# Patient Record
Sex: FEMALE | Race: WHITE | NOT HISPANIC OR LATINO | Employment: OTHER | URBAN - METROPOLITAN AREA
[De-identification: names, ages, dates, MRNs, and addresses within clinical notes are randomized per-mention and may not be internally consistent; named-entity substitution may affect disease eponyms.]

---

## 2018-12-29 LAB
INFLUENZA A ANTIGEN, POC: NEGATIVE
INFLUENZA B ANTIGEN, POC: NEGATIVE
RAPID GROUP A STREP (OHS): NEGATIVE

## 2020-01-24 LAB
INFLUENZA A ANTIGEN, POC: NEGATIVE
INFLUENZA B ANTIGEN, POC: NEGATIVE

## 2020-10-15 PROBLEM — Z95.0 PACEMAKER: Status: ACTIVE | Noted: 2020-10-15

## 2020-12-02 ENCOUNTER — CLINICAL SUPPORT (OUTPATIENT)
Dept: REHABILITATION | Facility: HOSPITAL | Age: 85
End: 2020-12-02
Payer: MEDICARE

## 2020-12-02 DIAGNOSIS — R26.9 GAIT ABNORMALITY: ICD-10-CM

## 2020-12-02 PROCEDURE — 97161 PT EVAL LOW COMPLEX 20 MIN: CPT | Mod: PN

## 2020-12-02 NOTE — PLAN OF CARE
[facility]  Physical Therapy Initial Evaluation    Name: Vee Cadena  Clinic Number: [id number]    Therapy Diagnosis:   Encounter Diagnosis   Name Primary?    Gait abnormality      Physician: Jaren Narvaez II, MD    Physician Orders: PT Eval and Treat   Medical Diagnosis from Referral:   M54.5 (ICD-10-CM) - Acute midline low back pain without sciatica   S22.000A (ICD-10-CM) - Compression fracture of body of thoracic vertebra     Evaluation Date: 12/2/2020  Authorization Period Expiration: 11/25/2021  Plan of Care Expiration: 01/22/2021  Visit # / Visits authorized: 1/ 1     Time In: 1008  Time Out: 1050  Total Billable Time: 42 minutes    Precautions: falls, pacemaker (no estim), osteoporosis, HTN    Subjective   Date of onset: 2-3 weeks ago  History of current condition - Vee reports: onset of low/mid back pain a few weeks ago after twisting to catch a drawer from falling. She reports a history of back pain, however the pain she is now experiencing is a different type and more intense than previous episodes. She has a history of falls.     Medical History:   Past Medical History:   Diagnosis Date    Back ache     Back pain     Carotid bruit     Cervical disc disease     Chest pain     Coronary artery disease     Diffuse cystic mastopathy     Elevated blood pressure     Encounter for blood transfusion     Foot pain, left     Generalized osteoarthrosis     Herpes zoster without complications     Hypertension     Neck pain     Neuropathy     Osteoporosis     Palpitations     PONV (postoperative nausea and vomiting)     TIA (transient ischemic attack)        Surgical History:   Vee Cadena  has a past surgical history that includes Cardiac catheterization; Total abdominal hysterectomy; Tubal ligation; Cholecystectomy; bladder and colon lift and retached; Appendectomy; Cataract extraction (02/23/2009); and Insertion of pacemaker (12/2019).    Past Surgical  History:   Procedure Laterality Date    APPENDECTOMY      bladder and colon lift and retached      CARDIAC CATHETERIZATION      CATARACT EXTRACTION  02/23/2009    CHOLECYSTECTOMY      INSERTION OF PACEMAKER  12/2019    pt reported    TOTAL ABDOMINAL HYSTERECTOMY      TUBAL LIGATION         Medications:   Vee has a current medication list which includes the following prescription(s): amlodipine, ascorbic acid (vitamin c), cholecalciferol (vitamin d3), cyanocobalamin, losartan, tramadol, UNABLE TO FIND, and vitamin e.    Allergies:   Review of patient's allergies indicates:   Allergen Reactions    Demerol [meperidine]      restless        Thoracolumbar Xray 11/24/2020:  Impression:     Multiple compression fractures in the mid and lower thoracic spine.  The acuity of these is uncertain.    Prior Therapy: remote outpatient PT for [facility]  Social History/Occupation: recently  and just moved from [place]; lives alone in ground level apartment; drives; hobbies include reading  Prior Level of Function: Independent  Current Level of Function: Decreased functional mobility     Pain:    Location: low and mid back   Description: shooting, spasming  Aggravating Factors: bending/leaning, prolonged standing (such as preparing a meal)  Easing Factors: sitting down, heat  Current  5/10, worst 9-10/10, best 0/10 (when first waking up and prior to moving)    Pts goals: Improve pain/function    Objective     Posture/Appearance: Fwd head position, rounded shoulders; fwd trunk lean  Palpation: no points of tenderness notable    ROM/Strength:     Trunk flexion: proximal tibia (moving very slowly and reporting increased pain)  Extension 10* with relief of pain  Sidebending R 75%, L 50%    Lower Extremity Range of Motion:  Right Lower Extremity: not fully tested due to pt intolerance to lying down on mat  Left Lower Extremity: not fully tested due to pt intolerance to lying down on mat    Lower Extremity  Strength:  Right Lower Extremity: 4/5 to 4+/5  Left Lower Extremity: 4/5 to 4+/5    Flexibility: ROM as per above  Transfers: sit to stand with bilateral UE support, very slow and guarded  Gait: antalgic with no AD    Functional Outcome Measure: The Revised Oswestry Low Back Pain Questionnaire: 27/50=54% impairment      TREATMENT       Home Exercises and Patient Education Provided    Education provided:   - Role/goal PT; POC  - Discussed monitoring symptoms and stopping activities which cause increased pain    Written Home Exercises Provided: No, will issue at 1st or 2nd treatment after ascertaining pt tolerance    Assessment   Vee is a 87 y.o. female referred to outpatient Physical Therapy with a medical diagnosis of   M54.5 (ICD-10-CM) - Acute midline low back pain without sciatica   S22.000A (ICD-10-CM) - Compression fracture of body of thoracic vertebra     . Pt presents with decreased ROM, impaired posture, gait abnormality, and impaired functional mobility.    Pt prognosis is Good.   Pt will benefit from skilled outpatient Physical Therapy to address the deficits stated above and in the chart below, provide pt/family education, and to maximize pt's level of independence.     Plan of care discussed with patient: Yes  Pt's spiritual, cultural and educational needs considered and patient is agreeable to the plan of care and goals as stated below:     Anticipated Barriers for therapy: pain    Medical Necessity is demonstrated by the following  History  Co-morbidities and personal factors that may impact the plan of care Co-morbidities:   cardiac/pacemaker, HTN, osteoporosis  Personal Factors:   age     moderate   Examination  Body Structures and Functions, activity limitations and participation restrictions that may impact the plan of care Body Regions:   back  Body Systems:    ROM  strength  gait  transfers  Participation Restrictions:   Activity limitations:   Learning and applying knowledge  no  deficits  General Tasks and Commands  no deficits  Communication  no deficits  Mobility  lifting and carrying objects  walking  Self care  washing oneself (bathing, drying, washing hands)  dressing  Domestic Life  cooking  doing house work (cleaning house, washing dishes, laundry)  Interactions/Relationships  no deficits  Life Areas  no deficits  Community and Social Life  recreation and leisure       moderate   Clinical Presentation stable and uncomplicated low   Decision Making/ Complexity Score: low     Goals:  Short Term Goals: 3 weeks   1) Patient will initiate HEP  2) Patient will be able to perform sit to  <5 sec   3) Patient will report pain <5/10    Long Term Goals: 6 weeks   1) Patient will be independent in HEP  2) Patient will return to I/ADL's with pain <3/10  3) Patient will improve functional outcome measure The Revised Oswestry Low Back Pain Questionnaire to <30% impairment    Plan   Plan of care Certification: 12/2/2020 to 01/22/2021.    Outpatient Physical Therapy 2 times weekly for 6 weeks to include the following interventions: Gait Training, Manual Therapy, Moist Heat/ Ice, Patient Education, Therapeutic Activites and Therapeutic Exercise.     Laurie Patel, PT

## 2020-12-04 ENCOUNTER — CLINICAL SUPPORT (OUTPATIENT)
Dept: REHABILITATION | Facility: HOSPITAL | Age: 85
End: 2020-12-04
Payer: MEDICARE

## 2020-12-04 DIAGNOSIS — R26.9 GAIT ABNORMALITY: ICD-10-CM

## 2020-12-04 PROCEDURE — 97110 THERAPEUTIC EXERCISES: CPT | Mod: PN

## 2020-12-04 PROCEDURE — 97010 HOT OR COLD PACKS THERAPY: CPT | Mod: PN

## 2020-12-04 NOTE — PROGRESS NOTES
Physical Therapy Daily Treatment Note     Time In: 1005  Time Out: 1050  Total Billable Time: 45 minutes    Name: Vee Cadena  Clinic Number: [id number]    Therapy Diagnosis:   Encounter Diagnosis   Name Primary?    Gait abnormality      Physician: Jaren Narvaez II, MD    Visit Date: 12/4/2020    Physician Orders: PT Eval and Treat   Medical Diagnosis from Referral:   M54.5 (ICD-10-CM) - Acute midline low back pain without sciatica   S22.000A (ICD-10-CM) - Compression fracture of body of thoracic vertebra      Evaluation Date: 12/2/2020  Authorization Period Expiration: 12/31/2020  Plan of Care Expiration: 01/22/2021  Visit # / Visits authorized: 1/ 19          Precautions: falls, pacemaker (no estim), osteoporosis, HTN    Subjective     Pt reports: no soreness following her initial evaluation  No HEP issued at initial evaluation  Response to previous treatment: N/A  Functional change: no    Pain: 9/10  Location: lower thoracic/lumbar region    Objective     Vee received therapeutic exercises to develop strength, endurance, ROM, core strength, and flexibility for 35 minutes including:    Nustep L1 x 5 min UE's/LE's  Seated hamstring stretch c/ stool 3/30 sec L/R  Seated TrA sets c/ physioball x 20 c/ 5 sec holds  Seated thoracic extensions c/ roll x 15  Trunk flexion c/ physioball fwd x 5    MHP applied to thoracic/lumbar back x 10 min for muscle relaxation purposes        Education provided:     - Discussed the weight of her purse and recommended lightening it if possible to improve posture and balance. She    verbalized understanding/agreement  - Initiate home exercise program as instructed  - Discussed monitoring symptoms and stopping activities which cause increased pain  - Confirmed date/time of next appointment    Written Home Exercises Provided: yes.  Exercises were reviewed and Vee was able to demonstrate them prior to the end of the session.  Vee demonstrated good  understanding of the  education provided.     See EMR under Media for exercises provided 12/4/2020.    Assessment     Performed seated exercises only today due to patient intolerance for getting on mat at initial evaluation. Patient completing therex slowly with cues for correct execution.  Pt reporting pain ~4/10 following therex and relief following MHP. Ambulating with mild unsteadiness without AD.  Vee is progressing well towards her goals.   Pt prognosis is Good.     Pt will continue to benefit from skilled outpatient physical therapy to address the deficits listed in the problem list box on initial evaluation, provide pt/family education and to maximize pt's level of independence in the home and community environment.     Pt's spiritual, cultural and educational needs considered and pt agreeable to plan of care and goals.     Anticipated barriers to physical therapy: pain    Goals:  Short Term Goals: 3 weeks   1) Patient will initiate HEP  2) Patient will be able to perform sit to  <5 sec   3) Patient will report pain <5/10     Long Term Goals: 6 weeks   1) Patient will be independent in HEP  2) Patient will return to I/ADL's with pain <3/10  3) Patient will improve functional outcome measure The Revised Oswestry Low Back Pain Questionnaire to <30% impairment    Short Term Goal Status:  1) Met  2) Not met  3) Not met    Long Term Goal Status:  1) Ongoing  2) Not met  3) Not assessed        Plan     Continue with established Plan of Care towards PT goals. Progression of therex program to include supine activities per pt tolerance.

## 2020-12-08 ENCOUNTER — CLINICAL SUPPORT (OUTPATIENT)
Dept: REHABILITATION | Facility: HOSPITAL | Age: 85
End: 2020-12-08
Payer: MEDICARE

## 2020-12-08 DIAGNOSIS — R26.9 GAIT ABNORMALITY: ICD-10-CM

## 2020-12-08 PROCEDURE — 97110 THERAPEUTIC EXERCISES: CPT | Mod: PN,CQ

## 2020-12-08 PROCEDURE — 97010 HOT OR COLD PACKS THERAPY: CPT | Mod: PN,CQ

## 2020-12-08 NOTE — PROGRESS NOTES
"  Physical Therapy Daily Treatment Note     Name: Vee Cadena  Clinic Number: [id number]    Therapy Diagnosis:   Encounter Diagnosis   Name Primary?    Gait abnormality      Physician: Jaren Narvaez II, MD    Visit Date: 12/8/2020    Physician Orders: PT Eval and Treat   Medical Diagnosis from Referral:   M54.5 (ICD-10-CM) - Acute midline low back pain without sciatica   S22.000A (ICD-10-CM) - Compression fracture of body of thoracic vertebra      Evaluation Date: 12/2/2020  Authorization Period Expiration: 12/31/2020  Plan of Care Expiration: 01/22/2021  Visit # / Visits authorized: 3/19        Time In: 0946  Time Out: 1040  Total Billable Time: 44 minutes    Precautions: falls, pacemaker (no estim), osteoporosis, HTN    Subjective     Pt reports: "I was yimi sore the next day"  Patient was complaint with performing home exercise program   Response to previous treatment: N/A  Functional change: no    Pain: 8/10  Location: lower thoracic/lumbar region    Objective     Vee received therapeutic exercises to develop strength, endurance, ROM, core strength, and flexibility for 34 minutes including:   Nustep L1 x 10 min UE's/LE's   Seated hamstring stretch c/ stool (and towel behind back to assist 3/30 sec L/R   Seated TrA sets c/ physioball x 20 c/ 5 sec holds   Seated thoracic extensions c/ roll x 15   Trunk flexion c/ physioball fwd x 5   Sit<>supine t/f via log roll with CGA and VC's   Supine lying x 3'    MHP applied to thoracic/lumbar back x 10 min for muscle relaxation purposes    Education provided:     - Discussed the weight of her purse and recommended lightening it if possible to improve posture and balance. She    verbalized understanding/agreement  - Initiate home exercise program as instructed  - Discussed monitoring symptoms and stopping activities which cause increased pain  - Confirmed date/time of next appointment    Written Home Exercises Provided: yes. Patient given copy of sleeping positions " as well as log roll technique  Exercises were reviewed and Vee was able to demonstrate them prior to the end of the session.  Vee demonstrated good  understanding of the education provided.     See EMR under Media for exercises provided 12/4/2020.    Assessment   Vee tolerated therex with no c/o increase in s/s. However, with sit<>supine patient slow and guarded and upon returning to sitting position patient c/o shooting pains in mid back.    Vee is progressing well towards her goals.   Pt prognosis is Good.     Pt will continue to benefit from skilled outpatient physical therapy to address the deficits listed in the problem list box on initial evaluation, provide pt/family education and to maximize pt's level of independence in the home and community environment.     Pt's spiritual, cultural and educational needs considered and pt agreeable to plan of care and goals.     Anticipated barriers to physical therapy: pain    Goals:  Short Term Goals: 3 weeks   1) Patient will initiate HEP (progressing)  2) Patient will be able to perform sit to  <5 sec  (progressing)  3) Patient will report pain <5/10 (progressing)     Long Term Goals: 6 weeks   1) Patient will be independent in HEP (progressing)  2) Patient will return to I/ADL's with pain <3/10 (progressing)  3) Patient will improve functional outcome measure The Revised Oswestry Low Back Pain Questionnaire to <30% impairment (not assessed this date)      Plan     Continue with established Plan of Care towards PT goals. Progression of therex program to include supine activities per pt tolerance.

## 2020-12-10 ENCOUNTER — TELEPHONE (OUTPATIENT)
Dept: REHABILITATION | Facility: HOSPITAL | Age: 85
End: 2020-12-10

## 2020-12-11 ENCOUNTER — CLINICAL SUPPORT (OUTPATIENT)
Dept: REHABILITATION | Facility: HOSPITAL | Age: 85
End: 2020-12-11
Attending: INTERNAL MEDICINE
Payer: MEDICARE

## 2020-12-11 DIAGNOSIS — R26.9 GAIT ABNORMALITY: ICD-10-CM

## 2020-12-11 PROCEDURE — 97110 THERAPEUTIC EXERCISES: CPT | Mod: PN,CQ

## 2020-12-11 NOTE — PROGRESS NOTES
"  Physical Therapy Daily Treatment Note     Name: Vee Cadena  Clinic Number: [id number]    Therapy Diagnosis:   Encounter Diagnosis   Name Primary?    Gait abnormality      Physician: Jaren Narvaez II, MD    Visit Date: 12/11/2020    Physician Orders: PT Eval and Treat   Medical Diagnosis from Referral:   M54.5 (ICD-10-CM) - Acute midline low back pain without sciatica   S22.000A (ICD-10-CM) - Compression fracture of body of thoracic vertebra      Evaluation Date: 12/2/2020  Authorization Period Expiration: 12/31/2020  Plan of Care Certification Period: 12/2/2020 to 01/22/2021  Visit # / Visits authorized: 3/19        Time In:  1230  Time Out: 1315  Total Billable Time: 45 minutes    Precautions: falls, pacemaker (no estim), osteoporosis, HTN    Subjective     Pt reports: "Actually, I feel very good today."  Pt states her pain level is decreased today  Patient was complaint with performing home exercise program   Response to previous treatment: N/A  Functional change: no    Pain: 2/10  Location: lower thoracic/lumbar region    Objective     Vee received therapeutic exercises to develop strength, endurance, ROM, core strength, and flexibility for 45 minutes including:    Nustep L2 x 10 min UE's/LE's  Seated hamstring stretch 3 x 20 sec B LE  Seated ball squeezes x 20 reps  Seated TrA sets c/ physioball x 20 c/ 5 sec holds  Seated thoracic extensions c/ roll x 15  Seated trunk flexion c/ physioball fwd x 5    Wedge used for supine exercises  Supine LTR x 10 reps   Supine bridging x 10 reps   PPT x 10 reps   Glut sets x 10 reps    Education provided:   -apply heat at home to decrease muscle soreness and relaxation    Written Home Exercises Provided: yes.   Exercises were reviewed and Vee was able to demonstrate them prior to the end of the session.  Vee demonstrated good  understanding of the education provided.     See EMR under Media for exercises provided 12/4/2020.    Assessment     Vee tolerated " therex with no c/o increase in s/s.  Increased time needed for bed mobility and transfers, especially sit <> supine.    Vee is progressing well towards her goals.   Pt prognosis is Good.     Pt will continue to benefit from skilled outpatient physical therapy to address the deficits listed in the problem list box on initial evaluation, provide pt/family education and to maximize pt's level of independence in the home and community environment.     Pt's spiritual, cultural and educational needs considered and pt agreeable to plan of care and goals.     Anticipated barriers to physical therapy: pain    Goals:  Short Term Goals: 3 weeks   1) Patient will initiate HEP (progressing)  2) Patient will be able to perform sit to  <5 sec  (progressing)  3) Patient will report pain <5/10 (progressing)     Long Term Goals: 6 weeks   1) Patient will be independent in HEP (progressing)  2) Patient will return to I/ADL's with pain <3/10 (progressing)  3) Patient will improve functional outcome measure The Revised Oswestry Low Back Pain Questionnaire to <30% impairment (not assessed this date)      Plan     Continue with established Plan of Care towards PT goals. Progression of therex program to include supine activities per pt tolerance.

## 2020-12-15 ENCOUNTER — CLINICAL SUPPORT (OUTPATIENT)
Dept: REHABILITATION | Facility: HOSPITAL | Age: 85
End: 2020-12-15
Payer: MEDICARE

## 2020-12-15 DIAGNOSIS — R26.9 GAIT ABNORMALITY: ICD-10-CM

## 2020-12-15 PROCEDURE — 97110 THERAPEUTIC EXERCISES: CPT | Mod: PN

## 2020-12-15 NOTE — PROGRESS NOTES
Physical Therapy Daily Treatment Note     Time In: 1025  Time Out: 1109  Total Billable Time: 44 minutes    Name: Vee Cadena  Clinic Number: [id number]    Therapy Diagnosis:   Encounter Diagnosis   Name Primary?    Gait abnormality      Physician: Jaren Narvaez II, MD    Visit Date: 12/15/2020    Physician Orders: PT Eval and Treat   Medical Diagnosis from Referral:   M54.5 (ICD-10-CM) - Acute midline low back pain without sciatica   S22.000A (ICD-10-CM) - Compression fracture of body of thoracic vertebra      Evaluation Date: 12/2/2020  Authorization Period Expiration: 12/31/2020  Plan of Care Certification Period: 12/2/2020 to 01/22/2021  Visit # / Visits authorized: 4/19       Precautions: falls, pacemaker (no estim), osteoporosis, HTN    Subjective     Pt reports: improved overall pain level today. However, she reports severe pain when doing glute sets in therapy, which does not resolve for some time.  She was compliant with home exercise program.  Response to previous treatment: no complaints  Functional change: no    Pain: 2/10  Location: back    Objective     Vee received therapeutic exercises to develop strength, endurance, ROM, flexibility and core stabilization for 44 minutes including:    Nustep L2 x 10 min UE's/LE's  Seated hamstring stretch 3/30 sec L/R  Seated thoracic extensions with foam x 20  Seated TrA sets with physioball x 20 c/ 5 sec holds  Seated ball squeezes x 20 c/ 5 sec holds  Seated fwd flexion with large physioball x 5    Supine mat activities (using wedge for comfort):  PPT x 10  Bridging x 10  LTR's x 10      Education provided:     - Continue with current home exercise program as instructed  - Discussed monitoring symptoms and stopping activities which cause increased pain      Written Home Exercises Provided: yes.  Exercises were reviewed and Vee was able to demonstrate them prior to the end of the session.  Vee demonstrated good  understanding of the education provided.      See EMR under Media for exercises provided 12/4/2020 and 12/15/2020.    Assessment     Good tolerance for activities with cues for proper execution. Educated patient to discontinue glute sets at this time.  Vee is progressing well towards her goals.   Pt prognosis is Good.     Pt will continue to benefit from skilled outpatient physical therapy to address the deficits listed in the problem list box on initial evaluation, provide pt/family education and to maximize pt's level of independence in the home and community environment.     Pt's spiritual, cultural and educational needs considered and pt agreeable to plan of care and goals.     Anticipated barriers to physical therapy: pain    Goals:  Short Term Goals: 3 weeks   1) Patient will initiate HEP  2) Patient will be able to perform sit to  <5 sec   3) Patient will report pain <5/10     Long Term Goals: 6 weeks   1) Patient will be independent in HEP  2) Patient will return to I/ADL's with pain <3/10  3) Patient will improve functional outcome measure The Revised Oswestry Low Back Pain Questionnaire to <30% impairment     Short Term Goal Status:  1) Met  2) Not met  3) Not met     Long Term Goal Status:  1) Ongoing  2) Not met  3) Not assessed           Plan      Continue with established Plan of Care towards PT goals. Progression of therex program to include supine activities per pt tolerance

## 2020-12-21 ENCOUNTER — PATIENT MESSAGE (OUTPATIENT)
Dept: OTHER | Facility: OTHER | Age: 85
End: 2020-12-21

## 2020-12-28 ENCOUNTER — CLINICAL SUPPORT (OUTPATIENT)
Dept: REHABILITATION | Facility: HOSPITAL | Age: 85
End: 2020-12-28
Payer: MEDICARE

## 2020-12-28 DIAGNOSIS — R26.9 GAIT ABNORMALITY: ICD-10-CM

## 2020-12-28 PROCEDURE — 97110 THERAPEUTIC EXERCISES: CPT | Mod: PN

## 2020-12-28 NOTE — PROGRESS NOTES
Physical Therapy Daily Treatment Note     Time In: 1003  Time Out: 1042  Total Billable Time: 39 minutes    Name: Vee Cadena  Clinic Number: [id number]    Therapy Diagnosis:   Encounter Diagnosis   Name Primary?    Gait abnormality      Physician: Jaren Narvaez II, MD    Visit Date: 12/28/2020    Physician Orders: PT Eval and Treat   Medical Diagnosis from Referral:   M54.5 (ICD-10-CM) - Acute midline low back pain without sciatica   S22.000A (ICD-10-CM) - Compression fracture of body of thoracic vertebra      Evaluation Date: 12/2/2020  Authorization Period Expiration: 12/31/2020  Plan of Care Certification Period: 12/2/2020 to 01/22/2021  Visit # / Visits authorized: 5/19         Precautions: falls, pacemaker (no estim), osteoporosis, HTN      Subjective     Pt reports: she hurts after doing her home exercises. Overall, she is still feeling better now than at [facility], no longer having her most intense, sharp pain when getting up to standing, getting in/out of bed. Reports following PT sessions, she is in increased pain and goes home to lie down and put up her feet. Pain resolves after this.  She was compliant with home exercise program.  Response to previous treatment: increased pain   Functional change: no    Pain: 4/10  Location: back    Objective     Vee received therapeutic exercises to develop strength, endurance, ROM, flexibility and core stabilization for 39 minutes including:    Nustep L2 x 10 min UE's/LE's  Seated hamstring stretch c/ stool 3/30 sec L/R  Seated thoracic extensions with foam x 15  Seated TrA sets with physioball x 15 c/ 5 sec holds     Supine mat activities (using wedge for comfort):  PPT x 10  Bridging x 10  LTR's x 10  Hip adduction ball squeezes x 15    Education provided:     - Continue with current home exercise program as instructed  - Discussed monitoring symptoms and stopping activities which cause increased pain  - Confirmed date/time of next appointment    Written Home  Exercises Provided: yes.  Exercises were reviewed and Vee was able to demonstrate them prior to the end of the session.  Vee demonstrated good  understanding of the education provided.      See EMR under Media for exercises provided 12/4/2020 and 12/15/2020    Assessment       Vee is progressing well towards her goals.   Pt prognosis is Fair.     Pt will continue to benefit from skilled outpatient physical therapy to address the deficits listed in the problem list box on initial evaluation, provide pt/family education and to maximize pt's level of independence in the home and community environment.     Pt's spiritual, cultural and educational needs considered and pt agreeable to plan of care and goals.     Anticipated barriers to physical therapy: pain    Goals:  Short Term Goals: 3 weeks   1) Patient will initiate HEP  2) Patient will be able to perform sit to  <5 sec   3) Patient will report pain <5/10     Long Term Goals: 6 weeks   1) Patient will be independent in HEP  2) Patient will return to I/ADL's with pain <3/10  3) Patient will improve functional outcome measure The Revised Oswestry Low Back Pain Questionnaire to <30% impairment     Short Term Goal Status:  1) Met  2) Not met  3) Not met     Long Term Goal Status:  1) Ongoing  2) Not met  3) Not assessed           Plan      Continue with established Plan of Care towards PT goals. Progression of therex program to include supine activities per pt tolerance    Laurie Patel, PT

## 2021-01-05 ENCOUNTER — CLINICAL SUPPORT (OUTPATIENT)
Dept: REHABILITATION | Facility: HOSPITAL | Age: 86
End: 2021-01-05
Payer: MEDICARE

## 2021-01-05 DIAGNOSIS — R26.9 GAIT ABNORMALITY: Primary | ICD-10-CM

## 2021-01-05 PROCEDURE — 97110 THERAPEUTIC EXERCISES: CPT | Mod: PN,CQ

## 2021-01-08 ENCOUNTER — CLINICAL SUPPORT (OUTPATIENT)
Dept: REHABILITATION | Facility: HOSPITAL | Age: 86
End: 2021-01-08
Payer: MEDICARE

## 2021-01-08 DIAGNOSIS — R26.9 GAIT ABNORMALITY: ICD-10-CM

## 2021-02-08 ENCOUNTER — CLINICAL SUPPORT (OUTPATIENT)
Dept: REHABILITATION | Facility: HOSPITAL | Age: 86
End: 2021-02-08
Payer: MEDICARE

## 2021-02-08 DIAGNOSIS — R26.9 GAIT ABNORMALITY: ICD-10-CM

## 2021-02-08 PROCEDURE — 97161 PT EVAL LOW COMPLEX 20 MIN: CPT | Mod: PN

## 2021-03-01 ENCOUNTER — CLINICAL SUPPORT (OUTPATIENT)
Dept: REHABILITATION | Facility: HOSPITAL | Age: 86
End: 2021-03-01
Payer: MEDICARE

## 2021-03-01 DIAGNOSIS — R26.9 GAIT ABNORMALITY: ICD-10-CM

## 2021-03-01 PROCEDURE — 97110 THERAPEUTIC EXERCISES: CPT | Mod: PN,CQ

## 2021-03-01 PROCEDURE — 97112 NEUROMUSCULAR REEDUCATION: CPT | Mod: PN,CQ

## 2021-03-05 ENCOUNTER — CLINICAL SUPPORT (OUTPATIENT)
Dept: REHABILITATION | Facility: HOSPITAL | Age: 86
End: 2021-03-05
Payer: MEDICARE

## 2021-03-05 DIAGNOSIS — R26.9 GAIT ABNORMALITY: ICD-10-CM

## 2021-03-05 PROCEDURE — 97110 THERAPEUTIC EXERCISES: CPT | Mod: PN,CQ

## 2021-03-05 PROCEDURE — 97112 NEUROMUSCULAR REEDUCATION: CPT | Mod: PN,CQ

## 2021-03-08 ENCOUNTER — CLINICAL SUPPORT (OUTPATIENT)
Dept: REHABILITATION | Facility: HOSPITAL | Age: 86
End: 2021-03-08
Payer: MEDICARE

## 2021-03-08 DIAGNOSIS — R26.9 GAIT ABNORMALITY: ICD-10-CM

## 2021-03-08 PROCEDURE — 97112 NEUROMUSCULAR REEDUCATION: CPT | Mod: PN,CQ

## 2021-03-08 PROCEDURE — 97110 THERAPEUTIC EXERCISES: CPT | Mod: PN,CQ

## 2021-03-27 ENCOUNTER — IMMUNIZATION (OUTPATIENT)
Dept: PRIMARY CARE CLINIC | Facility: CLINIC | Age: 86
End: 2021-03-27

## 2021-03-27 DIAGNOSIS — Z23 NEED FOR VACCINATION: Primary | ICD-10-CM

## 2021-03-27 PROCEDURE — 0001A COVID-19, MRNA, LNP-S, PF, 30 MCG/0.3 ML DOSE VACCINE: CPT | Mod: CV19,S$GLB,, | Performed by: FAMILY MEDICINE

## 2021-03-27 PROCEDURE — 91300 COVID-19, MRNA, LNP-S, PF, 30 MCG/0.3 ML DOSE VACCINE: ICD-10-PCS | Mod: S$GLB,,, | Performed by: FAMILY MEDICINE

## 2021-03-27 PROCEDURE — 0001A COVID-19, MRNA, LNP-S, PF, 30 MCG/0.3 ML DOSE VACCINE: ICD-10-PCS | Mod: CV19,S$GLB,, | Performed by: FAMILY MEDICINE

## 2021-03-27 PROCEDURE — 91300 COVID-19, MRNA, LNP-S, PF, 30 MCG/0.3 ML DOSE VACCINE: CPT | Mod: S$GLB,,, | Performed by: FAMILY MEDICINE

## 2021-04-17 ENCOUNTER — IMMUNIZATION (OUTPATIENT)
Dept: PRIMARY CARE CLINIC | Facility: CLINIC | Age: 86
End: 2021-04-17
Payer: MEDICARE

## 2021-04-17 DIAGNOSIS — Z23 NEED FOR VACCINATION: Primary | ICD-10-CM

## 2021-04-17 PROCEDURE — 91300 COVID-19, MRNA, LNP-S, PF, 30 MCG/0.3 ML DOSE VACCINE: ICD-10-PCS | Mod: S$GLB,,, | Performed by: FAMILY MEDICINE

## 2021-04-17 PROCEDURE — 91300 COVID-19, MRNA, LNP-S, PF, 30 MCG/0.3 ML DOSE VACCINE: CPT | Mod: S$GLB,,, | Performed by: FAMILY MEDICINE

## 2021-04-17 PROCEDURE — 0002A COVID-19, MRNA, LNP-S, PF, 30 MCG/0.3 ML DOSE VACCINE: CPT | Mod: CV19,S$GLB,, | Performed by: FAMILY MEDICINE

## 2021-04-17 PROCEDURE — 0002A COVID-19, MRNA, LNP-S, PF, 30 MCG/0.3 ML DOSE VACCINE: ICD-10-PCS | Mod: CV19,S$GLB,, | Performed by: FAMILY MEDICINE

## 2021-08-17 ENCOUNTER — OFFICE VISIT (OUTPATIENT)
Dept: SPINE | Facility: CLINIC | Age: 86
End: 2021-08-17
Payer: MEDICARE

## 2021-08-17 VITALS — WEIGHT: 142 LBS | BODY MASS INDEX: 24.24 KG/M2 | HEIGHT: 64 IN

## 2021-08-17 DIAGNOSIS — M40.204 KYPHOSIS OF THORACIC REGION, UNSPECIFIED KYPHOSIS TYPE: Primary | ICD-10-CM

## 2021-08-17 DIAGNOSIS — G89.29 CHRONIC MIDLINE LOW BACK PAIN WITHOUT SCIATICA: ICD-10-CM

## 2021-08-17 DIAGNOSIS — M54.50 CHRONIC MIDLINE LOW BACK PAIN WITHOUT SCIATICA: ICD-10-CM

## 2021-08-17 PROCEDURE — 99204 PR OFFICE/OUTPT VISIT, NEW, LEVL IV, 45-59 MIN: ICD-10-PCS | Mod: S$GLB,,, | Performed by: PHYSICAL MEDICINE & REHABILITATION

## 2021-08-17 PROCEDURE — 99204 OFFICE O/P NEW MOD 45 MIN: CPT | Mod: S$GLB,,, | Performed by: PHYSICAL MEDICINE & REHABILITATION

## 2021-09-05 ENCOUNTER — HOSPITAL ENCOUNTER (INPATIENT)
Facility: HOSPITAL | Age: 86
LOS: 2 days | Discharge: HOME | DRG: 378 | End: 2021-09-07
Attending: EMERGENCY MEDICINE | Admitting: INTERNAL MEDICINE
Payer: MEDICARE

## 2021-09-05 DIAGNOSIS — K92.2 GI BLEED: Primary | ICD-10-CM

## 2021-09-05 DIAGNOSIS — R94.31 ABNORMAL EKG: ICD-10-CM

## 2021-09-05 PROBLEM — N18.30 ACUTE RENAL FAILURE SUPERIMPOSED ON STAGE 3 CHRONIC KIDNEY DISEASE: Status: ACTIVE | Noted: 2021-09-05

## 2021-09-05 PROBLEM — N17.9 ACUTE RENAL FAILURE SUPERIMPOSED ON STAGE 3 CHRONIC KIDNEY DISEASE: Status: ACTIVE | Noted: 2021-09-05

## 2021-09-05 LAB
ABO + RH BLD: NORMAL
ALBUMIN SERPL BCP-MCNC: 3.7 G/DL (ref 3.5–5.2)
ALP SERPL-CCNC: 51 U/L (ref 55–135)
ALT SERPL W/O P-5'-P-CCNC: 13 U/L (ref 10–44)
ANION GAP SERPL CALC-SCNC: 11 MMOL/L (ref 8–16)
AST SERPL-CCNC: 15 U/L (ref 10–40)
BASOPHILS # BLD AUTO: 0.03 K/UL (ref 0–0.2)
BASOPHILS NFR BLD: 0.5 % (ref 0–1.9)
BILIRUB SERPL-MCNC: 0.7 MG/DL (ref 0.1–1)
BLD GP AB SCN CELLS X3 SERPL QL: NORMAL
BLD PROD TYP BPU: NORMAL
BLOOD UNIT EXPIRATION DATE: NORMAL
BLOOD UNIT TYPE CODE: 600
BLOOD UNIT TYPE: NORMAL
BUN SERPL-MCNC: 31 MG/DL (ref 8–23)
C DIFF GDH STL QL: NEGATIVE
C DIFF TOX A+B STL QL IA: NEGATIVE
CALCIUM SERPL-MCNC: 8.6 MG/DL (ref 8.7–10.5)
CHLORIDE SERPL-SCNC: 100 MMOL/L (ref 95–110)
CK MB SERPL-MCNC: 1 NG/ML (ref 0.1–6.5)
CK SERPL-CCNC: 19 U/L (ref 20–180)
CO2 SERPL-SCNC: 25 MMOL/L (ref 23–29)
CODING SYSTEM: NORMAL
CREAT SERPL-MCNC: 1.5 MG/DL (ref 0.5–1.4)
DIFFERENTIAL METHOD: ABNORMAL
DISPENSE STATUS: NORMAL
EOSINOPHIL # BLD AUTO: 0.2 K/UL (ref 0–0.5)
EOSINOPHIL NFR BLD: 2.9 % (ref 0–8)
ERYTHROCYTE [DISTWIDTH] IN BLOOD BY AUTOMATED COUNT: 13.4 % (ref 11.5–14.5)
EST. GFR  (AFRICAN AMERICAN): 35.9 ML/MIN/1.73 M^2
EST. GFR  (NON AFRICAN AMERICAN): 31.1 ML/MIN/1.73 M^2
GLUCOSE SERPL-MCNC: 113 MG/DL (ref 70–110)
HCT VFR BLD AUTO: 29.9 % (ref 37–48.5)
HCT VFR BLD AUTO: 35.1 % (ref 37–48.5)
HGB BLD-MCNC: 11.5 G/DL (ref 12–16)
HGB BLD-MCNC: 9.8 G/DL (ref 12–16)
IMM GRANULOCYTES # BLD AUTO: 0.02 K/UL (ref 0–0.04)
IMM GRANULOCYTES NFR BLD AUTO: 0.3 % (ref 0–0.5)
INR PPP: 1.1
LIPASE SERPL-CCNC: 32 U/L (ref 4–60)
LYMPHOCYTES # BLD AUTO: 0.8 K/UL (ref 1–4.8)
LYMPHOCYTES NFR BLD: 14.1 % (ref 18–48)
MCH RBC QN AUTO: 29.1 PG (ref 27–31)
MCHC RBC AUTO-ENTMCNC: 32.8 G/DL (ref 32–36)
MCV RBC AUTO: 89 FL (ref 82–98)
MONOCYTES # BLD AUTO: 0.5 K/UL (ref 0.3–1)
MONOCYTES NFR BLD: 8 % (ref 4–15)
NEUTROPHILS # BLD AUTO: 4.4 K/UL (ref 1.8–7.7)
NEUTROPHILS NFR BLD: 74.2 % (ref 38–73)
NRBC BLD-RTO: 0 /100 WBC
NUM UNITS TRANS PACKED RBC: NORMAL
OB PNL STL: POSITIVE
PLATELET # BLD AUTO: 201 K/UL (ref 150–450)
PMV BLD AUTO: 10.3 FL (ref 9.2–12.9)
POTASSIUM SERPL-SCNC: 4.5 MMOL/L (ref 3.5–5.1)
PROT SERPL-MCNC: 6.4 G/DL (ref 6–8.4)
PROTHROMBIN TIME: 13.8 SEC (ref 11.8–14.3)
RBC # BLD AUTO: 3.95 M/UL (ref 4–5.4)
SARS-COV-2 RDRP RESP QL NAA+PROBE: NEGATIVE
SODIUM SERPL-SCNC: 136 MMOL/L (ref 136–145)
TROPONIN I SERPL DL<=0.01 NG/ML-MCNC: <0.03 NG/ML
WBC # BLD AUTO: 5.87 K/UL (ref 3.9–12.7)
WBC #/AREA STL HPF: NORMAL /[HPF]

## 2021-09-05 PROCEDURE — 96361 HYDRATE IV INFUSION ADD-ON: CPT

## 2021-09-05 PROCEDURE — 93010 ELECTROCARDIOGRAM REPORT: CPT | Mod: ,,, | Performed by: INTERNAL MEDICINE

## 2021-09-05 PROCEDURE — 63600175 PHARM REV CODE 636 W HCPCS: Performed by: EMERGENCY MEDICINE

## 2021-09-05 PROCEDURE — 80053 COMPREHEN METABOLIC PANEL: CPT | Performed by: EMERGENCY MEDICINE

## 2021-09-05 PROCEDURE — 85014 HEMATOCRIT: CPT | Performed by: NURSE PRACTITIONER

## 2021-09-05 PROCEDURE — 83690 ASSAY OF LIPASE: CPT | Performed by: EMERGENCY MEDICINE

## 2021-09-05 PROCEDURE — 87045 FECES CULTURE AEROBIC BACT: CPT | Performed by: EMERGENCY MEDICINE

## 2021-09-05 PROCEDURE — 82550 ASSAY OF CK (CPK): CPT | Performed by: NURSE PRACTITIONER

## 2021-09-05 PROCEDURE — 82553 CREATINE MB FRACTION: CPT | Performed by: NURSE PRACTITIONER

## 2021-09-05 PROCEDURE — 86920 COMPATIBILITY TEST SPIN: CPT | Performed by: NURSE PRACTITIONER

## 2021-09-05 PROCEDURE — 36415 COLL VENOUS BLD VENIPUNCTURE: CPT | Performed by: EMERGENCY MEDICINE

## 2021-09-05 PROCEDURE — 96374 THER/PROPH/DIAG INJ IV PUSH: CPT

## 2021-09-05 PROCEDURE — 86900 BLOOD TYPING SEROLOGIC ABO: CPT | Performed by: EMERGENCY MEDICINE

## 2021-09-05 PROCEDURE — 36415 COLL VENOUS BLD VENIPUNCTURE: CPT | Performed by: INTERNAL MEDICINE

## 2021-09-05 PROCEDURE — 30000890 HC MISC. SEND OUT TEST

## 2021-09-05 PROCEDURE — 94761 N-INVAS EAR/PLS OXIMETRY MLT: CPT

## 2021-09-05 PROCEDURE — 36415 COLL VENOUS BLD VENIPUNCTURE: CPT | Performed by: NURSE PRACTITIONER

## 2021-09-05 PROCEDURE — 82272 OCCULT BLD FECES 1-3 TESTS: CPT | Performed by: EMERGENCY MEDICINE

## 2021-09-05 PROCEDURE — C9113 INJ PANTOPRAZOLE SODIUM, VIA: HCPCS | Performed by: EMERGENCY MEDICINE

## 2021-09-05 PROCEDURE — 99900035 HC TECH TIME PER 15 MIN (STAT)

## 2021-09-05 PROCEDURE — 87177 OVA AND PARASITES SMEARS: CPT | Performed by: EMERGENCY MEDICINE

## 2021-09-05 PROCEDURE — 85610 PROTHROMBIN TIME: CPT | Performed by: EMERGENCY MEDICINE

## 2021-09-05 PROCEDURE — 30000890 LABCORP MISCELLANEOUS TEST: Performed by: EMERGENCY MEDICINE

## 2021-09-05 PROCEDURE — 87046 STOOL CULTR AEROBIC BACT EA: CPT | Performed by: EMERGENCY MEDICINE

## 2021-09-05 PROCEDURE — 63600175 PHARM REV CODE 636 W HCPCS: Performed by: INTERNAL MEDICINE

## 2021-09-05 PROCEDURE — 25000003 PHARM REV CODE 250: Performed by: INTERNAL MEDICINE

## 2021-09-05 PROCEDURE — P9016 RBC LEUKOCYTES REDUCED: HCPCS | Performed by: NURSE PRACTITIONER

## 2021-09-05 PROCEDURE — 87209 SMEAR COMPLEX STAIN: CPT

## 2021-09-05 PROCEDURE — 85018 HEMOGLOBIN: CPT | Performed by: NURSE PRACTITIONER

## 2021-09-05 PROCEDURE — 99285 EMERGENCY DEPT VISIT HI MDM: CPT | Mod: 25

## 2021-09-05 PROCEDURE — U0002 COVID-19 LAB TEST NON-CDC: HCPCS | Performed by: EMERGENCY MEDICINE

## 2021-09-05 PROCEDURE — 85025 COMPLETE CBC W/AUTO DIFF WBC: CPT | Performed by: EMERGENCY MEDICINE

## 2021-09-05 PROCEDURE — 93010 EKG 12-LEAD: ICD-10-PCS | Mod: ,,, | Performed by: INTERNAL MEDICINE

## 2021-09-05 PROCEDURE — 87324 CLOSTRIDIUM AG IA: CPT | Performed by: EMERGENCY MEDICINE

## 2021-09-05 PROCEDURE — 89055 LEUKOCYTE ASSESSMENT FECAL: CPT | Performed by: EMERGENCY MEDICINE

## 2021-09-05 PROCEDURE — 25000003 PHARM REV CODE 250: Performed by: NURSE PRACTITIONER

## 2021-09-05 PROCEDURE — 93005 ELECTROCARDIOGRAM TRACING: CPT | Performed by: INTERNAL MEDICINE

## 2021-09-05 PROCEDURE — C9113 INJ PANTOPRAZOLE SODIUM, VIA: HCPCS | Performed by: INTERNAL MEDICINE

## 2021-09-05 PROCEDURE — 25000003 PHARM REV CODE 250: Performed by: EMERGENCY MEDICINE

## 2021-09-05 PROCEDURE — 87449 NOS EACH ORGANISM AG IA: CPT | Performed by: EMERGENCY MEDICINE

## 2021-09-05 PROCEDURE — 21000000 HC CCU ICU ROOM CHARGE

## 2021-09-05 PROCEDURE — 84484 ASSAY OF TROPONIN QUANT: CPT | Performed by: NURSE PRACTITIONER

## 2021-09-05 RX ORDER — AMLODIPINE BESYLATE 5 MG/1
5 TABLET ORAL NIGHTLY
Status: DISCONTINUED | OUTPATIENT
Start: 2021-09-05 | End: 2021-09-07 | Stop reason: HOSPADM

## 2021-09-05 RX ORDER — HYDROMORPHONE HYDROCHLORIDE 1 MG/ML
0.5 INJECTION, SOLUTION INTRAMUSCULAR; INTRAVENOUS; SUBCUTANEOUS EVERY 6 HOURS PRN
Status: DISCONTINUED | OUTPATIENT
Start: 2021-09-05 | End: 2021-09-05

## 2021-09-05 RX ORDER — ASCORBIC ACID 500 MG
1000 TABLET ORAL DAILY
Status: DISCONTINUED | OUTPATIENT
Start: 2021-09-06 | End: 2021-09-07 | Stop reason: HOSPADM

## 2021-09-05 RX ORDER — HYDROCODONE BITARTRATE AND ACETAMINOPHEN 500; 5 MG/1; MG/1
TABLET ORAL
Status: DISCONTINUED | OUTPATIENT
Start: 2021-09-05 | End: 2021-09-07 | Stop reason: HOSPADM

## 2021-09-05 RX ORDER — HYDROMORPHONE HYDROCHLORIDE 1 MG/ML
0.5 INJECTION, SOLUTION INTRAMUSCULAR; INTRAVENOUS; SUBCUTANEOUS EVERY 6 HOURS PRN
Status: DISCONTINUED | OUTPATIENT
Start: 2021-09-05 | End: 2021-09-07 | Stop reason: HOSPADM

## 2021-09-05 RX ORDER — SODIUM CHLORIDE 9 MG/ML
INJECTION, SOLUTION INTRAVENOUS CONTINUOUS
Status: ACTIVE | OUTPATIENT
Start: 2021-09-05 | End: 2021-09-06

## 2021-09-05 RX ORDER — CHOLECALCIFEROL (VITAMIN D3) 50 MCG
1 TABLET ORAL DAILY
Status: DISCONTINUED | OUTPATIENT
Start: 2021-09-06 | End: 2021-09-07 | Stop reason: HOSPADM

## 2021-09-05 RX ORDER — LOSARTAN POTASSIUM 50 MG/1
50 TABLET ORAL NIGHTLY
Status: DISCONTINUED | OUTPATIENT
Start: 2021-09-05 | End: 2021-09-07 | Stop reason: HOSPADM

## 2021-09-05 RX ORDER — ONDANSETRON 2 MG/ML
4 INJECTION INTRAMUSCULAR; INTRAVENOUS EVERY 8 HOURS PRN
Status: DISCONTINUED | OUTPATIENT
Start: 2021-09-05 | End: 2021-09-07 | Stop reason: HOSPADM

## 2021-09-05 RX ORDER — TALC
6 POWDER (GRAM) TOPICAL NIGHTLY PRN
Status: DISCONTINUED | OUTPATIENT
Start: 2021-09-05 | End: 2021-09-07 | Stop reason: HOSPADM

## 2021-09-05 RX ORDER — POLYETHYLENE GLYCOL 3350 17 G/17G
238 POWDER, FOR SOLUTION ORAL ONCE
Status: COMPLETED | OUTPATIENT
Start: 2021-09-05 | End: 2021-09-05

## 2021-09-05 RX ORDER — SODIUM CHLORIDE 0.9 % (FLUSH) 0.9 %
10 SYRINGE (ML) INJECTION
Status: DISCONTINUED | OUTPATIENT
Start: 2021-09-05 | End: 2021-09-07 | Stop reason: HOSPADM

## 2021-09-05 RX ORDER — PANTOPRAZOLE SODIUM 40 MG/10ML
80 INJECTION, POWDER, LYOPHILIZED, FOR SOLUTION INTRAVENOUS
Status: COMPLETED | OUTPATIENT
Start: 2021-09-05 | End: 2021-09-05

## 2021-09-05 RX ORDER — LANOLIN ALCOHOL/MO/W.PET/CERES
1000 CREAM (GRAM) TOPICAL DAILY
Status: DISCONTINUED | OUTPATIENT
Start: 2021-09-06 | End: 2021-09-07 | Stop reason: HOSPADM

## 2021-09-05 RX ADMIN — LOSARTAN POTASSIUM 50 MG: 50 TABLET, FILM COATED ORAL at 08:09

## 2021-09-05 RX ADMIN — SODIUM CHLORIDE 500 ML: 0.9 INJECTION, SOLUTION INTRAVENOUS at 12:09

## 2021-09-05 RX ADMIN — SODIUM CHLORIDE: 9 INJECTION, SOLUTION INTRAVENOUS at 07:09

## 2021-09-05 RX ADMIN — AMLODIPINE BESYLATE 5 MG: 5 TABLET ORAL at 08:09

## 2021-09-05 RX ADMIN — PANTOPRAZOLE SODIUM 80 MG: 40 INJECTION, POWDER, LYOPHILIZED, FOR SOLUTION INTRAVENOUS at 12:09

## 2021-09-05 RX ADMIN — POLYETHYLENE GLYCOL 3350 238 G: 17 POWDER, FOR SOLUTION ORAL at 08:09

## 2021-09-06 ENCOUNTER — ANESTHESIA (OUTPATIENT)
Dept: SURGERY | Facility: HOSPITAL | Age: 86
DRG: 378 | End: 2021-09-06
Payer: MEDICARE

## 2021-09-06 ENCOUNTER — ANESTHESIA EVENT (OUTPATIENT)
Dept: SURGERY | Facility: HOSPITAL | Age: 86
DRG: 378 | End: 2021-09-06
Payer: MEDICARE

## 2021-09-06 LAB
ANION GAP SERPL CALC-SCNC: 8 MMOL/L (ref 8–16)
BASOPHILS # BLD AUTO: 0.02 K/UL (ref 0–0.2)
BASOPHILS NFR BLD: 0.3 % (ref 0–1.9)
BUN SERPL-MCNC: 22 MG/DL (ref 8–23)
CALCIUM SERPL-MCNC: 8 MG/DL (ref 8.7–10.5)
CHLORIDE SERPL-SCNC: 106 MMOL/L (ref 95–110)
CK MB SERPL-MCNC: 1.2 NG/ML (ref 0.1–6.5)
CK MB SERPL-MCNC: 1.7 NG/ML (ref 0.1–6.5)
CK SERPL-CCNC: 23 U/L (ref 20–180)
CK SERPL-CCNC: 48 U/L (ref 20–180)
CO2 SERPL-SCNC: 24 MMOL/L (ref 23–29)
CREAT SERPL-MCNC: 1.1 MG/DL (ref 0.5–1.4)
DIFFERENTIAL METHOD: ABNORMAL
EOSINOPHIL # BLD AUTO: 0.3 K/UL (ref 0–0.5)
EOSINOPHIL NFR BLD: 3.5 % (ref 0–8)
ERYTHROCYTE [DISTWIDTH] IN BLOOD BY AUTOMATED COUNT: 13.6 % (ref 11.5–14.5)
EST. GFR  (AFRICAN AMERICAN): 52.2 ML/MIN/1.73 M^2
EST. GFR  (NON AFRICAN AMERICAN): 45.3 ML/MIN/1.73 M^2
GLUCOSE SERPL-MCNC: 100 MG/DL (ref 70–110)
HCT VFR BLD AUTO: 30 % (ref 37–48.5)
HCT VFR BLD AUTO: 32.3 % (ref 37–48.5)
HGB BLD-MCNC: 10 G/DL (ref 12–16)
HGB BLD-MCNC: 10.7 G/DL (ref 12–16)
IMM GRANULOCYTES # BLD AUTO: 0.02 K/UL (ref 0–0.04)
IMM GRANULOCYTES NFR BLD AUTO: 0.3 % (ref 0–0.5)
LYMPHOCYTES # BLD AUTO: 1.5 K/UL (ref 1–4.8)
LYMPHOCYTES NFR BLD: 20.6 % (ref 18–48)
MAGNESIUM SERPL-MCNC: 1.9 MG/DL (ref 1.6–2.6)
MCH RBC QN AUTO: 29.4 PG (ref 27–31)
MCHC RBC AUTO-ENTMCNC: 33.3 G/DL (ref 32–36)
MCV RBC AUTO: 88 FL (ref 82–98)
MONOCYTES # BLD AUTO: 0.8 K/UL (ref 0.3–1)
MONOCYTES NFR BLD: 10.7 % (ref 4–15)
NEUTROPHILS # BLD AUTO: 4.8 K/UL (ref 1.8–7.7)
NEUTROPHILS NFR BLD: 64.6 % (ref 38–73)
NRBC BLD-RTO: 0 /100 WBC
PLATELET # BLD AUTO: 173 K/UL (ref 150–450)
PMV BLD AUTO: 10.3 FL (ref 9.2–12.9)
POTASSIUM SERPL-SCNC: 4.2 MMOL/L (ref 3.5–5.1)
RBC # BLD AUTO: 3.4 M/UL (ref 4–5.4)
SODIUM SERPL-SCNC: 138 MMOL/L (ref 136–145)
TROPONIN I SERPL DL<=0.01 NG/ML-MCNC: <0.03 NG/ML
TROPONIN I SERPL DL<=0.01 NG/ML-MCNC: <0.03 NG/ML
WBC # BLD AUTO: 7.46 K/UL (ref 3.9–12.7)

## 2021-09-06 PROCEDURE — 25000003 PHARM REV CODE 250: Performed by: INTERNAL MEDICINE

## 2021-09-06 PROCEDURE — 82550 ASSAY OF CK (CPK): CPT | Performed by: NURSE PRACTITIONER

## 2021-09-06 PROCEDURE — 43239 EGD BIOPSY SINGLE/MULTIPLE: CPT | Performed by: INTERNAL MEDICINE

## 2021-09-06 PROCEDURE — 27200043 HC FORCEPS, BIOPSY: Performed by: INTERNAL MEDICINE

## 2021-09-06 PROCEDURE — 45378 DIAGNOSTIC COLONOSCOPY: CPT | Performed by: INTERNAL MEDICINE

## 2021-09-06 PROCEDURE — 37000008 HC ANESTHESIA 1ST 15 MINUTES: Performed by: INTERNAL MEDICINE

## 2021-09-06 PROCEDURE — 21000000 HC CCU ICU ROOM CHARGE

## 2021-09-06 PROCEDURE — 63600175 PHARM REV CODE 636 W HCPCS: Performed by: INTERNAL MEDICINE

## 2021-09-06 PROCEDURE — 82553 CREATINE MB FRACTION: CPT | Mod: 91 | Performed by: NURSE PRACTITIONER

## 2021-09-06 PROCEDURE — 85014 HEMATOCRIT: CPT | Performed by: NURSE PRACTITIONER

## 2021-09-06 PROCEDURE — 27000671 HC TUBING MICROBORE EXT: Performed by: STUDENT IN AN ORGANIZED HEALTH CARE EDUCATION/TRAINING PROGRAM

## 2021-09-06 PROCEDURE — 88305 TISSUE EXAM BY PATHOLOGIST: CPT | Mod: TC

## 2021-09-06 PROCEDURE — 99223 PR INITIAL HOSPITAL CARE,LEVL III: ICD-10-PCS | Mod: ,,, | Performed by: INTERNAL MEDICINE

## 2021-09-06 PROCEDURE — 63600175 PHARM REV CODE 636 W HCPCS: Performed by: STUDENT IN AN ORGANIZED HEALTH CARE EDUCATION/TRAINING PROGRAM

## 2021-09-06 PROCEDURE — C9113 INJ PANTOPRAZOLE SODIUM, VIA: HCPCS | Performed by: INTERNAL MEDICINE

## 2021-09-06 PROCEDURE — 99900035 HC TECH TIME PER 15 MIN (STAT)

## 2021-09-06 PROCEDURE — 99223 1ST HOSP IP/OBS HIGH 75: CPT | Mod: ,,, | Performed by: INTERNAL MEDICINE

## 2021-09-06 PROCEDURE — 25000003 PHARM REV CODE 250: Performed by: NURSE PRACTITIONER

## 2021-09-06 PROCEDURE — 25000003 PHARM REV CODE 250: Performed by: STUDENT IN AN ORGANIZED HEALTH CARE EDUCATION/TRAINING PROGRAM

## 2021-09-06 PROCEDURE — 80048 BASIC METABOLIC PNL TOTAL CA: CPT | Performed by: NURSE PRACTITIONER

## 2021-09-06 PROCEDURE — 37000009 HC ANESTHESIA EA ADD 15 MINS: Performed by: INTERNAL MEDICINE

## 2021-09-06 PROCEDURE — 83735 ASSAY OF MAGNESIUM: CPT | Performed by: NURSE PRACTITIONER

## 2021-09-06 PROCEDURE — 94761 N-INVAS EAR/PLS OXIMETRY MLT: CPT

## 2021-09-06 PROCEDURE — 85018 HEMOGLOBIN: CPT | Performed by: NURSE PRACTITIONER

## 2021-09-06 PROCEDURE — 36415 COLL VENOUS BLD VENIPUNCTURE: CPT | Performed by: NURSE PRACTITIONER

## 2021-09-06 PROCEDURE — 84484 ASSAY OF TROPONIN QUANT: CPT | Performed by: NURSE PRACTITIONER

## 2021-09-06 PROCEDURE — 85025 COMPLETE CBC W/AUTO DIFF WBC: CPT | Performed by: NURSE PRACTITIONER

## 2021-09-06 RX ORDER — PROPOFOL 10 MG/ML
VIAL (ML) INTRAVENOUS
Status: DISCONTINUED | OUTPATIENT
Start: 2021-09-06 | End: 2021-09-06

## 2021-09-06 RX ORDER — SODIUM CHLORIDE 9 MG/ML
INJECTION, SOLUTION INTRAVENOUS CONTINUOUS PRN
Status: DISCONTINUED | OUTPATIENT
Start: 2021-09-06 | End: 2021-09-06

## 2021-09-06 RX ADMIN — PROPOFOL 20 MG: 10 INJECTION, EMULSION INTRAVENOUS at 12:09

## 2021-09-06 RX ADMIN — SODIUM CHLORIDE: 9 INJECTION, SOLUTION INTRAVENOUS at 12:09

## 2021-09-06 RX ADMIN — AMLODIPINE BESYLATE 5 MG: 5 TABLET ORAL at 08:09

## 2021-09-06 RX ADMIN — SODIUM CHLORIDE: 9 INJECTION, SOLUTION INTRAVENOUS at 05:09

## 2021-09-06 RX ADMIN — PROPOFOL 60 MG: 10 INJECTION, EMULSION INTRAVENOUS at 12:09

## 2021-09-06 RX ADMIN — MELATONIN TAB 3 MG 6 MG: 3 TAB at 08:09

## 2021-09-06 RX ADMIN — PROPOFOL 30 MG: 10 INJECTION, EMULSION INTRAVENOUS at 12:09

## 2021-09-06 RX ADMIN — LOSARTAN POTASSIUM 50 MG: 50 TABLET, FILM COATED ORAL at 08:09

## 2021-09-06 RX ADMIN — SODIUM CHLORIDE: 9 INJECTION, SOLUTION INTRAVENOUS at 11:09

## 2021-09-06 RX ADMIN — SODIUM CHLORIDE: 9 INJECTION, SOLUTION INTRAVENOUS at 10:09

## 2021-09-07 ENCOUNTER — PATIENT MESSAGE (OUTPATIENT)
Dept: ORTHOPEDICS | Facility: CLINIC | Age: 86
End: 2021-09-07

## 2021-09-07 ENCOUNTER — CLINICAL SUPPORT (OUTPATIENT)
Dept: CARDIOLOGY | Facility: HOSPITAL | Age: 86
DRG: 378 | End: 2021-09-07
Payer: MEDICARE

## 2021-09-07 VITALS
SYSTOLIC BLOOD PRESSURE: 170 MMHG | WEIGHT: 149.25 LBS | OXYGEN SATURATION: 94 % | DIASTOLIC BLOOD PRESSURE: 82 MMHG | HEIGHT: 64 IN | BODY MASS INDEX: 25.48 KG/M2 | RESPIRATION RATE: 16 BRPM | HEART RATE: 77 BPM | TEMPERATURE: 99 F

## 2021-09-07 PROBLEM — N17.9 ACUTE RENAL FAILURE SUPERIMPOSED ON STAGE 3 CHRONIC KIDNEY DISEASE: Status: RESOLVED | Noted: 2021-09-05 | Resolved: 2021-09-07

## 2021-09-07 PROBLEM — K92.2 GI BLEED: Status: RESOLVED | Noted: 2021-09-05 | Resolved: 2021-09-07

## 2021-09-07 PROBLEM — N18.30 ACUTE RENAL FAILURE SUPERIMPOSED ON STAGE 3 CHRONIC KIDNEY DISEASE: Status: RESOLVED | Noted: 2021-09-05 | Resolved: 2021-09-07

## 2021-09-07 PROBLEM — K92.2 GI BLEEDING: Status: RESOLVED | Noted: 2021-09-05 | Resolved: 2021-09-07

## 2021-09-07 LAB
ANION GAP SERPL CALC-SCNC: 8 MMOL/L (ref 8–16)
BUN SERPL-MCNC: 17 MG/DL (ref 8–23)
CALCIUM SERPL-MCNC: 8.3 MG/DL (ref 8.7–10.5)
CHLORIDE SERPL-SCNC: 106 MMOL/L (ref 95–110)
CO2 SERPL-SCNC: 25 MMOL/L (ref 23–29)
CREAT SERPL-MCNC: 1 MG/DL (ref 0.5–1.4)
ERYTHROCYTE [DISTWIDTH] IN BLOOD BY AUTOMATED COUNT: 13.6 % (ref 11.5–14.5)
EST. GFR  (AFRICAN AMERICAN): 58.5 ML/MIN/1.73 M^2
EST. GFR  (NON AFRICAN AMERICAN): 50.8 ML/MIN/1.73 M^2
GLUCOSE SERPL-MCNC: 105 MG/DL (ref 70–110)
HCT VFR BLD AUTO: 28.6 % (ref 37–48.5)
HGB BLD-MCNC: 9.7 G/DL (ref 12–16)
MAGNESIUM SERPL-MCNC: 1.8 MG/DL (ref 1.6–2.6)
MCH RBC QN AUTO: 30.2 PG (ref 27–31)
MCHC RBC AUTO-ENTMCNC: 33.9 G/DL (ref 32–36)
MCV RBC AUTO: 89 FL (ref 82–98)
PLATELET # BLD AUTO: 166 K/UL (ref 150–450)
PMV BLD AUTO: 10.2 FL (ref 9.2–12.9)
POTASSIUM SERPL-SCNC: 4.2 MMOL/L (ref 3.5–5.1)
RBC # BLD AUTO: 3.21 M/UL (ref 4–5.4)
SODIUM SERPL-SCNC: 139 MMOL/L (ref 136–145)
STOOL CULTURE: NORMAL
STOOL CULTURE: NORMAL
WBC # BLD AUTO: 5.35 K/UL (ref 3.9–12.7)

## 2021-09-07 PROCEDURE — 36415 COLL VENOUS BLD VENIPUNCTURE: CPT | Performed by: STUDENT IN AN ORGANIZED HEALTH CARE EDUCATION/TRAINING PROGRAM

## 2021-09-07 PROCEDURE — 99232 PR SUBSEQUENT HOSPITAL CARE,LEVL II: ICD-10-PCS | Mod: ,,, | Performed by: INTERNAL MEDICINE

## 2021-09-07 PROCEDURE — 93306 ECHO (CUPID ONLY): ICD-10-PCS | Mod: 26,,, | Performed by: INTERNAL MEDICINE

## 2021-09-07 PROCEDURE — 97116 GAIT TRAINING THERAPY: CPT

## 2021-09-07 PROCEDURE — 99232 SBSQ HOSP IP/OBS MODERATE 35: CPT | Mod: ,,, | Performed by: INTERNAL MEDICINE

## 2021-09-07 PROCEDURE — 97535 SELF CARE MNGMENT TRAINING: CPT

## 2021-09-07 PROCEDURE — 97161 PT EVAL LOW COMPLEX 20 MIN: CPT

## 2021-09-07 PROCEDURE — 94761 N-INVAS EAR/PLS OXIMETRY MLT: CPT

## 2021-09-07 PROCEDURE — 85027 COMPLETE CBC AUTOMATED: CPT | Performed by: STUDENT IN AN ORGANIZED HEALTH CARE EDUCATION/TRAINING PROGRAM

## 2021-09-07 PROCEDURE — 93306 TTE W/DOPPLER COMPLETE: CPT | Mod: 26,,, | Performed by: INTERNAL MEDICINE

## 2021-09-07 PROCEDURE — 25000003 PHARM REV CODE 250: Performed by: NURSE PRACTITIONER

## 2021-09-07 PROCEDURE — 93306 TTE W/DOPPLER COMPLETE: CPT

## 2021-09-07 PROCEDURE — 99900035 HC TECH TIME PER 15 MIN (STAT)

## 2021-09-07 PROCEDURE — 80048 BASIC METABOLIC PNL TOTAL CA: CPT | Performed by: STUDENT IN AN ORGANIZED HEALTH CARE EDUCATION/TRAINING PROGRAM

## 2021-09-07 PROCEDURE — 83735 ASSAY OF MAGNESIUM: CPT | Performed by: NURSE PRACTITIONER

## 2021-09-07 PROCEDURE — 97165 OT EVAL LOW COMPLEX 30 MIN: CPT

## 2021-09-07 RX ADMIN — Medication 2000 UNITS: at 08:09

## 2021-09-07 RX ADMIN — OXYCODONE HYDROCHLORIDE AND ACETAMINOPHEN 1000 MG: 500 TABLET ORAL at 08:09

## 2021-09-07 RX ADMIN — CYANOCOBALAMIN TAB 1000 MCG 1000 MCG: 1000 TAB at 08:09

## 2021-09-08 LAB
AORTIC ROOT ANNULUS: 3.48 CM
AORTIC VALVE CUSP SEPERATION: 2 CM
AV MEAN GRADIENT: 4 MMHG
AV PEAK GRADIENT: 5 MMHG
BSA FOR ECHO PROCEDURE: 1.75 M2
CV ECHO LV RWT: 0.9 CM
DOP CALC AO PEAK VEL: 1.17 M/S
DOP CALC AO VTI: 26.73 CM
DOP CALC LVOT AREA: 3.2 CM2
DOP CALC LVOT DIAMETER: 2.02 CM
E WAVE DECELERATION TIME: 164.9 MSEC
E/A RATIO: 0.92
E/E' RATIO: 8.89 M/S
ECHO LV POSTERIOR WALL: 1.52 CM (ref 0.6–1.1)
EJECTION FRACTION: 65 %
FRACTIONAL SHORTENING: 41 % (ref 28–44)
INTERVENTRICULAR SEPTUM: 1.52 CM (ref 0.6–1.1)
LEFT ATRIUM SIZE: 4.68 CM
LEFT INTERNAL DIMENSION IN SYSTOLE: 1.99 CM (ref 2.1–4)
LEFT VENTRICLE DIASTOLIC VOLUME INDEX: 40.29 ML/M2
LEFT VENTRICLE DIASTOLIC VOLUME: 69.71 ML
LEFT VENTRICLE MASS INDEX: 109 G/M2
LEFT VENTRICLE SYSTOLIC VOLUME INDEX: 19.3 ML/M2
LEFT VENTRICLE SYSTOLIC VOLUME: 33.47 ML
LEFT VENTRICULAR INTERNAL DIMENSION IN DIASTOLE: 3.39 CM (ref 3.5–6)
LEFT VENTRICULAR MASS: 189.37 G
LV LATERAL E/E' RATIO: 7.27 M/S
LV SEPTAL E/E' RATIO: 11.43 M/S
MV PEAK A VEL: 0.87 M/S
MV PEAK E VEL: 0.8 M/S
PISA TR MAX VEL: 3.29 M/S
PV PEAK VELOCITY: 63.56 CM/S
RA PRESSURE: 3 MMHG
RIGHT VENTRICULAR END-DIASTOLIC DIMENSION: 361 CM
TDI LATERAL: 0.11 M/S
TDI SEPTAL: 0.07 M/S
TDI: 0.09 M/S
TR MAX PG: 43 MMHG
TV REST PULMONARY ARTERY PRESSURE: 46 MMHG

## 2021-09-09 LAB
LABCORP MISC TEST CODE: 8623
LABCORP MISC TEST NAME: NORMAL
LABCORP MISCELLANEOUS TEST: NORMAL

## 2021-09-22 ENCOUNTER — OFFICE VISIT (OUTPATIENT)
Dept: CARDIOLOGY | Facility: CLINIC | Age: 86
End: 2021-09-22
Payer: MEDICARE

## 2021-09-22 VITALS
WEIGHT: 140 LBS | RESPIRATION RATE: 16 BRPM | DIASTOLIC BLOOD PRESSURE: 72 MMHG | SYSTOLIC BLOOD PRESSURE: 120 MMHG | HEIGHT: 64 IN | BODY MASS INDEX: 23.9 KG/M2 | OXYGEN SATURATION: 96 % | HEART RATE: 85 BPM

## 2021-09-22 DIAGNOSIS — I45.2 RBBB (RIGHT BUNDLE BRANCH BLOCK WITH LEFT ANTERIOR FASCICULAR BLOCK): ICD-10-CM

## 2021-09-22 DIAGNOSIS — K92.2 GASTROINTESTINAL HEMORRHAGE, UNSPECIFIED GASTROINTESTINAL HEMORRHAGE TYPE: ICD-10-CM

## 2021-09-22 DIAGNOSIS — I10 ESSENTIAL HYPERTENSION: ICD-10-CM

## 2021-09-22 DIAGNOSIS — R26.89 IMBALANCE: Primary | ICD-10-CM

## 2021-09-22 DIAGNOSIS — D64.9 ANEMIA, UNSPECIFIED TYPE: ICD-10-CM

## 2021-09-22 DIAGNOSIS — Z95.0 PACEMAKER: ICD-10-CM

## 2021-09-22 DIAGNOSIS — I95.1 ORTHOSTASIS: ICD-10-CM

## 2021-09-22 PROCEDURE — 99213 OFFICE O/P EST LOW 20 MIN: CPT | Mod: S$GLB,,, | Performed by: GENERAL PRACTICE

## 2021-09-22 PROCEDURE — 99213 PR OFFICE/OUTPT VISIT, EST, LEVL III, 20-29 MIN: ICD-10-PCS | Mod: S$GLB,,, | Performed by: GENERAL PRACTICE

## 2021-09-22 RX ORDER — AMLODIPINE BESYLATE 2.5 MG/1
2.5 TABLET ORAL NIGHTLY
Qty: 90 TABLET | Refills: 3 | Status: SHIPPED | OUTPATIENT
Start: 2021-09-22 | End: 2022-04-06 | Stop reason: SDUPTHER

## 2021-09-22 RX ORDER — LOSARTAN POTASSIUM 50 MG/1
50 TABLET ORAL NIGHTLY
Qty: 90 TABLET | Refills: 3 | Status: SHIPPED | OUTPATIENT
Start: 2021-09-22 | End: 2022-12-06

## 2021-09-22 RX ORDER — LOSARTAN POTASSIUM 50 MG/1
25 TABLET ORAL NIGHTLY
Qty: 90 TABLET | Refills: 3 | Status: SHIPPED | OUTPATIENT
Start: 2021-09-22 | End: 2021-09-22

## 2021-09-22 RX ORDER — IRON,CARBONYL/ASCORBIC ACID 100-250 MG
1 TABLET ORAL DAILY
Qty: 30 EACH | Refills: 1 | Status: SHIPPED | OUTPATIENT
Start: 2021-09-22 | End: 2021-10-11

## 2021-10-11 ENCOUNTER — OFFICE VISIT (OUTPATIENT)
Dept: FAMILY MEDICINE | Facility: CLINIC | Age: 86
End: 2021-10-11
Payer: MEDICARE

## 2021-10-11 VITALS
WEIGHT: 137 LBS | HEART RATE: 81 BPM | BODY MASS INDEX: 23.39 KG/M2 | HEIGHT: 64 IN | DIASTOLIC BLOOD PRESSURE: 64 MMHG | SYSTOLIC BLOOD PRESSURE: 122 MMHG

## 2021-10-11 DIAGNOSIS — Z86.79 HISTORY OF BUNDLE BRANCH BLOCK: ICD-10-CM

## 2021-10-11 DIAGNOSIS — R26.9 GAIT ABNORMALITY: ICD-10-CM

## 2021-10-11 DIAGNOSIS — I25.10 CORONARY ARTERY DISEASE, UNSPECIFIED VESSEL OR LESION TYPE, UNSPECIFIED WHETHER ANGINA PRESENT, UNSPECIFIED WHETHER NATIVE OR TRANSPLANTED HEART: ICD-10-CM

## 2021-10-11 DIAGNOSIS — I10 BENIGN ESSENTIAL HTN: Primary | ICD-10-CM

## 2021-10-11 DIAGNOSIS — K21.9 GASTROESOPHAGEAL REFLUX DISEASE, UNSPECIFIED WHETHER ESOPHAGITIS PRESENT: ICD-10-CM

## 2021-10-11 DIAGNOSIS — M81.0 OSTEOPOROSIS, UNSPECIFIED OSTEOPOROSIS TYPE, UNSPECIFIED PATHOLOGICAL FRACTURE PRESENCE: ICD-10-CM

## 2021-10-11 DIAGNOSIS — Z95.0 PACEMAKER: ICD-10-CM

## 2021-10-11 DIAGNOSIS — M19.90 ARTHRITIS: ICD-10-CM

## 2021-10-11 PROCEDURE — 99204 OFFICE O/P NEW MOD 45 MIN: CPT | Mod: S$GLB,,, | Performed by: NURSE PRACTITIONER

## 2021-10-11 PROCEDURE — 99204 PR OFFICE/OUTPT VISIT, NEW, LEVL IV, 45-59 MIN: ICD-10-PCS | Mod: S$GLB,,, | Performed by: NURSE PRACTITIONER

## 2021-12-17 ENCOUNTER — TELEPHONE (OUTPATIENT)
Dept: CARDIOLOGY | Facility: CLINIC | Age: 86
End: 2021-12-17
Payer: MEDICARE

## 2022-01-06 ENCOUNTER — OFFICE VISIT (OUTPATIENT)
Dept: CARDIOLOGY | Facility: CLINIC | Age: 87
End: 2022-01-06
Payer: MEDICARE

## 2022-01-06 VITALS
BODY MASS INDEX: 23.9 KG/M2 | DIASTOLIC BLOOD PRESSURE: 78 MMHG | HEART RATE: 81 BPM | OXYGEN SATURATION: 91 % | HEIGHT: 64 IN | WEIGHT: 140 LBS | SYSTOLIC BLOOD PRESSURE: 138 MMHG | RESPIRATION RATE: 16 BRPM

## 2022-01-06 DIAGNOSIS — D64.9 ANEMIA, UNSPECIFIED TYPE: ICD-10-CM

## 2022-01-06 DIAGNOSIS — Z95.0 PACEMAKER: Primary | ICD-10-CM

## 2022-01-06 DIAGNOSIS — R94.31 ABNORMAL EKG: ICD-10-CM

## 2022-01-06 DIAGNOSIS — I95.1 ORTHOSTASIS: ICD-10-CM

## 2022-01-06 DIAGNOSIS — I45.2 RBBB (RIGHT BUNDLE BRANCH BLOCK WITH LEFT ANTERIOR FASCICULAR BLOCK): ICD-10-CM

## 2022-01-06 DIAGNOSIS — Z95.0 CARDIAC PACEMAKER IN SITU: ICD-10-CM

## 2022-01-06 DIAGNOSIS — I44.2 THIRD DEGREE AV BLOCK: ICD-10-CM

## 2022-01-06 DIAGNOSIS — I10 ESSENTIAL (PRIMARY) HYPERTENSION: ICD-10-CM

## 2022-01-06 DIAGNOSIS — G62.9 NEUROPATHY: ICD-10-CM

## 2022-01-06 DIAGNOSIS — I44.0 FIRST DEGREE AV BLOCK: ICD-10-CM

## 2022-01-06 PROCEDURE — 99214 PR OFFICE/OUTPT VISIT, EST, LEVL IV, 30-39 MIN: ICD-10-PCS | Mod: S$GLB,,, | Performed by: GENERAL PRACTICE

## 2022-01-06 PROCEDURE — 99214 OFFICE O/P EST MOD 30 MIN: CPT | Mod: S$GLB,,, | Performed by: GENERAL PRACTICE

## 2022-01-06 PROCEDURE — 93000 ELECTROCARDIOGRAM COMPLETE: CPT | Mod: S$GLB,,, | Performed by: GENERAL PRACTICE

## 2022-01-06 PROCEDURE — 93000 EKG 12-LEAD: ICD-10-PCS | Mod: S$GLB,,, | Performed by: GENERAL PRACTICE

## 2022-01-06 NOTE — PROGRESS NOTES
Subjective:    Patient ID:  Vee Cadena is a 88 y.o. female who presents for follow-up of   Chief Complaint   Patient presents with    Follow-up     3 mo        HPI:  Vee Cadena is a 87 y.o. old female who  has a past medical history of Back ache, Back pain, Carotid bruit, Cervical disc disease, Chest pain, Coronary artery disease, Diffuse cystic mastopathy, Elevated blood pressure, Encounter for blood transfusion, Foot pain, left, Generalized osteoarthrosis, Herpes zoster without complications, Hypertension, Neck pain, Neuropathy, Osteoporosis, Palpitations, PONV (postoperative nausea and vomiting), and TIA (transient ischemic attack)..  WAS SEEN BY DR COX IN HOSPITAL 9/5/21  Patient admitted with complaints of bloody stools, bright red blood per rectum.  She has history of pacer implant.  Denies chest pain shortness of breath no PND orthopnea no cough hemoptysis.  Patient has history of left heart catheterization but no reported history of intervention.  Risk factors are positive for family history, hypertension dyslipidemia.  Cardiac hemodynamics otherwise stable.  Telemetry stable.  No supraventricular ventricular arrhythmia.  Pacer function appears to be normal.  ECHO :  · The estimated PA systolic pressure is 46 mmHg.  · The left ventricle is normal in size with mild concentric hypertrophy and normal systolic function.  · The estimated ejection fraction is 65%.  · Normal left ventricular diastolic function.  · Normal right ventricular size with normal right ventricular systolic function.  · Mild left atrial enlargement.  · Mild tricuspid regurgitation.  · Mild-to-moderate mitral regurgitation.  ·    2016:     Impression: NORMAL MYOCARDIAL PERFUSION   1. The perfusion scan is free of evidence for myocardial ischemia or injury.   2. Resting wall motion is physiologic.   3. Resting LV function is normal.   4. The ventricular volumes are normal at rest and stress.         SHE REPORTS THAT ABOUT 2 TIMES  YEAR SHOW GETS IN HIS DISCOMFORT THIS STARTS IN HER MID CHEST AND GOES UP TO BOTH SIDES OF HER JAW.  IT DOES NOT LAST VERY LONG AS LONG SHE TAKES TUMS IT WILL GO AWAY..     ON GOING TO ADD IRON TO HER REGIMEN BECAUSE SHE HAD RECENT OF BLEEDING AND SIGNIFICANT ANEMIA.  She does describe imbalance and her blood pressure is very well controlled currently 120/72 but she might be better off to let her pressure come up to 140-150 systolic to prevent orthostatic blood pressure changes when she stands up.  I recommend continue exercise specially anything that was strength in her legs muscles.  Will enroll her in the pacemaker clinic and get the pacemaker in her rolled make sure she is has appropriate pacemaker function.  Her pacemaker site on her chest looks like it is well healed.  The discomfort in her chest and neck is likely acid reflux we can evaluate that at a later date if indicated.  Her cholesterol is at goal and her hemoglobin A1c is at goal 5.6.     Follow up in about 3 months (around 12/22/2021 1/6/21 87 yo WF with:  DDD pacemaker December 2019.  Left heart catheterization 618 2003 with normal coronary arteries.  Myocardial perfusion scan 2016-for ischemia.    She is here for routine follow-up.  She is doing well.  She has not really had any more episodes of near syncope.  Imbalance is decreased.  She is still taking amlodipine 2.5 and losartan 50 mg. She is active she does yoga and she walks half a mi to the mailbox and back every day.  She continues to live alone.  Answered multiple questions about the pacemaker and the 1st degree AV block with left axis deviation and right bundle branch block.  She has not had any more blood work done since she started iron.  She has not been taking aspirin and ibuprofen because of GI bleed history and was stopped by GI.          Review of patient's allergies indicates:   Allergen Reactions    Demerol [meperidine]      restless       Past Medical History:   Diagnosis Date     Back ache     Back pain     Carotid bruit     Cervical disc disease     Chest pain     Coronary artery disease     Diffuse cystic mastopathy     Elevated blood pressure     Encounter for blood transfusion     Foot pain, left     Generalized osteoarthrosis     Herpes zoster without complications     Hypertension     Neck pain     Neuropathy     Osteoporosis     Palpitations     PONV (postoperative nausea and vomiting)     TIA (transient ischemic attack)      Past Surgical History:   Procedure Laterality Date    APPENDECTOMY      bladder and colon lift and retached      CARDIAC CATHETERIZATION      CATARACT EXTRACTION  02/23/2009    CHOLECYSTECTOMY      COLONOSCOPY Left 9/6/2021    Procedure: COLONOSCOPY;  Surgeon: Erlin Lainez MD;  Location: [facility] END[facility];  Service: Endoscopy;  Laterality: Left;    ESOPHAGOGASTRODUODENOSCOPY Left 9/6/2021    Procedure: EGD (ESOPHAGOGASTRODUODENOSCOPY);  Surgeon: Erlin Lainez MD;  Location: [facility];  Service: Endoscopy;  Laterality: Left;    INSERTION OF PACEMAKER  12/2019    pt reported    TOTAL ABDOMINAL HYSTERECTOMY      TUBAL LIGATION       Social History     Tobacco Use    Smoking status: Never Smoker    Smokeless tobacco: Never Used   Substance Use Topics    Alcohol use: Not Currently     Comment: socially if ever     Family History   Problem Relation Age of Onset    Heart disease Father     Thyroid disease Father     Diabetes Father     Stroke Father     Diabetes Brother     Hypertension Brother     Hyperlipidemia Brother     Heart disease Brother     Cancer Brother         lung    Hypertension Sister     Diabetes Sister     Heart disease Sister     Hyperlipidemia Sister     Dementia Mother     Down syndrome Daughter     COPD Daughter         Review of Systems:   Constitution: Negative for diaphoresis and fever.   HEENT: Negative for nosebleeds.    Cardiovascular: Negative for chest pain       No dyspnea on  exertion       No leg swelling        No palpitations  Respiratory: Negative for shortness of breath and wheezing.    Hematologic/Lymphatic: Negative for bleeding problem. Does not bruise/bleed easily.   Skin: Negative for color change and rash.   Musculoskeletal: Negative for falls and myalgias.   Gastrointestinal: Negative for hematemesis and hematochezia.   Genitourinary: Negative for hematuria.   Neurological: Negative for dizziness and light-headedness.   Psychiatric/Behavioral: Negative for altered mental status and memory loss.          Objective:        Vitals:    01/06/22 1349   BP: 138/78   Pulse: 81   Resp: 16       Lab Results   Component Value Date    WBC 5.35 09/07/2021    HGB 9.7 (L) 09/07/2021    HCT 28.6 (L) 09/07/2021     09/07/2021    CHOL 150 12/02/2020    TRIG 62 12/02/2020    HDL 61 12/02/2020    ALT 13 09/05/2021    AST 15 09/05/2021     09/07/2021    K 4.2 09/07/2021     09/07/2021    CREATININE 1.0 09/07/2021    BUN 17 09/07/2021    CO2 25 09/07/2021    TSH 0.993 07/01/2016    INR 1.1 09/05/2021    HGBA1C 5.6 07/01/2016        ECHOCARDIOGRAM RESULTS  Results for orders placed during the hospital encounter of 09/05/21    Echo Saline Bubble? No    Interpretation Summary  · The estimated PA systolic pressure is 46 mmHg.  · The left ventricle is normal in size with mild concentric hypertrophy and normal systolic function.  · The estimated ejection fraction is 65%.  · Normal left ventricular diastolic function.  · Normal right ventricular size with normal right ventricular systolic function.  · Mild left atrial enlargement.  · Mild tricuspid regurgitation.  · Mild-to-moderate mitral regurgitation.        CURRENT/PREVIOUS VISIT EKG  Results for orders placed or performed during the hospital encounter of 09/05/21   EKG 12-lead    Collection Time: 09/05/21 10:56 AM    Narrative    Test Reason : K92.2,    Vent. Rate : 073 BPM     Atrial Rate : 073 BPM     P-R Int : 284 ms           QRS Dur : 144 ms      QT Int : 422 ms       P-R-T Axes : 044 -56 -26 degrees     QTc Int : 464 ms    Sinus rhythm with 1st degree A-V block  Right bundle branch block  Left anterior fascicular block   Bifascicular block   T wave abnormality, consider lateral ischemia  Abnormal ECG  When compared with ECG of 31-AUG-2000 14:02,  (RBBB and left anterior fascicular block) is now Present  Confirmed by Kalpesh Delgado MD ([id number]) on 9/12/2021 3:52:40 PM    Referred By: NIDHI   SELF           Confirmed By:Kalpesh Delgado MD     No valid procedures specified.   No results found for this or any previous visit.      Physical Exam:  CONSTITUTIONAL: No fever, no chills  HEENT: Normocephalic, atraumatic,pupils reactive to light                 NECK:  No JVD no carotid bruit  CVS: S1S2+, RRR, no murmurs,   LUNGS: Clear  ABDOMEN: Soft, NT, BS+  EXTREMITIES: No cyanosis, edema  : No alvarado catheter  NEURO: AAO X 3  PSY: Normal affect      Medication List with Changes/Refills   Current Medications    AMLODIPINE (NORVASC) 2.5 MG TABLET    Take 1 tablet (2.5 mg total) by mouth every evening.    CHOLECALCIFEROL, VITAMIN D3, (VITAMIN D3) 25 MCG (1,000 UNIT) CAPSULE    Take 1,000 Units by mouth once daily.    CYANOCOBALAMIN 500 MCG TABLET    Take 500 mcg by mouth once daily.    LOSARTAN (COZAAR) 50 MG TABLET    Take 1 tablet (50 mg total) by mouth every evening.    TRIAMCINOLONE ACETONIDE 0.1% (KENALOG) 0.1 % CREAM    Apply topically 2 (two) times daily.    UNABLE TO FIND    Apply 3 Pump topically 5 (five) times daily. Ketoprofen/Cyclobenzaprine/Lidocaine Hcl (lipomax) 10/2/5%   APPLY UP TO 4.8 GRAMS (3 PUMPS) TO PAINFUL AREAS UP TO FIVE TIMES DAILY. RUB IN WELL    VITAMIN E 400 UNIT CAPSULE    Take 400 Units by mouth once daily.             Assessment:       1. Pacemaker    2. Cardiac pacemaker in situ    3. RBBB (right bundle branch block with left anterior fascicular block)    4. Abnormal EKG    5. Orthostasis    6. First degree  AV block    7. Third degree AV block    8. Anemia, unspecified type    9. Neuropathy    10. Essential (primary) hypertension          Plan:     Problem List Items Addressed This Visit        Cardiac/Vascular    Pacemaker - Primary    Relevant Orders    IN OFFICE EKG 12-LEAD (to Muse)    CBC Without Differential    Comprehensive Metabolic Panel    Lipid Panel      Other Visit Diagnoses     Cardiac pacemaker in situ        Relevant Orders    CBC Without Differential    Comprehensive Metabolic Panel    Lipid Panel    RBBB (right bundle branch block with left anterior fascicular block)        Relevant Orders    CBC Without Differential    Comprehensive Metabolic Panel    Lipid Panel    Abnormal EKG        Relevant Orders    CBC Without Differential    Comprehensive Metabolic Panel    Lipid Panel    Orthostasis        Relevant Orders    CBC Without Differential    Comprehensive Metabolic Panel    Lipid Panel    First degree AV block        Relevant Orders    CBC Without Differential    Comprehensive Metabolic Panel    Lipid Panel    Third degree AV block        Relevant Orders    CBC Without Differential    Comprehensive Metabolic Panel    Lipid Panel    Anemia, unspecified type        Relevant Orders    CBC Without Differential    Comprehensive Metabolic Panel    Lipid Panel    Neuropathy        Relevant Orders    CBC Without Differential    Comprehensive Metabolic Panel    Lipid Panel    Essential (primary) hypertension         Relevant Orders    Lipid Panel        She is currently doing very well.  Her last pacemaker check was September by [employer].  She should be due flu another pacemaker check and approximately March.  She is taking hydralazine 10 mg once or twice since last visit for spike in blood pressure.  She has not having any significant symptoms.  I recommend continue the same medications for now notify us if she becomes dizzy or lightheaded and we can adjust her medications.  Continue to remain active.  I  would recommend she continue to hold the aspirin.  Will stop the iron and recheck the blood count..    No follow-ups on file.    The patients questions were answered, they verbalized understanding, and agreed with the treatment plan.     TOI JOHNSTON MD  [facility]

## 2022-02-22 ENCOUNTER — TELEPHONE (OUTPATIENT)
Dept: FAMILY MEDICINE | Facility: CLINIC | Age: 87
End: 2022-02-22
Payer: MEDICARE

## 2022-02-22 NOTE — TELEPHONE ENCOUNTER
----- Message from Carolann Zuñiga sent at 2/22/2022 11:22 AM CST -----  Pt calling for refills on Ketoprofen/cyclobenzaprine/lidocaine compound cream dispense in pump bottle for foot to tx neuropathy to the [facility] in [place] pharm # [phone], Pt  Cb # [phone]

## 2022-02-24 ENCOUNTER — OFFICE VISIT (OUTPATIENT)
Dept: FAMILY MEDICINE | Facility: CLINIC | Age: 87
End: 2022-02-24
Payer: MEDICARE

## 2022-02-24 VITALS
DIASTOLIC BLOOD PRESSURE: 70 MMHG | BODY MASS INDEX: 23.73 KG/M2 | HEART RATE: 80 BPM | WEIGHT: 139 LBS | SYSTOLIC BLOOD PRESSURE: 132 MMHG | HEIGHT: 64 IN

## 2022-02-24 DIAGNOSIS — I25.10 CORONARY ARTERY DISEASE, UNSPECIFIED VESSEL OR LESION TYPE, UNSPECIFIED WHETHER ANGINA PRESENT, UNSPECIFIED WHETHER NATIVE OR TRANSPLANTED HEART: ICD-10-CM

## 2022-02-24 DIAGNOSIS — R26.9 GAIT ABNORMALITY: Primary | ICD-10-CM

## 2022-02-24 DIAGNOSIS — Z86.79 HISTORY OF BUNDLE BRANCH BLOCK: ICD-10-CM

## 2022-02-24 DIAGNOSIS — Z95.0 PACEMAKER: ICD-10-CM

## 2022-02-24 DIAGNOSIS — Z79.899 HIGH RISK MEDICATION USE: ICD-10-CM

## 2022-02-24 DIAGNOSIS — G62.9 NEUROPATHY: ICD-10-CM

## 2022-02-24 DIAGNOSIS — G47.00 INSOMNIA, UNSPECIFIED TYPE: ICD-10-CM

## 2022-02-24 PROCEDURE — 99214 PR OFFICE/OUTPT VISIT, EST, LEVL IV, 30-39 MIN: ICD-10-PCS | Mod: S$GLB,,, | Performed by: NURSE PRACTITIONER

## 2022-02-24 PROCEDURE — 99214 OFFICE O/P EST MOD 30 MIN: CPT | Mod: S$GLB,,, | Performed by: NURSE PRACTITIONER

## 2022-02-24 RX ORDER — ZOLPIDEM TARTRATE 5 MG/1
TABLET ORAL
COMMUNITY
End: 2022-02-24 | Stop reason: SDUPTHER

## 2022-02-24 NOTE — PROGRESS NOTES
SUBJECTIVE:    Patient ID: Vee Cadena is a 88 y.o. female.    Chief Complaint: Medication Refill (Has some things to go over, brought bottles, C-scope req Emma// SW)    88 year old female presents for check up.  Recently moved back to [facility].  Living alone. Treated for htn, and insomnia.  followedby dr. Jett eldridge. Recently had echocardiogram. Ejection fraction 65%. Last stress test 1 year ago. Wnl. Had pacemaker checked recently.has been experiencing intermittent dizziness. Comes and goes. Thinks it is related to norvasc. Cardio does not think so.  Appetite is ok. Still drives. Sleeps ok.       Office Visit on 02/24/2022   Component Date Value Ref Range Status    WBC 02/28/2022 5.1  3.8 - 10.8 Thousand/uL Final    RBC 02/28/2022 4.14  3.80 - 5.10 Million/uL Final    Hemoglobin 02/28/2022 12.4  11.7 - 15.5 g/dL Final    Hematocrit 02/28/2022 36.7  35.0 - 45.0 % Final    MCV 02/28/2022 88.6  80.0 - 100.0 fL Final    MCH 02/28/2022 30.0  27.0 - 33.0 pg Final    MCHC 02/28/2022 33.8  32.0 - 36.0 g/dL Final    RDW 02/28/2022 12.9  11.0 - 15.0 % Final    Platelets 02/28/2022 209  140 - 400 Thousand/uL Final    MPV 02/28/2022 10.4  7.5 - 12.5 fL Final    Neutrophils, Abs 02/28/2022 3,488  1,500 - 7,800 cells/uL Final    Lymph # 02/28/2022 1,051  850 - 3,900 cells/uL Final    Mono # 02/28/2022 459  200 - 950 cells/uL Final    Eos # 02/28/2022 71  15 - 500 cells/uL Final    Baso # 02/28/2022 31  0 - 200 cells/uL Final    Neutrophils Relative 02/28/2022 68.4  % Final    Lymph % 02/28/2022 20.6  % Final    Mono % 02/28/2022 9.0  % Final    Eosinophil % 02/28/2022 1.4  % Final    Basophil % 02/28/2022 0.6  % Final    Glucose 02/28/2022 95  65 - 99 mg/dL Final    BUN 02/28/2022 22  7 - 25 mg/dL Final    Creatinine 02/28/2022 1.12 (A) 0.60 - 0.88 mg/dL Final    eGFR if non  02/28/2022 44 (A) > OR = 60 mL/min/1.73m2 Final    eGFR if  02/28/2022 51 (A) > OR  = 60 mL/min/1.73m2 Final    BUN/Creatinine Ratio 02/28/2022 20  6 - 22 (calc) Final    Sodium 02/28/2022 134 (A) 135 - 146 mmol/L Final    Potassium 02/28/2022 4.2  3.5 - 5.3 mmol/L Final    Chloride 02/28/2022 99  98 - 110 mmol/L Final    CO2 02/28/2022 28  20 - 32 mmol/L Final    Calcium 02/28/2022 9.0  8.6 - 10.4 mg/dL Final    Total Protein 02/28/2022 6.2  6.1 - 8.1 g/dL Final    Albumin 02/28/2022 3.8  3.6 - 5.1 g/dL Final    Globulin, Total 02/28/2022 2.4  1.9 - 3.7 g/dL (calc) Final    Albumin/Globulin Ratio 02/28/2022 1.6  1.0 - 2.5 (calc) Final    Total Bilirubin 02/28/2022 0.7  0.2 - 1.2 mg/dL Final    Alkaline Phosphatase 02/28/2022 59  37 - 153 U/L Final    AST 02/28/2022 13  10 - 35 U/L Final    ALT 02/28/2022 10  6 - 29 U/L Final    Cholesterol 02/28/2022 138  <200 mg/dL Final    HDL 02/28/2022 57  > OR = 50 mg/dL Final    Triglycerides 02/28/2022 65  <150 mg/dL Final    LDL Cholesterol 02/28/2022 67  mg/dL (calc) Final    HDL/Cholesterol Ratio 02/28/2022 2.4  <5.0 (calc) Final    Non HDL Chol. (LDL+VLDL) 02/28/2022 81  <130 mg/dL (calc) Final    TSH w/reflex to FT4 02/28/2022 2.05  0.40 - 4.50 mIU/L Final    Creatinine, Urine 02/28/2022 29  20 - 275 mg/dL Final    Microalb, Ur 02/28/2022 1.2  See Note: mg/dL Final    Microalb/Creat Ratio 02/28/2022 41 (A) <30 mcg/mg creat Final    Color, UA 02/28/2022 YELLOW  YELLOW Final    Appearance, UA 02/28/2022 CLOUDY (A) CLEAR Final    Specific Colman, UA 02/28/2022 1.006  1.001 - 1.035 Final    pH, UA 02/28/2022 7.5  5.0 - 8.0 Final    Glucose, UA 02/28/2022 NEGATIVE  NEGATIVE Final    Bilirubin, UA 02/28/2022 NEGATIVE  NEGATIVE Final    Ketones, UA 02/28/2022 NEGATIVE  NEGATIVE Final    Occult Blood UA 02/28/2022 TRACE (A) NEGATIVE Final    Protein, UA 02/28/2022 NEGATIVE  NEGATIVE Final    Nitrite, UA 02/28/2022 POSITIVE (A) NEGATIVE Final    Leukocytes, UA 02/28/2022 3+ (A) NEGATIVE Final    WBC Casts, UA 02/28/2022 > OR  = 60 (A) < OR = 5 /HPF Final    RBC Casts, UA 02/28/2022 3-10 (A) < OR = 2 /HPF Final    Squam Epithel, UA 02/28/2022 0-5  < OR = 5 /HPF Final    Bacteria, UA 02/28/2022 MODERATE (A) NONE SEEN /HPF Final    Hyaline Casts, UA 02/28/2022 NONE SEEN  NONE SEEN /LPF Final    Comments: 02/28/2022 FEW MUCOUS THREADS   Final    Reflexive Urine Culture 02/28/2022    Final    Urine Culture, Routine 02/28/2022  (A)  Final   Admission on 09/05/2021, Discharged on 09/07/2021   Component Date Value Ref Range Status    WBC 09/05/2021 5.87  3.90 - 12.70 K/uL Final    RBC 09/05/2021 3.95 (A) 4.00 - 5.40 M/uL Final    Hemoglobin 09/05/2021 11.5 (A) 12.0 - 16.0 g/dL Final    Hematocrit 09/05/2021 35.1 (A) 37.0 - 48.5 % Final    MCV 09/05/2021 89  82 - 98 fL Final    MCH 09/05/2021 29.1  27.0 - 31.0 pg Final    MCHC 09/05/2021 32.8  32.0 - 36.0 g/dL Final    RDW 09/05/2021 13.4  11.5 - 14.5 % Final    Platelets 09/05/2021 201  150 - 450 K/uL Final    MPV 09/05/2021 10.3  9.2 - 12.9 fL Final    Immature Granulocytes 09/05/2021 0.3  0.0 - 0.5 % Final    Gran # (ANC) 09/05/2021 4.4  1.8 - 7.7 K/uL Final    Immature Grans (Abs) 09/05/2021 0.02  0.00 - 0.04 K/uL Final    Lymph # 09/05/2021 0.8 (A) 1.0 - 4.8 K/uL Final    Mono # 09/05/2021 0.5  0.3 - 1.0 K/uL Final    Eos # 09/05/2021 0.2  0.0 - 0.5 K/uL Final    Baso # 09/05/2021 0.03  0.00 - 0.20 K/uL Final    nRBC 09/05/2021 0  0 /100 WBC Final    Gran % 09/05/2021 74.2 (A) 38.0 - 73.0 % Final    Lymph % 09/05/2021 14.1 (A) 18.0 - 48.0 % Final    Mono % 09/05/2021 8.0  4.0 - 15.0 % Final    Eosinophil % 09/05/2021 2.9  0.0 - 8.0 % Final    Basophil % 09/05/2021 0.5  0.0 - 1.9 % Final    Differential Method 09/05/2021 Automated   Final    Sodium 09/05/2021 136  136 - 145 mmol/L Final    Potassium 09/05/2021 4.5  3.5 - 5.1 mmol/L Final    Chloride 09/05/2021 100  95 - 110 mmol/L Final    CO2 09/05/2021 25  23 - 29 mmol/L Final    Glucose 09/05/2021 113  (A) 70 - 110 mg/dL Final    BUN 09/05/2021 31 (A) 8 - 23 mg/dL Final    Creatinine 09/05/2021 1.5 (A) 0.5 - 1.4 mg/dL Final    Calcium 09/05/2021 8.6 (A) 8.7 - 10.5 mg/dL Final    Total Protein 09/05/2021 6.4  6.0 - 8.4 g/dL Final    Albumin 09/05/2021 3.7  3.5 - 5.2 g/dL Final    Total Bilirubin 09/05/2021 0.7  0.1 - 1.0 mg/dL Final    Alkaline Phosphatase 09/05/2021 51 (A) 55 - 135 U/L Final    AST 09/05/2021 15  10 - 40 U/L Final    ALT 09/05/2021 13  10 - 44 U/L Final    Anion Gap 09/05/2021 11  8 - 16 mmol/L Final    eGFR if African American 09/05/2021 35.9 (A) >60 mL/min/1.73 m^2 Final    eGFR if non African American 09/05/2021 31.1 (A) >60 mL/min/1.73 m^2 Final    Group & Rh 09/05/2021 A NEG   Final    Indirect Carol 09/05/2021 NEG   Final    PT 09/05/2021 13.8  11.8 - 14.3 sec Final    INR 09/05/2021 1.1   Final    Lipase 09/05/2021 32  4 - 60 U/L Final    Stool Culture 09/05/2021 No Salmonella,Shigella,Vibrio,Campylobacter.   Final    Stool Culture 09/05/2021 No E coli 0157:H7 isolated.   Final    Stool WBC 09/05/2021 No neutrophils seen  No neutrophils seen Final    Occult Blood 09/05/2021 Positive (A) Negative Final    C. diff Antigen 09/05/2021 Negative  Negative Final    C difficile Toxins A+B, EIA 09/05/2021 Negative  Negative Final    SARS-CoV-2 RNA, Amplification, Qual 09/05/2021 Negative  Negative Final    Hemoglobin 09/05/2021 9.8 (A) 12.0 - 16.0 g/dL Final    Hematocrit 09/05/2021 29.9 (A) 37.0 - 48.5 % Final    [facility] Miscellaneous Test 09/05/2021 COMMENT   Final    [facility] Test Code: 09/05/2021 [id number]   Final    [facility] Test Name: 09/05/2021 Ova & Parasites Exam   Final    Troponin I 09/05/2021 <0.030  <=0.040 ng/mL Final    CPK 09/05/2021 19 (A) 20 - 180 U/L Final    CPK MB 09/05/2021 1.0  0.1 - 6.5 ng/mL Final    BSA 09/07/2021 1.75  m2 Final    TDI SEPTAL 09/07/2021 0.07  m/s Final    LV LATERAL E/E' RATIO 09/07/2021 7.27  m/s Final    LV SEPTAL E/E'  RATIO 09/07/2021 11.43  m/s Final    Right Atrial Pressure (from IVC) 09/07/2021 3  mmHg Final    AORTIC VALVE CUSP SEPERATION 09/07/2021 2.00  cm Final    TDI LATERAL 09/07/2021 0.11  m/s Final    PV PEAK VELOCITY 09/07/2021 63.56  cm/s Final    LVIDd 09/07/2021 3.39 (A) 3.5 - 6.0 cm Final    IVS 09/07/2021 1.52 (A) 0.6 - 1.1 cm Final    Posterior Wall 09/07/2021 1.52 (A) 0.6 - 1.1 cm Final    Ao root annulus 09/07/2021 3.48  cm Final    LVIDs 09/07/2021 1.99 (A) 2.1 - 4.0 cm Final    FS 09/07/2021 41  28 - 44 % Final    LV mass 09/07/2021 189.37  g Final    LA size 09/07/2021 4.68  cm Final    RVDD 09/07/2021 361.00  cm Final    Left Ventricle Relative Wall Thick* 09/07/2021 0.90  cm Final    AV mean gradient 09/07/2021 4  mmHg Final    E/A ratio 09/07/2021 0.92   Final    Mean e' 09/07/2021 0.09  m/s Final    E wave deceleration time 09/07/2021 164.90  msec Final    LVOT diameter 09/07/2021 2.02  cm Final    LVOT area 09/07/2021 3.2  cm2 Final    Ao peak jake 09/07/2021 1.17  m/s Final    Ao VTI 09/07/2021 26.73  cm Final    AV peak gradient 09/07/2021 5  mmHg Final    TV rest pulmonary artery pressure 09/07/2021 46  mmHg Final    E/E' ratio 09/07/2021 8.89  m/s Final    MV Peak E Jake 09/07/2021 0.80  m/s Final    TR Max Jake 09/07/2021 3.29  m/s Final    MV Peak A Jake 09/07/2021 0.87  m/s Final    LV Systolic Volume 09/07/2021 33.47  mL Final    LV Systolic Volume Index 09/07/2021 19.3  mL/m2 Final    LV Diastolic Volume 09/07/2021 69.71  mL Final    LV Diastolic Volume Index 09/07/2021 40.29  mL/m2 Final    LV Mass Index 09/07/2021 109  g/m2 Final    Triscuspid Valve Regurgitation Pea* 09/07/2021 43  mmHg Final    EF 09/07/2021 65  % Final    UNIT NUMBER 09/05/2021 [id number]   Final    Product Code 09/05/2021 [id number]   Final    DISPENSE STATUS 09/05/2021 TRANSFUSED   Final    CODING SYSTEM 09/05/2021 [id number]   Final    Unit Blood Type Code 09/05/2021 0600   Final     Unit Blood Type 09/05/2021 A NEG   Final    Unit Expiration 09/05/2021 [id number]   Final    Sodium 09/06/2021 138  136 - 145 mmol/L Final    Potassium 09/06/2021 4.2  3.5 - 5.1 mmol/L Final    Chloride 09/06/2021 106  95 - 110 mmol/L Final    CO2 09/06/2021 24  23 - 29 mmol/L Final    Glucose 09/06/2021 100  70 - 110 mg/dL Final    BUN 09/06/2021 22  8 - 23 mg/dL Final    Creatinine 09/06/2021 1.1  0.5 - 1.4 mg/dL Final    Calcium 09/06/2021 8.0 (A) 8.7 - 10.5 mg/dL Final    Anion Gap 09/06/2021 8  8 - 16 mmol/L Final    eGFR if  09/06/2021 52.2 (A) >60 mL/min/1.73 m^2 Final    eGFR if non African American 09/06/2021 45.3 (A) >60 mL/min/1.73 m^2 Final    Magnesium 09/06/2021 1.9  1.6 - 2.6 mg/dL Final    WBC 09/06/2021 7.46  3.90 - 12.70 K/uL Final    RBC 09/06/2021 3.40 (A) 4.00 - 5.40 M/uL Final    Hemoglobin 09/06/2021 10.0 (A) 12.0 - 16.0 g/dL Final    Hematocrit 09/06/2021 30.0 (A) 37.0 - 48.5 % Final    MCV 09/06/2021 88  82 - 98 fL Final    MCH 09/06/2021 29.4  27.0 - 31.0 pg Final    MCHC 09/06/2021 33.3  32.0 - 36.0 g/dL Final    RDW 09/06/2021 13.6  11.5 - 14.5 % Final    Platelets 09/06/2021 173  150 - 450 K/uL Final    MPV 09/06/2021 10.3  9.2 - 12.9 fL Final    Immature Granulocytes 09/06/2021 0.3  0.0 - 0.5 % Final    Gran # (ANC) 09/06/2021 4.8  1.8 - 7.7 K/uL Final    Immature Grans (Abs) 09/06/2021 0.02  0.00 - 0.04 K/uL Final    Lymph # 09/06/2021 1.5  1.0 - 4.8 K/uL Final    Mono # 09/06/2021 0.8  0.3 - 1.0 K/uL Final    Eos # 09/06/2021 0.3  0.0 - 0.5 K/uL Final    Baso # 09/06/2021 0.02  0.00 - 0.20 K/uL Final    nRBC 09/06/2021 0  0 /100 WBC Final    Gran % 09/06/2021 64.6  38.0 - 73.0 % Final    Lymph % 09/06/2021 20.6  18.0 - 48.0 % Final    Mono % 09/06/2021 10.7  4.0 - 15.0 % Final    Eosinophil % 09/06/2021 3.5  0.0 - 8.0 % Final    Basophil % 09/06/2021 0.3  0.0 - 1.9 % Final    Differential Method 09/06/2021 Automated   Final     Troponin I 09/06/2021 <0.030  <=0.040 ng/mL Final    CPK 09/06/2021 23  20 - 180 U/L Final    CPK MB 09/06/2021 1.2  0.1 - 6.5 ng/mL Final    Troponin I 09/06/2021 <0.030  <=0.040 ng/mL Final    CPK 09/06/2021 48  20 - 180 U/L Final    CPK MB 09/06/2021 1.7  0.1 - 6.5 ng/mL Final    Hemoglobin 09/06/2021 10.7 (A) 12.0 - 16.0 g/dL Final    Hematocrit 09/06/2021 32.3 (A) 37.0 - 48.5 % Final    Magnesium 09/07/2021 1.8  1.6 - 2.6 mg/dL Final    WBC 09/07/2021 5.35  3.90 - 12.70 K/uL Final    RBC 09/07/2021 3.21 (A) 4.00 - 5.40 M/uL Final    Hemoglobin 09/07/2021 9.7 (A) 12.0 - 16.0 g/dL Final    Hematocrit 09/07/2021 28.6 (A) 37.0 - 48.5 % Final    MCV 09/07/2021 89  82 - 98 fL Final    MCH 09/07/2021 30.2  27.0 - 31.0 pg Final    MCHC 09/07/2021 33.9  32.0 - 36.0 g/dL Final    RDW 09/07/2021 13.6  11.5 - 14.5 % Final    Platelets 09/07/2021 166  150 - 450 K/uL Final    MPV 09/07/2021 10.2  9.2 - 12.9 fL Final    Sodium 09/07/2021 139  136 - 145 mmol/L Final    Potassium 09/07/2021 4.2  3.5 - 5.1 mmol/L Final    Chloride 09/07/2021 106  95 - 110 mmol/L Final    CO2 09/07/2021 25  23 - 29 mmol/L Final    Glucose 09/07/2021 105  70 - 110 mg/dL Final    BUN 09/07/2021 17  8 - 23 mg/dL Final    Creatinine 09/07/2021 1.0  0.5 - 1.4 mg/dL Final    Calcium 09/07/2021 8.3 (A) 8.7 - 10.5 mg/dL Final    Anion Gap 09/07/2021 8  8 - 16 mmol/L Final    eGFR if  09/07/2021 58.5 (A) >60 mL/min/1.73 m^2 Final    eGFR if non African American 09/07/2021 50.8 (A) >60 mL/min/1.73 m^2 Final       Past Medical History:   Diagnosis Date    Back ache     Back pain     Carotid bruit     Cervical disc disease     Chest pain     Coronary artery disease     Diffuse cystic mastopathy     Elevated blood pressure     Encounter for blood transfusion     Foot pain, left     Generalized osteoarthrosis     Herpes zoster without complications     Hypertension     Neck pain     Neuropathy      Osteoporosis     Palpitations     PONV (postoperative nausea and vomiting)     TIA (transient ischemic attack)      Social History     Socioeconomic History    Marital status:     Number of children: 6   Tobacco Use    Smoking status: Never Smoker    Smokeless tobacco: Never Used   Substance and Sexual Activity    Alcohol use: Not Currently     Comment: socially if ever   Social History Narrative    Sleeps ok. Takes melatonin 5mg . About 7hrs    2 days per week     Walks 3-4 days per week     Past Surgical History:   Procedure Laterality Date    APPENDECTOMY      bladder and colon lift and retached      CARDIAC CATHETERIZATION      CATARACT EXTRACTION  02/23/2009    CHOLECYSTECTOMY      COLONOSCOPY Left 9/6/2021    Procedure: COLONOSCOPY;  Surgeon: Erlin Lainez MD;  Location: [facility] ENDO;  Service: Endoscopy;  Laterality: Left;    ESOPHAGOGASTRODUODENOSCOPY Left 9/6/2021    Procedure: EGD (ESOPHAGOGASTRODUODENOSCOPY);  Surgeon: Erlin Lainez MD;  Location: [facility] ENDO;  Service: Endoscopy;  Laterality: Left;    INSERTION OF PACEMAKER  12/2019    pt reported    TOTAL ABDOMINAL HYSTERECTOMY      TUBAL LIGATION       Family History   Problem Relation Age of Onset    Heart disease Father     Thyroid disease Father     Diabetes Father     Stroke Father     Diabetes Brother     Hypertension Brother     Hyperlipidemia Brother     Heart disease Brother     Cancer Brother         lung    Hypertension Sister     Diabetes Sister     Heart disease Sister     Hyperlipidemia Sister     Dementia Mother     Down syndrome Daughter     COPD Daughter        Review of patient's allergies indicates:   Allergen Reactions    Demerol [meperidine]      restless       Current Outpatient Medications:     amLODIPine (NORVASC) 2.5 MG tablet, Take 1 tablet (2.5 mg total) by mouth every evening., Disp: 90 tablet, Rfl: 3    cholecalciferol, vitamin D3, (VITAMIN D3) 25 mcg (1,000 unit) capsule,  "Take 1,000 Units by mouth once daily., Disp: , Rfl:     cyanocobalamin 500 MCG tablet, Take 500 mcg by mouth once daily., Disp: , Rfl:     losartan (COZAAR) 50 MG tablet, Take 1 tablet (50 mg total) by mouth every evening., Disp: 90 tablet, Rfl: 3    triamcinolone acetonide 0.1% (KENALOG) 0.1 % cream, Apply topically 2 (two) times daily. (Patient taking differently: Apply 1 g topically 2 (two) times daily.), Disp: 45 g, Rfl: 1    UNABLE TO FIND, Apply 3 Pump topically 5 (five) times daily. Ketoprofen/Cyclobenzaprine/Lidocaine Hcl (lipomax) 10/2/5%  APPLY UP TO 4.8 GRAMS (3 PUMPS) TO PAINFUL AREAS UP TO FIVE TIMES DAILY. RUB IN WELL, Disp: , Rfl:     vitamin E 400 UNIT capsule, Take 400 Units by mouth once daily., Disp: , Rfl:     lecithin-isopropyl palm, bulk, (LIPMAX) Liqd, 3 Pump by Misc.(Non-Drug; Combo Route) route 4 (four) times daily., Disp: 120 mL, Rfl: 0    zolpidem (AMBIEN) 5 MG Tab, Take 1/2 tablet nightly prn, Disp: 45 tablet, Rfl: 1    Review of Systems   Constitutional: Negative for chills, fever and unexpected weight change.   HENT: Negative for ear pain, rhinorrhea and sore throat.    Eyes: Negative for pain and visual disturbance.   Respiratory: Negative for cough and shortness of breath.    Cardiovascular: Negative for chest pain, palpitations and leg swelling.   Gastrointestinal: Negative for abdominal pain, diarrhea, nausea and vomiting.   Genitourinary: Negative for difficulty urinating, hematuria and vaginal bleeding.   Musculoskeletal: Negative for arthralgias.   Skin: Negative for rash.   Neurological: Negative for dizziness, weakness and headaches.   Psychiatric/Behavioral: Negative for agitation and sleep disturbance. The patient is not nervous/anxious.           Objective:      Vitals:    02/24/22 1305   BP: 132/70   Pulse: 80   Weight: 63 kg (139 lb)   Height: 5' 4" (1.626 m)     Physical Exam  Vitals and nursing note reviewed.   Constitutional:       Appearance: She is " well-developed. She is not ill-appearing.      Comments: cane   HENT:      Head: Normocephalic.      Right Ear: External ear normal.      Left Ear: External ear normal.   Neck:      Vascular: No JVD.   Cardiovascular:      Rate and Rhythm: Normal rate and regular rhythm.      Heart sounds: No murmur heard.  Pulmonary:      Effort: Pulmonary effort is normal.      Breath sounds: Normal breath sounds.   Abdominal:      General: Bowel sounds are normal.      Palpations: Abdomen is soft.   Musculoskeletal:         General: No deformity. Normal range of motion.      Cervical back: Normal range of motion and neck supple.   Lymphadenopathy:      Cervical: No cervical adenopathy.   Skin:     General: Skin is warm and dry.      Findings: No rash.   Neurological:      Mental Status: She is alert and oriented to person, place, and time.      Gait: Gait normal.   Psychiatric:         Speech: Speech normal.         Behavior: Behavior normal.           Assessment:       1. Gait abnormality    2. Pacemaker    3. History of bundle branch block    4. Coronary artery disease, unspecified vessel or lesion type, unspecified whether angina present, unspecified whether native or transplanted heart    5. High risk medication use    6. Neuropathy    7. Insomnia, unspecified type         Plan:       Gait abnormality    Pacemaker  -     CBC Auto Differential; Future; Expected date: 02/25/2022  -     Comprehensive Metabolic Panel; Future; Expected date: 02/25/2022  -     Lipid Panel; Future; Expected date: 02/25/2022  -     TSH w/reflex to FT4; Future; Expected date: 02/25/2022  -     Microalbumin/Creatinine Ratio, Urine; Future; Expected date: 02/25/2022  -     Urinalysis, Reflex to Urine Culture Urine, Clean Catch; Future; Expected date: 02/25/2022    History of bundle branch block  -     CBC Auto Differential; Future; Expected date: 02/25/2022  -     Comprehensive Metabolic Panel; Future; Expected date: 02/25/2022  -     Lipid Panel;  Future; Expected date: 02/25/2022  -     TSH w/reflex to FT4; Future; Expected date: 02/25/2022  -     Microalbumin/Creatinine Ratio, Urine; Future; Expected date: 02/25/2022  -     Urinalysis, Reflex to Urine Culture Urine, Clean Catch; Future; Expected date: 02/25/2022    Coronary artery disease, unspecified vessel or lesion type, unspecified whether angina present, unspecified whether native or transplanted heart  -     Lipid Panel; Future; Expected date: 02/25/2022    High risk medication use  -     CBC Auto Differential; Future; Expected date: 02/25/2022  -     Comprehensive Metabolic Panel; Future; Expected date: 02/25/2022  -     Lipid Panel; Future; Expected date: 02/25/2022  -     TSH w/reflex to FT4; Future; Expected date: 02/25/2022  -     Microalbumin/Creatinine Ratio, Urine; Future; Expected date: 02/25/2022  -     Urinalysis, Reflex to Urine Culture Urine, Clean Catch; Future; Expected date: 02/25/2022    Neuropathy  -     lecithin-isopropyl palm, bulk, (LIPMAX) Liqd; 3 Pump by Misc.(Non-Drug; Combo Route) route 4 (four) times daily.  Dispense: 120 mL; Refill: 0    Insomnia, unspecified type  -     zolpidem (AMBIEN) 5 MG Tab; Take 1/2 tablet nightly prn  Dispense: 45 tablet; Refill: 1      Follow up in about 6 months (around 8/24/2022), or if symptoms worsen or fail to improve, for medication management.        3/3/2022 Denisse Beltrán

## 2022-02-25 ENCOUNTER — TELEPHONE (OUTPATIENT)
Dept: FAMILY MEDICINE | Facility: CLINIC | Age: 87
End: 2022-02-25
Payer: MEDICARE

## 2022-02-25 RX ORDER — ZOLPIDEM TARTRATE 5 MG/1
TABLET ORAL
Qty: 45 TABLET | Refills: 1 | Status: SHIPPED | OUTPATIENT
Start: 2022-02-25 | End: 2023-06-27 | Stop reason: SDUPTHER

## 2022-02-25 RX ORDER — LECITHIN/ISOPROPYL PALMITATE
3 LIQUID (ML) MISCELLANEOUS 4 TIMES DAILY
Qty: 120 ML | Refills: 0 | Status: SHIPPED | OUTPATIENT
Start: 2022-02-25 | End: 2022-11-30 | Stop reason: SDUPTHER

## 2022-02-25 NOTE — TELEPHONE ENCOUNTER
----- Message from Imani Phelps sent at 2/25/2022 11:15 AM CST -----  Pt was in yesterday and an rx was supposed to be called in yesterday and it is not at her pharmacy. It is a [facility]   [facility] in [place]  [phone]

## 2022-03-02 LAB
ALBUMIN SERPL-MCNC: 3.8 G/DL (ref 3.6–5.1)
ALBUMIN/CREAT UR: 41 MCG/MG CREAT
ALBUMIN/GLOB SERPL: 1.6 (CALC) (ref 1–2.5)
ALP SERPL-CCNC: 59 U/L (ref 37–153)
ALT SERPL-CCNC: 10 U/L (ref 6–29)
APPEARANCE UR: ABNORMAL
AST SERPL-CCNC: 13 U/L (ref 10–35)
BACTERIA #/AREA URNS HPF: ABNORMAL /HPF
BACTERIA UR CULT: ABNORMAL
BACTERIA UR CULT: ABNORMAL
BASOPHILS # BLD AUTO: 31 CELLS/UL (ref 0–200)
BASOPHILS NFR BLD AUTO: 0.6 %
BILIRUB SERPL-MCNC: 0.7 MG/DL (ref 0.2–1.2)
BILIRUB UR QL STRIP: NEGATIVE
BUN SERPL-MCNC: 22 MG/DL (ref 7–25)
BUN/CREAT SERPL: 20 (CALC) (ref 6–22)
CALCIUM SERPL-MCNC: 9 MG/DL (ref 8.6–10.4)
CHLORIDE SERPL-SCNC: 99 MMOL/L (ref 98–110)
CHOLEST SERPL-MCNC: 138 MG/DL
CHOLEST/HDLC SERPL: 2.4 (CALC)
CO2 SERPL-SCNC: 28 MMOL/L (ref 20–32)
COLOR UR: YELLOW
CREAT SERPL-MCNC: 1.12 MG/DL (ref 0.6–0.88)
CREAT UR-MCNC: 29 MG/DL (ref 20–275)
EOSINOPHIL # BLD AUTO: 71 CELLS/UL (ref 15–500)
EOSINOPHIL NFR BLD AUTO: 1.4 %
ERYTHROCYTE [DISTWIDTH] IN BLOOD BY AUTOMATED COUNT: 12.9 % (ref 11–15)
GLOBULIN SER CALC-MCNC: 2.4 G/DL (CALC) (ref 1.9–3.7)
GLUCOSE SERPL-MCNC: 95 MG/DL (ref 65–99)
GLUCOSE UR QL STRIP: NEGATIVE
HCT VFR BLD AUTO: 36.7 % (ref 35–45)
HDLC SERPL-MCNC: 57 MG/DL
HGB BLD-MCNC: 12.4 G/DL (ref 11.7–15.5)
HGB UR QL STRIP: ABNORMAL
HYALINE CASTS #/AREA URNS LPF: ABNORMAL /LPF
KETONES UR QL STRIP: NEGATIVE
LDLC SERPL CALC-MCNC: 67 MG/DL (CALC)
LEUKOCYTE ESTERASE UR QL STRIP: ABNORMAL
LYMPHOCYTES # BLD AUTO: 1051 CELLS/UL (ref 850–3900)
LYMPHOCYTES NFR BLD AUTO: 20.6 %
MCH RBC QN AUTO: 30 PG (ref 27–33)
MCHC RBC AUTO-ENTMCNC: 33.8 G/DL (ref 32–36)
MCV RBC AUTO: 88.6 FL (ref 80–100)
MICROALBUMIN UR-MCNC: 1.2 MG/DL
MONOCYTES # BLD AUTO: 459 CELLS/UL (ref 200–950)
MONOCYTES NFR BLD AUTO: 9 %
NEUTROPHILS # BLD AUTO: 3488 CELLS/UL (ref 1500–7800)
NEUTROPHILS NFR BLD AUTO: 68.4 %
NITRITE UR QL STRIP: POSITIVE
NONHDLC SERPL-MCNC: 81 MG/DL (CALC)
PH UR STRIP: 7.5 [PH] (ref 5–8)
PLATELET # BLD AUTO: 209 THOUSAND/UL (ref 140–400)
PMV BLD REES-ECKER: 10.4 FL (ref 7.5–12.5)
POTASSIUM SERPL-SCNC: 4.2 MMOL/L (ref 3.5–5.3)
PROT SERPL-MCNC: 6.2 G/DL (ref 6.1–8.1)
PROT UR QL STRIP: NEGATIVE
RBC # BLD AUTO: 4.14 MILLION/UL (ref 3.8–5.1)
RBC #/AREA URNS HPF: ABNORMAL /HPF
SERVICE CMNT-IMP: ABNORMAL
SODIUM SERPL-SCNC: 134 MMOL/L (ref 135–146)
SP GR UR STRIP: 1.01 (ref 1–1.03)
SQUAMOUS #/AREA URNS HPF: ABNORMAL /HPF
TRIGL SERPL-MCNC: 65 MG/DL
TSH SERPL-ACNC: 2.05 MIU/L (ref 0.4–4.5)
WBC # BLD AUTO: 5.1 THOUSAND/UL (ref 3.8–10.8)
WBC #/AREA URNS HPF: ABNORMAL /HPF

## 2022-03-03 ENCOUNTER — TELEPHONE (OUTPATIENT)
Dept: FAMILY MEDICINE | Facility: CLINIC | Age: 87
End: 2022-03-03
Payer: MEDICARE

## 2022-03-03 NOTE — TELEPHONE ENCOUNTER
Spoke with pt and let her know the below per Denisse. States her dizziness is a little better, will call and keep us updated

## 2022-03-03 NOTE — TELEPHONE ENCOUNTER
Ok. If symptoms develop let us know. Rest of labs ok. Kidney function is slightly decreased but stable. How is dizziness?

## 2022-03-22 ENCOUNTER — TELEPHONE (OUTPATIENT)
Dept: CARDIOLOGY | Facility: CLINIC | Age: 87
End: 2022-03-22
Payer: MEDICARE

## 2022-03-22 DIAGNOSIS — Z95.0 PACEMAKER: Primary | ICD-10-CM

## 2022-03-22 DIAGNOSIS — I44.2 THIRD DEGREE AV BLOCK: ICD-10-CM

## 2022-03-22 NOTE — TELEPHONE ENCOUNTER
I s/w pt. She said it's not her ppm it's the monitor that's not working. She said she called [employer] and they told her they couldn't find her in the system. I enrolled her into [facility]. The monitor still not working has an orange light.  I told her that means not a good cellular signal. I told her to call [employer] to get it connected thru wifi. I told her we would call her back for an appointment to come in the office to get ppm checked.

## 2022-03-22 NOTE — TELEPHONE ENCOUNTER
----- Message from Izzy Deng MA sent at 3/22/2022 10:36 AM CDT -----  Contact: MARIAH CEDILLO [[id number]]  Type: Needs Medical Advice    Who Called: MARIAH CEDILLO [[id number]]  Best Call Back Number: [phone]  Inquiry/Question: Would you kindly call MARIAH CEDILLO [[id number]] regarding pacemaker concerns not properly functioning  Thank you~

## 2022-04-01 DIAGNOSIS — I44.2 THIRD DEGREE AV BLOCK: Primary | ICD-10-CM

## 2022-04-03 ENCOUNTER — HOSPITAL ENCOUNTER (EMERGENCY)
Facility: HOSPITAL | Age: 87
Discharge: HOME | End: 2022-04-03
Attending: EMERGENCY MEDICINE
Payer: MEDICARE

## 2022-04-03 VITALS
OXYGEN SATURATION: 95 % | RESPIRATION RATE: 12 BRPM | SYSTOLIC BLOOD PRESSURE: 178 MMHG | WEIGHT: 135 LBS | DIASTOLIC BLOOD PRESSURE: 79 MMHG | HEART RATE: 66 BPM | BODY MASS INDEX: 23.17 KG/M2 | TEMPERATURE: 98 F

## 2022-04-03 DIAGNOSIS — I10 HYPERTENSION: ICD-10-CM

## 2022-04-03 DIAGNOSIS — R07.9 CHEST PAIN: ICD-10-CM

## 2022-04-03 DIAGNOSIS — R51.9 ACUTE NONINTRACTABLE HEADACHE, UNSPECIFIED HEADACHE TYPE: Primary | ICD-10-CM

## 2022-04-03 LAB
ALBUMIN SERPL BCP-MCNC: 3.8 G/DL (ref 3.5–5.2)
ALP SERPL-CCNC: 52 U/L (ref 55–135)
ALT SERPL W/O P-5'-P-CCNC: 13 U/L (ref 10–44)
ANION GAP SERPL CALC-SCNC: 5 MMOL/L (ref 8–16)
AST SERPL-CCNC: 15 U/L (ref 10–40)
BASOPHILS # BLD AUTO: 0.02 K/UL (ref 0–0.2)
BASOPHILS NFR BLD: 0.3 % (ref 0–1.9)
BILIRUB SERPL-MCNC: 0.8 MG/DL (ref 0.1–1)
BNP SERPL-MCNC: 312 PG/ML (ref 0–99)
BUN SERPL-MCNC: 23 MG/DL (ref 8–23)
CALCIUM SERPL-MCNC: 8.9 MG/DL (ref 8.7–10.5)
CHLORIDE SERPL-SCNC: 103 MMOL/L (ref 95–110)
CO2 SERPL-SCNC: 27 MMOL/L (ref 23–29)
CREAT SERPL-MCNC: 1 MG/DL (ref 0.5–1.4)
DIFFERENTIAL METHOD: ABNORMAL
EOSINOPHIL # BLD AUTO: 0.1 K/UL (ref 0–0.5)
EOSINOPHIL NFR BLD: 1 % (ref 0–8)
ERYTHROCYTE [DISTWIDTH] IN BLOOD BY AUTOMATED COUNT: 13.2 % (ref 11.5–14.5)
EST. GFR  (AFRICAN AMERICAN): 58.1 ML/MIN/1.73 M^2
EST. GFR  (NON AFRICAN AMERICAN): 50.4 ML/MIN/1.73 M^2
GLUCOSE SERPL-MCNC: 105 MG/DL (ref 70–110)
HCT VFR BLD AUTO: 36.1 % (ref 37–48.5)
HGB BLD-MCNC: 12.1 G/DL (ref 12–16)
IMM GRANULOCYTES # BLD AUTO: 0.01 K/UL (ref 0–0.04)
IMM GRANULOCYTES NFR BLD AUTO: 0.2 % (ref 0–0.5)
LYMPHOCYTES # BLD AUTO: 1 K/UL (ref 1–4.8)
LYMPHOCYTES NFR BLD: 16.5 % (ref 18–48)
MCH RBC QN AUTO: 29.4 PG (ref 27–31)
MCHC RBC AUTO-ENTMCNC: 33.5 G/DL (ref 32–36)
MCV RBC AUTO: 88 FL (ref 82–98)
MONOCYTES # BLD AUTO: 0.5 K/UL (ref 0.3–1)
MONOCYTES NFR BLD: 7.9 % (ref 4–15)
NEUTROPHILS # BLD AUTO: 4.3 K/UL (ref 1.8–7.7)
NEUTROPHILS NFR BLD: 74.1 % (ref 38–73)
NRBC BLD-RTO: 0 /100 WBC
PLATELET # BLD AUTO: 190 K/UL (ref 150–450)
PMV BLD AUTO: 9.9 FL (ref 9.2–12.9)
POTASSIUM SERPL-SCNC: 4.4 MMOL/L (ref 3.5–5.1)
PROT SERPL-MCNC: 6.3 G/DL (ref 6–8.4)
RBC # BLD AUTO: 4.12 M/UL (ref 4–5.4)
SODIUM SERPL-SCNC: 135 MMOL/L (ref 136–145)
TROPONIN I SERPL DL<=0.01 NG/ML-MCNC: <0.03 NG/ML
TROPONIN I SERPL DL<=0.01 NG/ML-MCNC: <0.03 NG/ML
WBC # BLD AUTO: 5.83 K/UL (ref 3.9–12.7)

## 2022-04-03 PROCEDURE — 63600175 PHARM REV CODE 636 W HCPCS: Performed by: EMERGENCY MEDICINE

## 2022-04-03 PROCEDURE — 93005 ELECTROCARDIOGRAM TRACING: CPT | Performed by: GENERAL PRACTICE

## 2022-04-03 PROCEDURE — 80053 COMPREHEN METABOLIC PANEL: CPT | Performed by: EMERGENCY MEDICINE

## 2022-04-03 PROCEDURE — 96374 THER/PROPH/DIAG INJ IV PUSH: CPT

## 2022-04-03 PROCEDURE — 93010 EKG 12-LEAD: ICD-10-PCS | Mod: ,,, | Performed by: GENERAL PRACTICE

## 2022-04-03 PROCEDURE — 85025 COMPLETE CBC W/AUTO DIFF WBC: CPT | Performed by: EMERGENCY MEDICINE

## 2022-04-03 PROCEDURE — 99284 EMERGENCY DEPT VISIT MOD MDM: CPT | Mod: 25

## 2022-04-03 PROCEDURE — 25000003 PHARM REV CODE 250: Performed by: EMERGENCY MEDICINE

## 2022-04-03 PROCEDURE — 84484 ASSAY OF TROPONIN QUANT: CPT | Mod: 91 | Performed by: EMERGENCY MEDICINE

## 2022-04-03 PROCEDURE — 83880 ASSAY OF NATRIURETIC PEPTIDE: CPT | Performed by: EMERGENCY MEDICINE

## 2022-04-03 PROCEDURE — 93010 ELECTROCARDIOGRAM REPORT: CPT | Mod: ,,, | Performed by: GENERAL PRACTICE

## 2022-04-03 RX ORDER — ASPIRIN 325 MG
325 TABLET ORAL
Status: COMPLETED | OUTPATIENT
Start: 2022-04-03 | End: 2022-04-03

## 2022-04-03 RX ORDER — ACETAMINOPHEN 500 MG
1000 TABLET ORAL
Status: COMPLETED | OUTPATIENT
Start: 2022-04-03 | End: 2022-04-03

## 2022-04-03 RX ORDER — FUROSEMIDE 10 MG/ML
20 INJECTION INTRAMUSCULAR; INTRAVENOUS
Status: COMPLETED | OUTPATIENT
Start: 2022-04-03 | End: 2022-04-03

## 2022-04-03 RX ORDER — CLONIDINE HYDROCHLORIDE 0.1 MG/1
0.1 TABLET ORAL
Status: COMPLETED | OUTPATIENT
Start: 2022-04-03 | End: 2022-04-03

## 2022-04-03 RX ORDER — FUROSEMIDE 20 MG/1
20 TABLET ORAL DAILY
Qty: 10 TABLET | Refills: 0 | Status: SHIPPED | OUTPATIENT
Start: 2022-04-03 | End: 2022-04-06 | Stop reason: SDUPTHER

## 2022-04-03 RX ORDER — ACETAMINOPHEN 500 MG
500 TABLET ORAL
COMMUNITY

## 2022-04-03 RX ORDER — POTASSIUM CHLORIDE 750 MG/1
10 TABLET, EXTENDED RELEASE ORAL EVERY OTHER DAY
Qty: 5 TABLET | Refills: 0 | Status: SHIPPED | OUTPATIENT
Start: 2022-04-03 | End: 2022-04-06 | Stop reason: SDUPTHER

## 2022-04-03 RX ADMIN — FUROSEMIDE 20 MG: 10 INJECTION, SOLUTION INTRAMUSCULAR; INTRAVENOUS at 03:04

## 2022-04-03 RX ADMIN — CLONIDINE HYDROCHLORIDE 0.1 MG: 0.1 TABLET ORAL at 10:04

## 2022-04-03 RX ADMIN — ACETAMINOPHEN 1000 MG: 500 TABLET, FILM COATED ORAL at 02:04

## 2022-04-03 RX ADMIN — ASPIRIN 325 MG ORAL TABLET 325 MG: 325 PILL ORAL at 10:04

## 2022-04-03 NOTE — ED PROVIDER NOTES
"Encounter Date: 4/3/2022       History     Chief Complaint   Patient presents with    Hypertension     180 systolic at home x few days, taking home meds     Headache     This is an 88-year-old female who presents emergency department with concern for high blood pressure, headache and chest pinging.  Patient says that her blood pressures been high over the past couple days.  She normally does not check his is not sure what it is.  She is on losartan and she is on amlodipine.  She says that she recently took her losartan and cut it in half and took 1 in the morning and 1 in the evening.  She is post to do it once a day.  She is compliant with her medicine is however.  Blood pressures been running in the 180s range systolic she this.  She has had a headache off and on for the past couple days but worse last night.  Says that is located the back of her head, not the worst of her life, not sudden onset.  Is associated with some nausea.  No vomiting.  Not associated with any syncope or any vision changes or vision loss.  No trauma or injury.  No fall reported.  As far as the chest pain she said around 6:00 a.m. this morning she had a pinging" sensation in her chest that lasted seconds and then went away.  Has not returned.  She did not get any associated nausea or vomiting or diaphoresis.  Pain it was not describes a pressure or tightness.  No associated shortness of breath.  Pain is gone currently.        Review of patient's allergies indicates:   Allergen Reactions    Demerol [meperidine]      restless     Past Medical History:   Diagnosis Date    Back ache     Back pain     Carotid bruit     Cervical disc disease     Chest pain     Coronary artery disease     Diffuse cystic mastopathy     Elevated blood pressure     Encounter for blood transfusion     Foot pain, left     Generalized osteoarthrosis     Herpes zoster without complications     Hypertension     Neck pain     Neuropathy     " Osteoporosis     Palpitations     PONV (postoperative nausea and vomiting)     TIA (transient ischemic attack)      Past Surgical History:   Procedure Laterality Date    APPENDECTOMY      bladder and colon lift and retached      CARDIAC CATHETERIZATION      CATARACT EXTRACTION  02/23/2009    CHOLECYSTECTOMY      COLONOSCOPY Left 9/6/2021    Procedure: COLONOSCOPY;  Surgeon: Erlin Lainez MD;  Location: [facility] ENDO;  Service: Endoscopy;  Laterality: Left;    ESOPHAGOGASTRODUODENOSCOPY Left 9/6/2021    Procedure: EGD (ESOPHAGOGASTRODUODENOSCOPY);  Surgeon: Erlin Lainez MD;  Location: [facility] ENDO;  Service: Endoscopy;  Laterality: Left;    INSERTION OF PACEMAKER  12/2019    pt reported    TOTAL ABDOMINAL HYSTERECTOMY      TUBAL LIGATION       Family History   Problem Relation Age of Onset    Heart disease Father     Thyroid disease Father     Diabetes Father     Stroke Father     Diabetes Brother     Hypertension Brother     Hyperlipidemia Brother     Heart disease Brother     Cancer Brother         lung    Hypertension Sister     Diabetes Sister     Heart disease Sister     Hyperlipidemia Sister     Dementia Mother     Down syndrome Daughter     COPD Daughter      Social History     Tobacco Use    Smoking status: Never Smoker    Smokeless tobacco: Never Used   Substance Use Topics    Alcohol use: Not Currently     Comment: socially if ever     Review of Systems   Constitutional: Negative for chills and fever.   HENT: Negative for sore throat.    Respiratory: Negative for shortness of breath.    Cardiovascular: Positive for chest pain. Negative for palpitations.   Gastrointestinal: Negative for abdominal pain, nausea and vomiting.   Genitourinary: Negative for dysuria.   Musculoskeletal: Negative for back pain.   Skin: Negative for rash.   Neurological: Positive for headaches. Negative for syncope and weakness.   Hematological: Does not bruise/bleed easily.   All other systems  reviewed and are negative.      Physical Exam     Initial Vitals [04/03/22 0932]   BP Pulse Resp Temp SpO2   (!) 191/94 82 14 97.9 °F (36.6 °C) 97 %      MAP       --         Physical Exam    Nursing note and vitals reviewed.  Constitutional: She appears well-developed and well-nourished. No distress.   HENT:   Head: Normocephalic and atraumatic.   Mouth/Throat: No oropharyngeal exudate.   Eyes: Conjunctivae and EOM are normal. Pupils are equal, round, and reactive to light.   Neck: Neck supple. No tracheal deviation present.   Normal range of motion.  Cardiovascular: Normal rate, regular rhythm, normal heart sounds and intact distal pulses.   No murmur heard.  Pulmonary/Chest: Breath sounds normal. No stridor. No respiratory distress. She has no wheezes. She has no rhonchi. She has no rales.   Abdominal: Abdomen is soft. She exhibits no distension. There is no abdominal tenderness. There is no rebound and no guarding.   Musculoskeletal:         General: No tenderness or edema. Normal range of motion.      Cervical back: Normal range of motion and neck supple.     Neurological: She is alert and oriented to person, place, and time. She has normal strength. No cranial nerve deficit or sensory deficit.   Speech is normal, 5/5 strength in upper and lower extremities bilaterally.  Normal finger-to-nose.  Normal heel-to-shin.  No pronator drift.   Skin: Skin is warm and dry. Capillary refill takes less than 2 seconds. No rash noted. No erythema. No pallor.   Psychiatric: She has a normal mood and affect. Thought content normal.         ED Course   Procedures  Labs Reviewed   CBC W/ AUTO DIFFERENTIAL - Abnormal; Notable for the following components:       Result Value    Hematocrit 36.1 (*)     Gran % 74.1 (*)     Lymph % 16.5 (*)     All other components within normal limits   COMPREHENSIVE METABOLIC PANEL - Abnormal; Notable for the following components:    Sodium 135 (*)     Alkaline Phosphatase 52 (*)     Anion Gap 5  (*)     eGFR if  58.1 (*)     eGFR if non  50.4 (*)     All other components within normal limits   B-TYPE NATRIURETIC PEPTIDE - Abnormal; Notable for the following components:     (*)     All other components within normal limits   TROPONIN I   TROPONIN I        ECG Results          EKG 12-lead (In process)  Result time 04/03/22 10:35:16    In process by Interface, Lab In [facility] (04/03/22 10:35:16)                 Narrative:    Test Reason : I10,    Vent. Rate : 075 BPM     Atrial Rate : 075 BPM     P-R Int : 270 ms          QRS Dur : 148 ms      QT Int : 434 ms       P-R-T Axes : 055 -53 000 degrees     QTc Int : 484 ms    Sinus rhythm with 1st degree A-V block with occasional Premature  ventricular complexes  Right bundle branch block  Left anterior fascicular block   Bifascicular block   Abnormal ECG  When compared with ECG of 06-JAN-2022 13:50,  Premature ventricular complexes are now Present  Criteria for Septal infarct are no longer Present  T wave inversion less evident in Inferior leads    Referred By: [email or web address]   SELF           Confirmed By:                             Imaging Results          X-Ray Chest AP Portable (Final result)  Result time 04/03/22 11:36:31    Final result by Fidelia Piña MD (04/03/22 11:36:31)                 Narrative:    Portable chest x-ray at 11:21 AM is compared to a prior study dated 7/1/2016    Clinical history is chest pain    There is a left subclavian vein dual lead pacemaker with lead tips overlying the right atrium and right ventricle. Heart is enlarged. The lungs are clear. There are no acute osseous abnormalities.    IMPRESSION: Cardiomegaly with no acute pulmonary process    Electronically signed by:  Fidelia Piña MD  4/3/2022 11:36 AM CDT Workstation: [id number]                             CT Head Without Contrast (Final result)  Result time 04/03/22 11:15:07    Final result by Fidelia Piña MD (04/03/22  11:15:07)                 Narrative:    CMS MANDATED QUALITY DATA - CT RADIATION  436    All CT scans at this facility utilize dose modulation, iterative reconstruction, and/or weight based dosing when appropriate to reduce radiation dose to as low as reasonably achievable.  CT head without contrast    Clinical data: Headache    FINDINGS: Noninfusion images were obtained from the skull base to the vertex. There is no intracranial mass, hemorrhage, or midline shift. Ventricles and sulci are mildly prominent. There are no pathologic extra-axial fluid collections. There is no evidence of cortical ischemic change. Changes of mild chronic microvascular white matter disease are noted. Cerebellum and brainstem are normal. The calvarium is intact.    IMPRESSION:    1. No acute intracranial abnormalities. Chronic findings as discussed above.    Electronically signed by:  Fidelia Piña MD  4/3/2022 11:15 AM CDT Workstation: [id number]                            Results for orders placed or performed during the hospital encounter of 04/03/22   CBC auto differential   Result Value Ref Range    WBC 5.83 3.90 - 12.70 K/uL    RBC 4.12 4.00 - 5.40 M/uL    Hemoglobin 12.1 12.0 - 16.0 g/dL    Hematocrit 36.1 (L) 37.0 - 48.5 %    MCV 88 82 - 98 fL    MCH 29.4 27.0 - 31.0 pg    MCHC 33.5 32.0 - 36.0 g/dL    RDW 13.2 11.5 - 14.5 %    Platelets 190 150 - 450 K/uL    MPV 9.9 9.2 - 12.9 fL    Immature Granulocytes 0.2 0.0 - 0.5 %    Gran # (ANC) 4.3 1.8 - 7.7 K/uL    Immature Grans (Abs) 0.01 0.00 - 0.04 K/uL    Lymph # 1.0 1.0 - 4.8 K/uL    Mono # 0.5 0.3 - 1.0 K/uL    Eos # 0.1 0.0 - 0.5 K/uL    Baso # 0.02 0.00 - 0.20 K/uL    nRBC 0 0 /100 WBC    Gran % 74.1 (H) 38.0 - 73.0 %    Lymph % 16.5 (L) 18.0 - 48.0 %    Mono % 7.9 4.0 - 15.0 %    Eosinophil % 1.0 0.0 - 8.0 %    Basophil % 0.3 0.0 - 1.9 %    Differential Method Automated    Comprehensive metabolic panel   Result Value Ref Range    Sodium 135 (L) 136 - 145 mmol/L     Potassium 4.4 3.5 - 5.1 mmol/L    Chloride 103 95 - 110 mmol/L    CO2 27 23 - 29 mmol/L    Glucose 105 70 - 110 mg/dL    BUN 23 8 - 23 mg/dL    Creatinine 1.0 0.5 - 1.4 mg/dL    Calcium 8.9 8.7 - 10.5 mg/dL    Total Protein 6.3 6.0 - 8.4 g/dL    Albumin 3.8 3.5 - 5.2 g/dL    Total Bilirubin 0.8 0.1 - 1.0 mg/dL    Alkaline Phosphatase 52 (L) 55 - 135 U/L    AST 15 10 - 40 U/L    ALT 13 10 - 44 U/L    Anion Gap 5 (L) 8 - 16 mmol/L    eGFR if African American 58.1 (A) >60 mL/min/1.73 m^2    eGFR if non African American 50.4 (A) >60 mL/min/1.73 m^2   Troponin I #1   Result Value Ref Range    Troponin I <0.030 <=0.040 ng/mL   Troponin I #2   Result Value Ref Range    Troponin I <0.030 <=0.040 ng/mL   B-Type natriuretic peptide (BNP)   Result Value Ref Range     (H) 0 - 99 pg/mL     X-Ray Chest AP Portable   Final Result      CT Head Without Contrast   Final Result             Medications   aspirin tablet 325 mg (325 mg Oral Given 4/3/22 1052)   cloNIDine tablet 0.1 mg (0.1 mg Oral Given 4/3/22 1052)   acetaminophen tablet 1,000 mg (1,000 mg Oral Given 4/3/22 1407)   furosemide injection 20 mg (20 mg Intravenous Given 4/3/22 1503)     Medical Decision Making:   ED Management:  This is a 88-year-old female who presents emergency department with high blood pressure and headache.  Says her blood pressures been creeping up recently.  Upon discharge patient disclose that she had had her dose of amlodipine decreased within the last 2 months.  Patient states that she has had a headache for the last day or so off and on.  In the emergency department she has no focal neurological deficits.  Her CT scan did show any cranial hemorrhage, or any acute pathology.  Laboratory evaluation was entertained and was within normal limits.  Her EKG was chronically abnormal.  No acute changes noted.  Do not think that the patient spinning sensation she felt in her chest was related to acute coronary syndrome.  She has had 2-troponins in  the emergency department.  Has not as stated above EKG unchanged.  Patient mostly concerned with her blood pressure.  She has an appointment with her cardiologist in 2 days.  She does have a slight amount of peripheral edema on exam and BNP is slightly elevated cell placed on Lasix for the next several days until she is able to follow-up with Cardiology for further adjustment of blood pressure medication and recommendations regarding her blood pressure medications.  She and her daughter are comfortable with this plan and in agreement this plan.    I had a detailed discussion with the patient and/or guardian regarding: The historical points, exam findings, and diagnostic results supporting the discharge diagnosis, lab results, pertinent radiology results, and the need for outpatient follow-up, for definitive care with a family practitioner and to return to the emergency department if symptoms worsen or persist or if there are any questions or concerns that arise at home. All questions have been answered in detail. Strict return to Emergency Department precautions have been provided.    A dictation software program was used for this note.  Please expect some simple typographical  errors in this note.              ED Course as of 04/03/22 1716   Sun Apr 03, 2022   1146 EKG 9:31 a.m. normal sinus rhythm rate of 75, left axis deviation, right bundle branch block.  First degree AV block.  No STEMI.  EKG interpreted independently by me.  Occasional premature ventricular complexes. [JR]      ED Course User Index  [JR] David Sosa DO             Clinical Impression:   Final diagnoses:  [I10] Hypertension  [R07.9] Chest pain  [R51.9] Acute nonintractable headache, unspecified headache type (Primary)          ED Disposition Condition    Discharge Stable        ED Prescriptions     Medication Sig Dispense Start Date End Date Auth. Provider    furosemide (LASIX) 20 MG tablet Take 1 tablet (20 mg total) by mouth once daily.  for 10 days 10 tablet 4/3/2022 4/13/2022 David Sosa, DO    potassium chloride (KLOR-CON) 10 MEQ TbSR Take 1 tablet (10 mEq total) by mouth every other day. for 5 days 5 tablet 4/3/2022 4/8/2022 David Sosa DO        Follow-up Information     Follow up With Specialties Details Why Contact Info Additional Information    Count includes the [facility] - Emergency Dept Emergency Medicine  If symptoms worsen [address] [id number]  [phone] 1st floor    Jett Chinchilla MD Cardiovascular Disease, Cardiology In 2 days  [address]  [address]  [place] [address]  [phone]              David Sosa DO  04/03/22 7406

## 2022-04-03 NOTE — DISCHARGE INSTRUCTIONS
RETURN TO EMERGENCY DEPARTMENT WITHOUT FAIL, IF YOUR SYMPTOMS WORSEN, IF YOU GET NEW OR DIFFERENT SYMPTOMS, IF YOU ARE UNABLE TO FOLLOW UP AS DIRECTED, OR IF YOU HAVE ANY CONCERNS OR WORRIES.    Take Lasix for the next several days.  Monitor blood pressure at home.  Follow-up with her cardiologist as scheduled on Tuesday.

## 2022-04-05 ENCOUNTER — TELEPHONE (OUTPATIENT)
Dept: CARDIOLOGY | Facility: CLINIC | Age: 87
End: 2022-04-05
Payer: MEDICARE

## 2022-04-05 NOTE — TELEPHONE ENCOUNTER
----- Message from Halle Lenin sent at 4/5/2022  8:40 AM CDT -----  Regarding: same day appt  Contact: pt  Type:  Same Day Appointment Request    Caller is requesting a same day appointment.  Caller declined first available appointment listed below.      Name of Caller:  pt  When is the first available appointment?  N/a  Symptoms:  ED follow up/high BP  Best Call Back Number:  [phone]    Additional Information: was seen in the ED Sunday for high BP, asking to be seen by provider today. Please call

## 2022-04-06 ENCOUNTER — OFFICE VISIT (OUTPATIENT)
Dept: CARDIOLOGY | Facility: CLINIC | Age: 87
End: 2022-04-06
Payer: MEDICARE

## 2022-04-06 VITALS
HEIGHT: 64 IN | BODY MASS INDEX: 23.05 KG/M2 | RESPIRATION RATE: 16 BRPM | SYSTOLIC BLOOD PRESSURE: 130 MMHG | OXYGEN SATURATION: 92 % | WEIGHT: 135 LBS | DIASTOLIC BLOOD PRESSURE: 80 MMHG | HEART RATE: 87 BPM

## 2022-04-06 DIAGNOSIS — R60.0 LOCALIZED EDEMA: ICD-10-CM

## 2022-04-06 DIAGNOSIS — R94.31 ABNORMAL EKG: ICD-10-CM

## 2022-04-06 DIAGNOSIS — D64.9 ANEMIA, UNSPECIFIED TYPE: ICD-10-CM

## 2022-04-06 DIAGNOSIS — I10 ESSENTIAL (PRIMARY) HYPERTENSION: Primary | ICD-10-CM

## 2022-04-06 DIAGNOSIS — R26.89 IMBALANCE: ICD-10-CM

## 2022-04-06 DIAGNOSIS — I44.2 THIRD DEGREE AV BLOCK: ICD-10-CM

## 2022-04-06 DIAGNOSIS — Z95.0 CARDIAC PACEMAKER IN SITU: ICD-10-CM

## 2022-04-06 DIAGNOSIS — I45.2 RBBB (RIGHT BUNDLE BRANCH BLOCK WITH LEFT ANTERIOR FASCICULAR BLOCK): ICD-10-CM

## 2022-04-06 PROCEDURE — 99213 OFFICE O/P EST LOW 20 MIN: CPT | Mod: S$GLB,,, | Performed by: GENERAL PRACTICE

## 2022-04-06 PROCEDURE — 99213 PR OFFICE/OUTPT VISIT, EST, LEVL III, 20-29 MIN: ICD-10-PCS | Mod: S$GLB,,, | Performed by: GENERAL PRACTICE

## 2022-04-06 RX ORDER — HYDRALAZINE HYDROCHLORIDE 10 MG/1
10 TABLET, FILM COATED ORAL DAILY PRN
COMMUNITY
End: 2022-04-06 | Stop reason: SDUPTHER

## 2022-04-06 RX ORDER — AMLODIPINE BESYLATE 2.5 MG/1
2.5 TABLET ORAL 2 TIMES DAILY
Qty: 90 TABLET | Refills: 3 | Status: SHIPPED | OUTPATIENT
Start: 2022-04-06 | End: 2022-10-12

## 2022-04-06 RX ORDER — FUROSEMIDE 20 MG/1
20 TABLET ORAL DAILY
Qty: 30 TABLET | Refills: 3 | Status: SHIPPED | OUTPATIENT
Start: 2022-04-06 | End: 2023-05-29

## 2022-04-06 RX ORDER — HYDRALAZINE HYDROCHLORIDE 10 MG/1
10 TABLET, FILM COATED ORAL DAILY PRN
Qty: 30 TABLET | Refills: 11 | Status: SHIPPED | OUTPATIENT
Start: 2022-04-06

## 2022-04-06 RX ORDER — POTASSIUM CHLORIDE 750 MG/1
10 TABLET, EXTENDED RELEASE ORAL DAILY
Qty: 30 TABLET | Refills: 11 | Status: SHIPPED | OUTPATIENT
Start: 2022-04-06 | End: 2022-05-06

## 2022-04-06 NOTE — PROGRESS NOTES
Subjective:    Patient ID:  Vee Cadena is a 88 y.o. female who presents for follow-up of   Chief Complaint   Patient presents with    Hypertension    Headache    Hospital Follow Up       HPI:  Vee Cadena is a 87 y.o. old female who  has a past medical history of Back ache, Back pain, Carotid bruit, Cervical disc disease, Chest pain, Coronary artery disease, Diffuse cystic mastopathy, Elevated blood pressure, Encounter for blood transfusion, Foot pain, left, Generalized osteoarthrosis, Herpes zoster without complications, Hypertension, Neck pain, Neuropathy, Osteoporosis, Palpitations, PONV (postoperative nausea and vomiting), and TIA (transient ischemic attack)..  WAS SEEN BY DR COX IN HOSPITAL 9/5/21  Patient admitted with complaints of bloody stools, bright red blood per rectum.  She has history of pacer implant.  Denies chest pain shortness of breath no PND orthopnea no cough hemoptysis.  Patient has history of left heart catheterization but no reported history of intervention.  Risk factors are positive for family history, hypertension dyslipidemia.  Cardiac hemodynamics otherwise stable.  Telemetry stable.  No supraventricular ventricular arrhythmia.  Pacer function appears to be normal.  ECHO :  · The estimated PA systolic pressure is 46 mmHg.  · The left ventricle is normal in size with mild concentric hypertrophy and normal systolic function.  · The estimated ejection fraction is 65%.  · Normal left ventricular diastolic function.  · Normal right ventricular size with normal right ventricular systolic function.  · Mild left atrial enlargement.  · Mild tricuspid regurgitation.  · Mild-to-moderate mitral regurgitation.  ·    2016:     Impression: NORMAL MYOCARDIAL PERFUSION   1. The perfusion scan is free of evidence for myocardial ischemia or injury.   2. Resting wall motion is physiologic.   3. Resting LV function is normal.   4. The ventricular volumes are normal at rest and stress.          SHE REPORTS THAT ABOUT 2 TIMES YEAR SHOW GETS IN HIS DISCOMFORT THIS STARTS IN HER MID CHEST AND GOES UP TO BOTH SIDES OF HER JAW.  IT DOES NOT LAST VERY LONG AS LONG SHE TAKES TUMS IT WILL GO AWAY    She is currently doing very well.  Her last pacemaker check was September by [employer].  She should be due flu another pacemaker check and approximately March.  She is taking hydralazine 10 mg once or twice since last visit for spike in blood pressure.  She has not having any significant symptoms.  I recommend continue the same medications for now notify us if she becomes dizzy or lightheaded and we can adjust her medications.  Continue to remain active.  I would recommend she continue to hold the aspirin.  Will stop the iron and recheck the blood count..       4/3    This is a 88-year-old female who presents emergency department with high blood pressure and headache.  Says her blood pressures been creeping up recently.  Upon discharge patient disclose that she had had her dose of amlodipine decreased within the last 2 months.  Patient states that she has had a headache for the last day or so off and on.  In the emergency department she has no focal neurological deficits.  Her CT scan did show any cranial hemorrhage, or any acute pathology.  Laboratory evaluation was entertained and was within normal limits.  Her EKG was chronically abnormal.  No acute changes noted.  Do not think that the patient spinning sensation she felt in her chest was related to acute coronary syndrome.  She has had 2-troponins in the emergency department.  Has not as stated above EKG unchanged.  Patient mostly concerned with her blood pressure.  She has an appointment with her cardiologist in 2 days.  She does have a slight amount of peripheral edema on exam and BNP is slightly elevated cell placed on Lasix for the next several days until she is able to follow-up with Cardiology for further adjustment of blood pressure medication and  recommendations regarding her blood pressure medications.  She and her daughter are comfortable with this plan and in agreement this plan.    Initial Vitals [04/03/22 0932]   BP Pulse Resp Temp SpO2   (!) 191/94 82 14 97.9 °F (36.6 °C) 97 %         4/6/22  SHE IS HERE FOR OF FOLLOW-UP VISIT.  She was given Lasix for 10 days and potassium.  Her blood pressure yesterday was running 156 over 81. She has a new blood pressure cuff from [employer].  The lower extremity edema is got a little bit better.          Review of patient's allergies indicates:   Allergen Reactions    Demerol [meperidine]      restless       Past Medical History:   Diagnosis Date    Back ache     Back pain     Carotid bruit     Cervical disc disease     Chest pain     Coronary artery disease     Diffuse cystic mastopathy     Elevated blood pressure     Encounter for blood transfusion     Foot pain, left     Generalized osteoarthrosis     Herpes zoster without complications     Hypertension     Neck pain     Neuropathy     Osteoporosis     Palpitations     PONV (postoperative nausea and vomiting)     TIA (transient ischemic attack)      Past Surgical History:   Procedure Laterality Date    APPENDECTOMY      bladder and colon lift and retached      CARDIAC CATHETERIZATION      CATARACT EXTRACTION  02/23/2009    CHOLECYSTECTOMY      COLONOSCOPY Left 9/6/2021    Procedure: COLONOSCOPY;  Surgeon: Erlin Lainez MD;  Location: [facility];  Service: Endoscopy;  Laterality: Left;    ESOPHAGOGASTRODUODENOSCOPY Left 9/6/2021    Procedure: EGD (ESOPHAGOGASTRODUODENOSCOPY);  Surgeon: Erlin Lainez MD;  Location: [facility];  Service: Endoscopy;  Laterality: Left;    INSERTION OF PACEMAKER  12/2019    pt reported    TOTAL ABDOMINAL HYSTERECTOMY      TUBAL LIGATION       Social History     Tobacco Use    Smoking status: Never Smoker    Smokeless tobacco: Never Used   Substance Use Topics    Alcohol use: Not Currently      Comment: socially if ever     Family History   Problem Relation Age of Onset    Heart disease Father     Thyroid disease Father     Diabetes Father     Stroke Father     Diabetes Brother     Hypertension Brother     Hyperlipidemia Brother     Heart disease Brother     Cancer Brother         lung    Hypertension Sister     Diabetes Sister     Heart disease Sister     Hyperlipidemia Sister     Dementia Mother     Down syndrome Daughter     COPD Daughter         Review of Systems:   Constitution: Negative for diaphoresis and fever.   HEENT: Negative for nosebleeds.    Cardiovascular: Negative for chest pain       No dyspnea on exertion       No leg swelling        No palpitations  Respiratory: Negative for shortness of breath and wheezing.    Hematologic/Lymphatic: Negative for bleeding problem. Does not bruise/bleed easily.   Skin: Negative for color change and rash.   Musculoskeletal: Negative for falls and myalgias.   Gastrointestinal: Negative for hematemesis and hematochezia.   Genitourinary: Negative for hematuria.   Neurological: Negative for dizziness and light-headedness.   Psychiatric/Behavioral: Negative for altered mental status and memory loss.          Objective:        Vitals:    04/06/22 1423   BP: 130/80   Pulse: 87   Resp: 16       Lab Results   Component Value Date    WBC 5.83 04/03/2022    HGB 12.1 04/03/2022    HCT 36.1 (L) 04/03/2022     04/03/2022    CHOL 138 02/28/2022    TRIG 65 02/28/2022    HDL 57 02/28/2022    ALT 13 04/03/2022    AST 15 04/03/2022     (L) 04/03/2022    K 4.4 04/03/2022     04/03/2022    CREATININE 1.0 04/03/2022    BUN 23 04/03/2022    CO2 27 04/03/2022    TSH 0.993 07/01/2016    INR 1.1 09/05/2021    HGBA1C 5.6 07/01/2016    MICROALBUR 1.2 02/28/2022        ECHOCARDIOGRAM RESULTS  Results for orders placed during the hospital encounter of 09/05/21    Echo Saline Bubble? No    Interpretation Summary  · The estimated PA systolic pressure  is 46 mmHg.  · The left ventricle is normal in size with mild concentric hypertrophy and normal systolic function.  · The estimated ejection fraction is 65%.  · Normal left ventricular diastolic function.  · Normal right ventricular size with normal right ventricular systolic function.  · Mild left atrial enlargement.  · Mild tricuspid regurgitation.  · Mild-to-moderate mitral regurgitation.        CURRENT/PREVIOUS VISIT EKG  Results for orders placed or performed during the hospital encounter of 04/03/22   EKG 12-lead    Collection Time: 04/03/22  9:31 AM    Narrative    Test Reason : I10,    Vent. Rate : 075 BPM     Atrial Rate : 075 BPM     P-R Int : 270 ms          QRS Dur : 148 ms      QT Int : 434 ms       P-R-T Axes : 055 -53 000 degrees     QTc Int : 484 ms    Sinus rhythm with 1st degree A-V block with occasional Premature  ventricular complexes  Right bundle branch block  Left anterior fascicular block   Bifascicular block   Abnormal ECG  When compared with ECG of 06-JAN-2022 13:50,  Premature ventricular complexes are now Present  Criteria for Septal infarct are no longer Present  T wave inversion less evident in Inferior leads    Referred By: [email or web address]   SELF           Confirmed By:      No valid procedures specified.   No results found for this or any previous visit.      Physical Exam:  CONSTITUTIONAL: No fever, no chills  HEENT: Normocephalic, atraumatic,pupils reactive to light                 NECK:  No JVD no carotid bruit  CVS: S1S2+, RRR, no murmurs,   LUNGS: Clear  ABDOMEN: Soft, NT, BS+  EXTREMITIES: No cyanosis, edema  : No alvarado catheter  NEURO: AAO X 3  PSY: Normal affect      Medication List with Changes/Refills   Current Medications    ACETAMINOPHEN (TYLENOL EXTRA STRENGTH) 500 MG TABLET    Take 500 mg by mouth as needed for Pain.    AMLODIPINE (NORVASC) 2.5 MG TABLET    Take 1 tablet (2.5 mg total) by mouth every evening.    CHOLECALCIFEROL, VITAMIN D3, (VITAMIN D3) 25 MCG (1,000 UNIT)  CAPSULE    Take 1,000 Units by mouth once daily.    CYANOCOBALAMIN 500 MCG TABLET    Take 500 mcg by mouth once daily.    FUROSEMIDE (LASIX) 20 MG TABLET    Take 1 tablet (20 mg total) by mouth once daily. for 10 days    HYDRALAZINE (APRESOLINE) 10 MG TABLET    Take 10 mg by mouth daily as needed (TAKE 1 TAB PO PRN EVERY 2 HOURS FOR SBP>180 OR DBP>100).    LECITHIN-ISOPROPYL PALM, BULK, (LIPMAX) LIQD    3 Pump by Misc.(Non-Drug; Combo Route) route 4 (four) times daily.    LOSARTAN (COZAAR) 50 MG TABLET    Take 1 tablet (50 mg total) by mouth every evening.    POTASSIUM CHLORIDE (KLOR-CON) 10 MEQ TBSR    Take 1 tablet (10 mEq total) by mouth every other day. for 5 days    TRIAMCINOLONE ACETONIDE 0.1% (KENALOG) 0.1 % CREAM    Apply topically 2 (two) times daily.    UNABLE TO FIND    Apply 3 Pump topically 5 (five) times daily. Ketoprofen/Cyclobenzaprine/Lidocaine Hcl (lipomax) 10/2/5%   APPLY UP TO 4.8 GRAMS (3 PUMPS) TO PAINFUL AREAS UP TO FIVE TIMES DAILY. RUB IN WELL    VITAMIN E 400 UNIT CAPSULE    Take 400 Units by mouth once daily.    ZOLPIDEM (AMBIEN) 5 MG TAB    Take 1/2 tablet nightly prn             Assessment:       1. Essential (primary) hypertension     2. Anemia, unspecified type    3. Abnormal EKG    4. Third degree AV block    5. Cardiac pacemaker in situ    6. RBBB (right bundle branch block with left anterior fascicular block)    7. Imbalance    8. Localized edema         Plan:     Problem List Items Addressed This Visit    None     Visit Diagnoses     Essential (primary) hypertension     -  Primary    Anemia, unspecified type        Abnormal EKG        Third degree AV block        Cardiac pacemaker in situ        RBBB (right bundle branch block with left anterior fascicular block)        Imbalance        Localized edema            Patient a main problem is with her blood pressure I do recommend a home of monitoring her blood pressure additional monitoring program is she is able.  Her readings at  home appear to be higher than what we got today 130/80.  At home she is getting 150-160.   So we need to verify that her cuff is accurate and check twice a day for the next 2 weeks.  Lasix seems to help her edema in her lower extremities and the amlodipine could work against that so will continue the Lasix and potassium daily.  As her blood pressure continues to run high on her readings and she is symptomatic will increase the amlodipine to 2.5 mg b.i.d. and continue the losartan in the morning.  Repeat blood work in 3 months to check the renal function and potassium.  She reports some dizziness does not appear to be related to the blood pressure so will watch for orthostasis and continue to follow orthostatic precautions and walk with a cane    Follow up in about 3 months (around 7/6/2022).    The patients questions were answered, they verbalized understanding, and agreed with the treatment plan.     TOI JOHNSTON MD  [facility]

## 2022-04-11 ENCOUNTER — HISTORICAL (OUTPATIENT)
Dept: ADMINISTRATIVE | Facility: HOSPITAL | Age: 87
End: 2022-04-11
Payer: MEDICARE

## 2022-04-12 ENCOUNTER — PATIENT MESSAGE (OUTPATIENT)
Dept: CARDIOLOGY | Facility: CLINIC | Age: 87
End: 2022-04-12
Payer: MEDICARE

## 2022-04-18 ENCOUNTER — TELEPHONE (OUTPATIENT)
Dept: FAMILY MEDICINE | Facility: CLINIC | Age: 87
End: 2022-04-18

## 2022-04-18 ENCOUNTER — TELEPHONE (OUTPATIENT)
Dept: CARDIOLOGY | Facility: CLINIC | Age: 87
End: 2022-04-18
Payer: MEDICARE

## 2022-04-18 NOTE — TELEPHONE ENCOUNTER
Spoke with pt about message sent. Pt wanted us to send her cardio Dr. Srini Chinchilla our  most recent blood work. Verbalized that Dr. Chinchilla is part of [facility], so he is able to take a look at her most recent blood work from our clinic. Pt acknowledge understanding.

## 2022-04-18 NOTE — TELEPHONE ENCOUNTER
----- Message from Troy Diamond sent at 4/18/2022 10:19 AM CDT -----  Contact: pt  Has questions about bloodwork what baker needs just got labs done with PCP and seeing if that is good enough please call back pt asap, on after visit notes      [phone]

## 2022-04-18 NOTE — TELEPHONE ENCOUNTER
----- Message from Carolann Zuñiga sent at 4/18/2022 10:08 AM CDT -----  Pt calling would like her most recent labs sent to cardio Srini Chinchilla said he is with [facility] but wants to be sure he can access them.  # [phone]

## 2022-04-26 ENCOUNTER — TELEPHONE (OUTPATIENT)
Dept: CARDIOLOGY | Facility: CLINIC | Age: 87
End: 2022-04-26
Payer: MEDICARE

## 2022-04-26 ENCOUNTER — PATIENT MESSAGE (OUTPATIENT)
Dept: CARDIOLOGY | Facility: CLINIC | Age: 87
End: 2022-04-26
Payer: MEDICARE

## 2022-04-26 NOTE — TELEPHONE ENCOUNTER
Spoke with patient after portal message. Patient just wanted to give update B/p readings and also let us know when patient was taking her medications. Patient stated this morning reading was 144/72 and will continue to record am and pm readings.

## 2022-04-29 VITALS
HEIGHT: 64 IN | DIASTOLIC BLOOD PRESSURE: 52 MMHG | OXYGEN SATURATION: 93 % | SYSTOLIC BLOOD PRESSURE: 93 MMHG | WEIGHT: 150 LBS | BODY MASS INDEX: 25.61 KG/M2

## 2022-07-07 ENCOUNTER — LAB VISIT (OUTPATIENT)
Dept: LAB | Facility: HOSPITAL | Age: 87
End: 2022-07-07
Attending: GENERAL PRACTICE
Payer: MEDICARE

## 2022-07-07 DIAGNOSIS — G62.9 NEUROPATHY: ICD-10-CM

## 2022-07-07 DIAGNOSIS — Z95.0 CARDIAC PACEMAKER IN SITU: ICD-10-CM

## 2022-07-07 DIAGNOSIS — I44.2 THIRD DEGREE AV BLOCK: ICD-10-CM

## 2022-07-07 DIAGNOSIS — I10 ESSENTIAL (PRIMARY) HYPERTENSION: ICD-10-CM

## 2022-07-07 DIAGNOSIS — I44.0 FIRST DEGREE AV BLOCK: ICD-10-CM

## 2022-07-07 DIAGNOSIS — R94.31 ABNORMAL EKG: ICD-10-CM

## 2022-07-07 DIAGNOSIS — I45.2 RBBB (RIGHT BUNDLE BRANCH BLOCK WITH LEFT ANTERIOR FASCICULAR BLOCK): ICD-10-CM

## 2022-07-07 DIAGNOSIS — R60.0 LOCALIZED EDEMA: ICD-10-CM

## 2022-07-07 DIAGNOSIS — I95.1 ORTHOSTASIS: ICD-10-CM

## 2022-07-07 DIAGNOSIS — Z95.0 PACEMAKER: ICD-10-CM

## 2022-07-07 DIAGNOSIS — D64.9 ANEMIA, UNSPECIFIED TYPE: ICD-10-CM

## 2022-07-07 LAB
ALBUMIN SERPL BCP-MCNC: 3.9 G/DL (ref 3.5–5.2)
ALP SERPL-CCNC: 57 U/L (ref 55–135)
ALT SERPL W/O P-5'-P-CCNC: 14 U/L (ref 10–44)
ANION GAP SERPL CALC-SCNC: 7 MMOL/L (ref 8–16)
AST SERPL-CCNC: 18 U/L (ref 10–40)
BILIRUB SERPL-MCNC: 0.9 MG/DL (ref 0.1–1)
BUN SERPL-MCNC: 22 MG/DL (ref 8–23)
CALCIUM SERPL-MCNC: 9 MG/DL (ref 8.7–10.5)
CHLORIDE SERPL-SCNC: 101 MMOL/L (ref 95–110)
CHOLEST SERPL-MCNC: 137 MG/DL (ref 120–199)
CHOLEST/HDLC SERPL: 2 {RATIO} (ref 2–5)
CO2 SERPL-SCNC: 29 MMOL/L (ref 23–29)
CREAT SERPL-MCNC: 1.2 MG/DL (ref 0.5–1.4)
ERYTHROCYTE [DISTWIDTH] IN BLOOD BY AUTOMATED COUNT: 13.5 % (ref 11.5–14.5)
EST. GFR  (AFRICAN AMERICAN): 46.6 ML/MIN/1.73 M^2
EST. GFR  (NON AFRICAN AMERICAN): 40.4 ML/MIN/1.73 M^2
GLUCOSE SERPL-MCNC: 104 MG/DL (ref 70–110)
HCT VFR BLD AUTO: 36.7 % (ref 37–48.5)
HDLC SERPL-MCNC: 68 MG/DL (ref 40–75)
HDLC SERPL: 49.6 % (ref 20–50)
HGB BLD-MCNC: 12.2 G/DL (ref 12–16)
LDLC SERPL CALC-MCNC: 62.8 MG/DL (ref 63–159)
MCH RBC QN AUTO: 29.6 PG (ref 27–31)
MCHC RBC AUTO-ENTMCNC: 33.2 G/DL (ref 32–36)
MCV RBC AUTO: 89 FL (ref 82–98)
NONHDLC SERPL-MCNC: 69 MG/DL
PLATELET # BLD AUTO: 199 K/UL (ref 150–450)
PMV BLD AUTO: 9.4 FL (ref 9.2–12.9)
POTASSIUM SERPL-SCNC: 4.2 MMOL/L (ref 3.5–5.1)
PROT SERPL-MCNC: 6.7 G/DL (ref 6–8.4)
RBC # BLD AUTO: 4.12 M/UL (ref 4–5.4)
SODIUM SERPL-SCNC: 137 MMOL/L (ref 136–145)
TRIGL SERPL-MCNC: 31 MG/DL (ref 30–150)
WBC # BLD AUTO: 4.07 K/UL (ref 3.9–12.7)

## 2022-07-07 PROCEDURE — 80053 COMPREHEN METABOLIC PANEL: CPT | Performed by: GENERAL PRACTICE

## 2022-07-07 PROCEDURE — 80061 LIPID PANEL: CPT | Performed by: GENERAL PRACTICE

## 2022-07-07 PROCEDURE — 36415 COLL VENOUS BLD VENIPUNCTURE: CPT | Performed by: GENERAL PRACTICE

## 2022-07-07 PROCEDURE — 85027 COMPLETE CBC AUTOMATED: CPT | Performed by: GENERAL PRACTICE

## 2022-07-13 ENCOUNTER — OFFICE VISIT (OUTPATIENT)
Dept: CARDIOLOGY | Facility: CLINIC | Age: 87
End: 2022-07-13
Payer: MEDICARE

## 2022-07-13 VITALS
SYSTOLIC BLOOD PRESSURE: 126 MMHG | WEIGHT: 138 LBS | BODY MASS INDEX: 23.69 KG/M2 | HEART RATE: 76 BPM | OXYGEN SATURATION: 96 % | DIASTOLIC BLOOD PRESSURE: 66 MMHG

## 2022-07-13 DIAGNOSIS — I45.2 RBBB (RIGHT BUNDLE BRANCH BLOCK WITH LEFT ANTERIOR FASCICULAR BLOCK): ICD-10-CM

## 2022-07-13 DIAGNOSIS — I44.0 FIRST DEGREE AV BLOCK: ICD-10-CM

## 2022-07-13 DIAGNOSIS — R06.02 SOB (SHORTNESS OF BREATH): ICD-10-CM

## 2022-07-13 DIAGNOSIS — R60.0 LOCALIZED EDEMA: ICD-10-CM

## 2022-07-13 DIAGNOSIS — I27.20 HYPERTENSIVE PULMONARY VASCULAR DISEASE: ICD-10-CM

## 2022-07-13 DIAGNOSIS — Z95.0 CARDIAC PACEMAKER IN SITU: ICD-10-CM

## 2022-07-13 DIAGNOSIS — D64.9 ANEMIA, UNSPECIFIED TYPE: ICD-10-CM

## 2022-07-13 DIAGNOSIS — I10 ESSENTIAL (PRIMARY) HYPERTENSION: Primary | ICD-10-CM

## 2022-07-13 DIAGNOSIS — I25.10 CORONARY ARTERY DISEASE INVOLVING NATIVE CORONARY ARTERY OF NATIVE HEART WITHOUT ANGINA PECTORIS: ICD-10-CM

## 2022-07-13 PROCEDURE — 93000 ELECTROCARDIOGRAM COMPLETE: CPT | Mod: S$GLB,,, | Performed by: GENERAL PRACTICE

## 2022-07-13 PROCEDURE — 99213 PR OFFICE/OUTPT VISIT, EST, LEVL III, 20-29 MIN: ICD-10-PCS | Mod: S$GLB,,, | Performed by: GENERAL PRACTICE

## 2022-07-13 PROCEDURE — 93000 EKG 12-LEAD: ICD-10-PCS | Mod: S$GLB,,, | Performed by: GENERAL PRACTICE

## 2022-07-13 PROCEDURE — 99213 OFFICE O/P EST LOW 20 MIN: CPT | Mod: S$GLB,,, | Performed by: GENERAL PRACTICE

## 2022-07-13 NOTE — PROGRESS NOTES
Subjective:    Patient ID:  Vee Cadena is a 88 y.o. female who presents for follow-up of No chief complaint on file.      HPI:  Vee Cadena is a 87 y.o. old female who  has a past medical history of Back ache, Back pain, Carotid bruit, Cervical disc disease, Chest pain, Coronary artery disease, Diffuse cystic mastopathy, Elevated blood pressure, Encounter for blood transfusion, Foot pain, left, Generalized osteoarthrosis, Herpes zoster without complications, Hypertension, Neck pain, Neuropathy, Osteoporosis, Palpitations, PONV (postoperative nausea and vomiting), and TIA (transient ischemic attack)..  WAS SEEN BY DR COX IN HOSPITAL 9/5/21  Patient admitted with complaints of bloody stools, bright red blood per rectum.  She has history of pacer implant.  Denies chest pain shortness of breath no PND orthopnea no cough hemoptysis.  Patient has history of left heart catheterization but no reported history of intervention.  Risk factors are positive for family history, hypertension dyslipidemia.  Cardiac hemodynamics otherwise stable.  Telemetry stable.  No supraventricular ventricular arrhythmia.  Pacer function appears to be normal.  ECHO :  · The estimated PA systolic pressure is 46 mmHg.  · The left ventricle is normal in size with mild concentric hypertrophy and normal systolic function.  · The estimated ejection fraction is 65%.  · Normal left ventricular diastolic function.  · Normal right ventricular size with normal right ventricular systolic function.  · Mild left atrial enlargement.  · Mild tricuspid regurgitation.  · Mild-to-moderate mitral regurgitation.  ·    2016:     Impression: NORMAL MYOCARDIAL PERFUSION   1. The perfusion scan is free of evidence for myocardial ischemia or injury.   2. Resting wall motion is physiologic.   3. Resting LV function is normal.   4. The ventricular volumes are normal at rest and stress.         SHE REPORTS THAT ABOUT 2 TIMES YEAR SHOW GETS IN HIS DISCOMFORT THIS  STARTS IN HER MID CHEST AND GOES UP TO BOTH SIDES OF HER JAW.  IT DOES NOT LAST VERY LONG AS LONG SHE TAKES TUMS IT WILL GO AWAY     She is currently doing very well.  Her last pacemaker check was September by [employer].  She should be due flu another pacemaker check and approximately March.  She is taking hydralazine 10 mg once or twice since last visit for spike in blood pressure.  She has not having any significant symptoms.  I recommend continue the same medications for now notify us if she becomes dizzy or lightheaded and we can adjust her medications.  Continue to remain active.  I would recommend she continue to hold the aspirin.  Will stop the iron and recheck the blood count..        4/3     This is a 88-year-old female who presents emergency department with high blood pressure and headache.  Says her blood pressures been creeping up recently.  Upon discharge patient disclose that she had had her dose of amlodipine decreased within the last 2 months.  Patient states that she has had a headache for the last day or so off and on.  In the emergency department she has no focal neurological deficits.  Her CT scan did show any cranial hemorrhage, or any acute pathology.  Laboratory evaluation was entertained and was within normal limits.  Her EKG was chronically abnormal.  No acute changes noted.  Do not think that the patient spinning sensation she felt in her chest was related to acute coronary syndrome.  She has had 2-troponins in the emergency department.  Has not as stated above EKG unchanged.  Patient mostly concerned with her blood pressure.  She has an appointment with her cardiologist in 2 days.  She does have a slight amount of peripheral edema on exam and BNP is slightly elevated cell placed on Lasix for the next several days until she is able to follow-up with Cardiology for further adjustment of blood pressure medication and recommendations regarding her blood pressure medications.  She and her  daughter are comfortable with this plan and in agreement this plan.   Patient a main problem is with her blood pressure I do recommend a home of monitoring her blood pressure additional monitoring program is she is able.  Her readings at home appear to be higher than what we got today 130/80.  At home she is getting 150-160.   So we need to verify that her cuff is accurate and check twice a day for the next 2 weeks.  Lasix seems to help her edema in her lower extremities and the amlodipine could work against that so will continue the Lasix and potassium daily.  As her blood pressure continues to run high on her readings and she is symptomatic will increase the amlodipine to 2.5 mg b.i.d. and continue the losartan in the morning.  Repeat blood work in 3 months to check the renal function and potassium.  She reports some dizziness does not appear to be related to the blood pressure so will watch for orthostasis and continue to follow orthostatic precautions and walk with a cane     Follow up in about 3 months (around 7/6/2022).    7/13/22  She is here for her routine checkup.  She monitors her blood pressure at home.  Her reading today and limit o'clock is 124/70 with heart rate 74. Is been running in the 120s to 140 occasionally up to 165 Jeanette heart flutter a few weeks ago but did last.  Her pacemaker checked the function on April 5th.  She has ongoing swelling in her lower extremities.  She only takes the Lasix p.r.n..  She watches her salt intake.  The edema is not much changed from previous although she does put her feet up at the end of the day it does not go away altogether.    She walks 1/4 mi a day in.  Her EKG today reveals sinus rhythm bifascicular block first-degree AV block right bundle-branch block and left anterior fascicular block  Her blood work was reviewed and everything was in the normal range her potassium is 4.2.  Admitting 1.2 BUN 22    07/07/22 0916 HDL 68 -- Final result   07/07/22 0916 CHOL  137 -- Final result   07/07/22 0916 TRIG 31 -- Final result   07/07/22 0916 LDLCALC 62.8 Important  Low Final result   07/07/22 0916 CHOLHDL 49.6 -- Final result   07/07/22 0916 NONHDLCHOL 69 -- Final result   07/07/22 0916 TOTALCHOLEST 2.0 -- Final r             Review of patient's allergies indicates:   Allergen Reactions    Demerol [meperidine]      restless       Past Medical History:   Diagnosis Date    Back ache     Back pain     Carotid bruit     Cervical disc disease     Chest pain     Coronary artery disease     Diffuse cystic mastopathy     Elevated blood pressure     Encounter for blood transfusion     Foot pain, left     Generalized osteoarthrosis     Herpes zoster without complications     Hypertension     Neck pain     Neuropathy     Osteoporosis     Palpitations     PONV (postoperative nausea and vomiting)     TIA (transient ischemic attack)      Past Surgical History:   Procedure Laterality Date    APPENDECTOMY      bladder and colon lift and retached      CARDIAC CATHETERIZATION      CATARACT EXTRACTION  02/23/2009    CHOLECYSTECTOMY      COLONOSCOPY Left 9/6/2021    Procedure: COLONOSCOPY;  Surgeon: Erlin Lainez MD;  Location: [facility];  Service: Endoscopy;  Laterality: Left;    ESOPHAGOGASTRODUODENOSCOPY Left 9/6/2021    Procedure: EGD (ESOPHAGOGASTRODUODENOSCOPY);  Surgeon: Erlin Lainez MD;  Location: [facility];  Service: Endoscopy;  Laterality: Left;    INSERTION OF PACEMAKER  12/2019    pt reported    TOTAL ABDOMINAL HYSTERECTOMY      TUBAL LIGATION       Social History     Tobacco Use    Smoking status: Never Smoker    Smokeless tobacco: Never Used   Substance Use Topics    Alcohol use: Not Currently     Comment: socially if ever     Family History   Problem Relation Age of Onset    Heart disease Father     Thyroid disease Father     Diabetes Father     Stroke Father     Diabetes Brother     Hypertension Brother     Hyperlipidemia  Brother     Heart disease Brother     Cancer Brother         lung    Hypertension Sister     Diabetes Sister     Heart disease Sister     Hyperlipidemia Sister     Dementia Mother     Down syndrome Daughter     COPD Daughter         Review of Systems:   Constitution: Negative for diaphoresis and fever.   HEENT: Negative for nosebleeds.    Cardiovascular: Negative for chest pain       No dyspnea on exertion       No leg swelling        No palpitations  Respiratory: Negative for shortness of breath and wheezing.    Hematologic/Lymphatic: Negative for bleeding problem. Does not bruise/bleed easily.   Skin: Negative for color change and rash.   Musculoskeletal: Negative for falls and myalgias.   Gastrointestinal: Negative for hematemesis and hematochezia.   Genitourinary: Negative for hematuria.   Neurological: Negative for dizziness and light-headedness.   Psychiatric/Behavioral: Negative for altered mental status and memory loss.          Objective:        There were no vitals filed for this visit.    Lab Results   Component Value Date    WBC 4.07 07/07/2022    HGB 12.2 07/07/2022    HCT 36.7 (L) 07/07/2022     07/07/2022    CHOL 137 07/07/2022    TRIG 31 07/07/2022    HDL 68 07/07/2022    ALT 14 07/07/2022    AST 18 07/07/2022     07/07/2022    K 4.2 07/07/2022     07/07/2022    CREATININE 1.2 07/07/2022    BUN 22 07/07/2022    CO2 29 07/07/2022    TSH 0.993 07/01/2016    INR 1.1 09/05/2021    HGBA1C 5.6 07/01/2016    MICROALBUR 1.2 02/28/2022        ECHOCARDIOGRAM RESULTS  Results for orders placed during the hospital encounter of 09/05/21    Echo Saline Bubble? No    Interpretation Summary  · The estimated PA systolic pressure is 46 mmHg.  · The left ventricle is normal in size with mild concentric hypertrophy and normal systolic function.  · The estimated ejection fraction is 65%.  · Normal left ventricular diastolic function.  · Normal right ventricular size with normal right  ventricular systolic function.  · Mild left atrial enlargement.  · Mild tricuspid regurgitation.  · Mild-to-moderate mitral regurgitation.        CURRENT/PREVIOUS VISIT EKG  Results for orders placed or performed during the hospital encounter of 04/03/22   EKG 12-lead    Collection Time: 04/03/22  9:31 AM    Narrative    Test Reason : I10,    Vent. Rate : 075 BPM     Atrial Rate : 075 BPM     P-R Int : 270 ms          QRS Dur : 148 ms      QT Int : 434 ms       P-R-T Axes : 055 -53 000 degrees     QTc Int : 484 ms    Sinus rhythm with 1st degree A-V block with occasional Premature  ventricular complexes  Right bundle branch block  Left anterior fascicular block   Bifascicular block   Abnormal ECG  When compared with ECG of 06-JAN-2022 13:50,  Premature ventricular complexes are now Present  Criteria for Septal infarct are no longer Present  T wave inversion less evident in Inferior leads  Confirmed by Renée CH, Jett BURRELL ([id number]) on 4/7/2022 9:07:30 PM    Referred By: [email or web address]   SELF           Confirmed By:Jett Chinchilla MD     No valid procedures specified.   No results found for this or any previous visit.      Physical Exam:  CONSTITUTIONAL: No fever, no chills  HEENT: Normocephalic, atraumatic,pupils reactive to light                 NECK:  No JVD no carotid bruit  CVS: S1S2+, RRR, no murmurs,   LUNGS: Clear  ABDOMEN: Soft, NT, BS+  EXTREMITIES: No cyanosis, edema  : No alvarado catheter  NEURO: AAO X 3  PSY: Normal affect      Medication List with Changes/Refills   Current Medications    ACETAMINOPHEN (TYLENOL EXTRA STRENGTH) 500 MG TABLET    Take 500 mg by mouth as needed for Pain.    AMLODIPINE (NORVASC) 2.5 MG TABLET    Take 1 tablet (2.5 mg total) by mouth 2 (two) times daily.    CHOLECALCIFEROL, VITAMIN D3, (VITAMIN D3) 25 MCG (1,000 UNIT) CAPSULE    Take 1,000 Units by mouth once daily.    CYANOCOBALAMIN 500 MCG TABLET    Take 500 mcg by mouth once daily.    FUROSEMIDE (LASIX) 20 MG TABLET    Take 1  tablet (20 mg total) by mouth once daily.    HYDRALAZINE (APRESOLINE) 10 MG TABLET    Take 1 tablet (10 mg total) by mouth daily as needed (TAKE 1 TAB PO PRN EVERY 2 HOURS FOR SBP>180 OR DBP>100).    LECITHIN-ISOPROPYL PALM, BULK, (LIPMAX) LIQD    3 Pump by Misc.(Non-Drug; Combo Route) route 4 (four) times daily.    LOSARTAN (COZAAR) 50 MG TABLET    Take 1 tablet (50 mg total) by mouth every evening.    TRIAMCINOLONE ACETONIDE 0.1% (KENALOG) 0.1 % CREAM    Apply topically 2 (two) times daily.    UNABLE TO FIND    Apply 3 Pump topically 5 (five) times daily. Ketoprofen/Cyclobenzaprine/Lidocaine Hcl (lipomax) 10/2/5%   APPLY UP TO 4.8 GRAMS (3 PUMPS) TO PAINFUL AREAS UP TO FIVE TIMES DAILY. RUB IN WELL    VITAMIN E 400 UNIT CAPSULE    Take 400 Units by mouth once daily.    ZOLPIDEM (AMBIEN) 5 MG TAB    Take 1/2 tablet nightly prn             Assessment:       1. Essential (primary) hypertension     2. Cardiac pacemaker in situ    3. Coronary artery disease involving native coronary artery of native heart without angina pectoris    4. Anemia, unspecified type    5. Hypertensive pulmonary vascular disease    6. Localized edema         Plan:     Problem List Items Addressed This Visit        Cardiac/Vascular    Coronary artery disease      Other Visit Diagnoses     Essential (primary) hypertension     -  Primary    Cardiac pacemaker in situ        Anemia, unspecified type        Hypertensive pulmonary vascular disease        Localized edema            We discussed salt intake and salt substitutes moderation with salt is recommended.  It is okay to take the Lasix every day for the edema.  Although the edema does not seem to bother her is chronic it is not necessary to try to get rid of the edema.  We discussed compression stockings but her to heart put on although she could get measured.      No follow-ups on file.    The patients questions were answered, they verbalized understanding, and agreed with the treatment plan.      TOI JOHNSTON MD  [facility]

## 2022-09-06 ENCOUNTER — TELEPHONE (OUTPATIENT)
Dept: FAMILY MEDICINE | Facility: CLINIC | Age: 87
End: 2022-09-06

## 2022-09-06 NOTE — TELEPHONE ENCOUNTER
----- Message from Imani Phelps sent at 9/6/2022  1:45 PM CDT -----  - pt would like an appt with rebecca   [phone]

## 2022-09-15 ENCOUNTER — OFFICE VISIT (OUTPATIENT)
Dept: FAMILY MEDICINE | Facility: CLINIC | Age: 87
End: 2022-09-15
Payer: MEDICARE

## 2022-09-15 VITALS
DIASTOLIC BLOOD PRESSURE: 66 MMHG | SYSTOLIC BLOOD PRESSURE: 132 MMHG | BODY MASS INDEX: 23.73 KG/M2 | HEART RATE: 76 BPM | WEIGHT: 139 LBS | HEIGHT: 64 IN

## 2022-09-15 DIAGNOSIS — Z79.899 HIGH RISK MEDICATION USE: ICD-10-CM

## 2022-09-15 DIAGNOSIS — Z00.00 PHYSICAL EXAM: ICD-10-CM

## 2022-09-15 DIAGNOSIS — Z95.0 PACEMAKER: ICD-10-CM

## 2022-09-15 DIAGNOSIS — I10 ESSENTIAL HYPERTENSION: Primary | ICD-10-CM

## 2022-09-15 DIAGNOSIS — K21.9 GASTROESOPHAGEAL REFLUX DISEASE, UNSPECIFIED WHETHER ESOPHAGITIS PRESENT: ICD-10-CM

## 2022-09-15 DIAGNOSIS — I25.10 CORONARY ARTERY DISEASE INVOLVING NATIVE CORONARY ARTERY OF NATIVE HEART WITHOUT ANGINA PECTORIS: ICD-10-CM

## 2022-09-15 DIAGNOSIS — Z86.79 HISTORY OF BUNDLE BRANCH BLOCK: ICD-10-CM

## 2022-09-15 PROCEDURE — 99214 OFFICE O/P EST MOD 30 MIN: CPT | Mod: S$GLB,,, | Performed by: NURSE PRACTITIONER

## 2022-09-15 PROCEDURE — 99214 PR OFFICE/OUTPT VISIT, EST, LEVL IV, 30-39 MIN: ICD-10-PCS | Mod: S$GLB,,, | Performed by: NURSE PRACTITIONER

## 2022-09-15 RX ORDER — TRIAMCINOLONE ACETONIDE 1 MG/G
OINTMENT TOPICAL 2 TIMES DAILY
Qty: 30 G | Refills: 0 | Status: SHIPPED | OUTPATIENT
Start: 2022-09-15 | End: 2023-05-17

## 2022-09-15 RX ORDER — GABAPENTIN 100 MG/1
100 CAPSULE ORAL NIGHTLY
Qty: 30 CAPSULE | Refills: 1 | Status: SHIPPED | OUTPATIENT
Start: 2022-09-15 | End: 2023-05-17

## 2022-09-15 NOTE — PROGRESS NOTES
SUBJECTIVE:    Patient ID: Vee Cadena is a 88 y.o. female.    Chief Complaint: Follow-up (Has some things to go over, no bottles// SW)    88 year old female presents for check up.  Recently moved back to [facility].  Living alone. Treated for htn, and insomnia.  followedby dr. Jett eldridge. Recently had echocardiogram. Ejection fraction 65%. Last stress test 1 year ago. Wnl. Had pacemaker checked recently.feeling ok. Appetite is stable. Not sleeping great. Does have pain in feet at night. Ast labs were in july    Lab Visit on 07/07/2022   Component Date Value Ref Range Status    Cholesterol 07/07/2022 137  120 - 199 mg/dL Final    Triglycerides 07/07/2022 31  30 - 150 mg/dL Final    HDL 07/07/2022 68  40 - 75 mg/dL Final    LDL Cholesterol 07/07/2022 62.8 (L)  63.0 - 159.0 mg/dL Final    HDL/Cholesterol Ratio 07/07/2022 49.6  20.0 - 50.0 % Final    Total Cholesterol/HDL Ratio 07/07/2022 2.0  2.0 - 5.0 Final    Non-HDL Cholesterol 07/07/2022 69  mg/dL Final    Sodium 07/07/2022 137  136 - 145 mmol/L Final    Potassium 07/07/2022 4.2  3.5 - 5.1 mmol/L Final    Chloride 07/07/2022 101  95 - 110 mmol/L Final    CO2 07/07/2022 29  23 - 29 mmol/L Final    Glucose 07/07/2022 104  70 - 110 mg/dL Final    BUN 07/07/2022 22  8 - 23 mg/dL Final    Creatinine 07/07/2022 1.2  0.5 - 1.4 mg/dL Final    Calcium 07/07/2022 9.0  8.7 - 10.5 mg/dL Final    Total Protein 07/07/2022 6.7  6.0 - 8.4 g/dL Final    Albumin 07/07/2022 3.9  3.5 - 5.2 g/dL Final    Total Bilirubin 07/07/2022 0.9  0.1 - 1.0 mg/dL Final    Alkaline Phosphatase 07/07/2022 57  55 - 135 U/L Final    AST 07/07/2022 18  10 - 40 U/L Final    ALT 07/07/2022 14  10 - 44 U/L Final    Anion Gap 07/07/2022 7 (L)  8 - 16 mmol/L Final    eGFR if  07/07/2022 46.6 (A)  >60 mL/min/1.73 m^2 Final    eGFR if non African American 07/07/2022 40.4 (A)  >60 mL/min/1.73 m^2 Final    WBC 07/07/2022 4.07  3.90 - 12.70 K/uL Final    RBC 07/07/2022 4.12  4.00 -  5.40 M/uL Final    Hemoglobin 07/07/2022 12.2  12.0 - 16.0 g/dL Final    Hematocrit 07/07/2022 36.7 (L)  37.0 - 48.5 % Final    MCV 07/07/2022 89  82 - 98 fL Final    MCH 07/07/2022 29.6  27.0 - 31.0 pg Final    MCHC 07/07/2022 33.2  32.0 - 36.0 g/dL Final    RDW 07/07/2022 13.5  11.5 - 14.5 % Final    Platelets 07/07/2022 199  150 - 450 K/uL Final    MPV 07/07/2022 9.4  9.2 - 12.9 fL Final   Hospital Outpatient Visit on 04/05/2022   Component Date Value Ref Range Status    Battery Voltage (V) 04/05/2022 3.02  V Final    P/R-wave RA Lead 04/05/2022 1.8  mV Final    P/R-wave RA Lead (native) 04/05/2022 5.3  mV Final    Impedance RA Lead 04/05/2022 456  Ohms Final    Impedance RA Lead (native) 04/05/2022 494  Ohms Final    Threshold V RA Lead 04/05/2022 0.6  V Final    Theshold ms RA Lead 04/05/2022 0.4  ms Final    Threshold V RA Lead (native) 04/05/2022 0.5  V Final    Threshold ms RA Lead (native) 04/05/2022 0.4  ms Final   Admission on 04/03/2022, Discharged on 04/03/2022   Component Date Value Ref Range Status    WBC 04/03/2022 5.83  3.90 - 12.70 K/uL Final    RBC 04/03/2022 4.12  4.00 - 5.40 M/uL Final    Hemoglobin 04/03/2022 12.1  12.0 - 16.0 g/dL Final    Hematocrit 04/03/2022 36.1 (L)  37.0 - 48.5 % Final    MCV 04/03/2022 88  82 - 98 fL Final    MCH 04/03/2022 29.4  27.0 - 31.0 pg Final    MCHC 04/03/2022 33.5  32.0 - 36.0 g/dL Final    RDW 04/03/2022 13.2  11.5 - 14.5 % Final    Platelets 04/03/2022 190  150 - 450 K/uL Final    MPV 04/03/2022 9.9  9.2 - 12.9 fL Final    Immature Granulocytes 04/03/2022 0.2  0.0 - 0.5 % Final    Gran # (ANC) 04/03/2022 4.3  1.8 - 7.7 K/uL Final    Immature Grans (Abs) 04/03/2022 0.01  0.00 - 0.04 K/uL Final    Lymph # 04/03/2022 1.0  1.0 - 4.8 K/uL Final    Mono # 04/03/2022 0.5  0.3 - 1.0 K/uL Final    Eos # 04/03/2022 0.1  0.0 - 0.5 K/uL Final    Baso # 04/03/2022 0.02  0.00 - 0.20 K/uL Final    nRBC 04/03/2022 0  0 /100 WBC Final    Gran % 04/03/2022 74.1 (H)  38.0 -  73.0 % Final    Lymph % 04/03/2022 16.5 (L)  18.0 - 48.0 % Final    Mono % 04/03/2022 7.9  4.0 - 15.0 % Final    Eosinophil % 04/03/2022 1.0  0.0 - 8.0 % Final    Basophil % 04/03/2022 0.3  0.0 - 1.9 % Final    Differential Method 04/03/2022 Automated   Final    Sodium 04/03/2022 135 (L)  136 - 145 mmol/L Final    Potassium 04/03/2022 4.4  3.5 - 5.1 mmol/L Final    Chloride 04/03/2022 103  95 - 110 mmol/L Final    CO2 04/03/2022 27  23 - 29 mmol/L Final    Glucose 04/03/2022 105  70 - 110 mg/dL Final    BUN 04/03/2022 23  8 - 23 mg/dL Final    Creatinine 04/03/2022 1.0  0.5 - 1.4 mg/dL Final    Calcium 04/03/2022 8.9  8.7 - 10.5 mg/dL Final    Total Protein 04/03/2022 6.3  6.0 - 8.4 g/dL Final    Albumin 04/03/2022 3.8  3.5 - 5.2 g/dL Final    Total Bilirubin 04/03/2022 0.8  0.1 - 1.0 mg/dL Final    Alkaline Phosphatase 04/03/2022 52 (L)  55 - 135 U/L Final    AST 04/03/2022 15  10 - 40 U/L Final    ALT 04/03/2022 13  10 - 44 U/L Final    Anion Gap 04/03/2022 5 (L)  8 - 16 mmol/L Final    eGFR if African American 04/03/2022 58.1 (A)  >60 mL/min/1.73 m^2 Final    eGFR if non African American 04/03/2022 50.4 (A)  >60 mL/min/1.73 m^2 Final    Troponin I 04/03/2022 <0.030  <=0.040 ng/mL Final    Troponin I 04/03/2022 <0.030  <=0.040 ng/mL Final    BNP 04/03/2022 312 (H)  0 - 99 pg/mL Final       Past Medical History:   Diagnosis Date    Back ache     Back pain     Carotid bruit     Cervical disc disease     Chest pain     Coronary artery disease     Diffuse cystic mastopathy     Elevated blood pressure     Encounter for blood transfusion     Foot pain, left     Generalized osteoarthrosis     Herpes zoster without complications     Hypertension     Neck pain     Neuropathy     Osteoporosis     Palpitations     PONV (postoperative nausea and vomiting)     TIA (transient ischemic attack)      Social History     Socioeconomic History    Marital status:     Number of children: 6   Tobacco Use    Smoking status: Never     Smokeless tobacco: Never   Substance and Sexual Activity    Alcohol use: Not Currently     Comment: socially if ever   Social History Narrative    Sleeps ok. Takes melatonin 5mg . About 7hrs    2 days per week     Walks 3-4 days per week     Past Surgical History:   Procedure Laterality Date    APPENDECTOMY      bladder and colon lift and retached      CARDIAC CATHETERIZATION      CATARACT EXTRACTION  02/23/2009    CHOLECYSTECTOMY      COLONOSCOPY Left 9/6/2021    Procedure: COLONOSCOPY;  Surgeon: Erlin Lainez MD;  Location: [facility];  Service: Endoscopy;  Laterality: Left;    ESOPHAGOGASTRODUODENOSCOPY Left 9/6/2021    Procedure: EGD (ESOPHAGOGASTRODUODENOSCOPY);  Surgeon: Erlin Lainez MD;  Location: [facility] ENDO;  Service: Endoscopy;  Laterality: Left;    INSERTION OF PACEMAKER  12/2019    pt reported    TOTAL ABDOMINAL HYSTERECTOMY      TUBAL LIGATION       Family History   Problem Relation Age of Onset    Heart disease Father     Thyroid disease Father     Diabetes Father     Stroke Father     Diabetes Brother     Hypertension Brother     Hyperlipidemia Brother     Heart disease Brother     Cancer Brother         lung    Hypertension Sister     Diabetes Sister     Heart disease Sister     Hyperlipidemia Sister     Dementia Mother     Down syndrome Daughter     COPD Daughter        Review of patient's allergies indicates:   Allergen Reactions    Demerol [meperidine]      restless       Current Outpatient Medications:     acetaminophen (TYLENOL) 500 MG tablet, Take 500 mg by mouth as needed for Pain., Disp: , Rfl:     amLODIPine (NORVASC) 2.5 MG tablet, Take 1 tablet (2.5 mg total) by mouth 2 (two) times daily., Disp: 90 tablet, Rfl: 3    cholecalciferol, vitamin D3, (VITAMIN D3) 25 mcg (1,000 unit) capsule, Take 1,000 Units by mouth once daily., Disp: , Rfl:     cyanocobalamin 500 MCG tablet, Take 500 mcg by mouth once daily., Disp: , Rfl:     furosemide (LASIX) 20 MG tablet, Take 1 tablet (20 mg  total) by mouth once daily., Disp: 30 tablet, Rfl: 3    gabapentin (NEURONTIN) 100 MG capsule, Take 1 capsule (100 mg total) by mouth every evening., Disp: 30 capsule, Rfl: 1    hydrALAZINE (APRESOLINE) 10 MG tablet, Take 1 tablet (10 mg total) by mouth daily as needed (TAKE 1 TAB PO PRN EVERY 2 HOURS FOR SBP>180 OR DBP>100)., Disp: 30 tablet, Rfl: 11    lecithin-isopropyl palm, bulk, (LIPMAX) Liqd, 3 Pump by Misc.(Non-Drug; Combo Route) route 4 (four) times daily., Disp: 120 mL, Rfl: 0    losartan (COZAAR) 50 MG tablet, Take 1 tablet (50 mg total) by mouth every evening., Disp: 90 tablet, Rfl: 3    triamcinolone acetonide 0.1% (KENALOG) 0.1 % ointment, Apply topically 2 (two) times daily., Disp: 30 g, Rfl: 0    UNABLE TO FIND, Apply 3 Pump topically 5 (five) times daily. Ketoprofen/Cyclobenzaprine/Lidocaine Hcl (lipomax) 10/2/5%  APPLY UP TO 4.8 GRAMS (3 PUMPS) TO PAINFUL AREAS UP TO FIVE TIMES DAILY. RUB IN WELL, Disp: , Rfl:     vitamin E 400 UNIT capsule, Take 400 Units by mouth once daily., Disp: , Rfl:     zolpidem (AMBIEN) 5 MG Tab, Take 1/2 tablet nightly prn (Patient taking differently: Take 2.5 mg by mouth nightly as needed. Take 1/2 tablet nightly prn), Disp: 45 tablet, Rfl: 1    Review of Systems   Constitutional:  Negative for chills, fever and unexpected weight change.   HENT:  Negative for ear pain, rhinorrhea and sore throat.    Eyes:  Negative for pain and visual disturbance.   Respiratory:  Negative for cough and shortness of breath.    Cardiovascular:  Negative for chest pain, palpitations and leg swelling.   Gastrointestinal:  Negative for abdominal pain, diarrhea, nausea and vomiting.   Genitourinary:  Negative for difficulty urinating, hematuria and vaginal bleeding.   Musculoskeletal:  Negative for arthralgias.   Skin:  Negative for rash.   Neurological:  Negative for dizziness, weakness and headaches.   Psychiatric/Behavioral:  Negative for agitation and sleep disturbance. The patient is not  "nervous/anxious.         Objective:      Vitals:    09/15/22 1146   BP: 132/66   Pulse: 76   Weight: 63 kg (139 lb)   Height: 5' 4" (1.626 m)     Physical Exam  Vitals and nursing note reviewed.   Constitutional:       Appearance: She is well-developed.   HENT:      Right Ear: External ear normal.      Left Ear: External ear normal.   Eyes:      Conjunctiva/sclera: Conjunctivae normal.      Pupils: Pupils are equal, round, and reactive to light.   Neck:      Vascular: No JVD.   Cardiovascular:      Rate and Rhythm: Normal rate. Rhythm irregular.      Heart sounds: No murmur heard.  Pulmonary:      Effort: Pulmonary effort is normal.      Breath sounds: Normal breath sounds.   Abdominal:      General: Bowel sounds are normal.      Palpations: Abdomen is soft.   Musculoskeletal:         General: No deformity. Normal range of motion.      Cervical back: Normal range of motion and neck supple.   Lymphadenopathy:      Cervical: No cervical adenopathy.   Skin:     General: Skin is warm and dry.      Findings: No rash.   Neurological:      Mental Status: She is alert and oriented to person, place, and time.      Gait: Gait abnormal.      Comments: Ambulates with cane   Psychiatric:         Speech: Speech normal.         Behavior: Behavior normal.         Assessment:       1. Essential hypertension    2. History of transient bundle branch block    3. Pacemaker    4. Gastroesophageal reflux disease, unspecified whether esophagitis present    5. Coronary artery disease involving native coronary artery of native heart without angina pectoris    6. High risk medication use    7. Physical exam         Plan:       Essential hypertension    History of transient bundle branch block    Pacemaker    Gastroesophageal reflux disease, unspecified whether esophagitis present    Coronary artery disease involving native coronary artery of native heart without angina pectoris    High risk medication use    Physical exam    Other orders  -   "   triamcinolone acetonide 0.1% (KENALOG) 0.1 % ointment; Apply topically 2 (two) times daily.  Dispense: 30 g; Refill: 0  -     gabapentin (NEURONTIN) 100 MG capsule; Take 1 capsule (100 mg total) by mouth every evening.  Dispense: 30 capsule; Refill: 1    Follow up in about 3 months (around 12/15/2022), or if symptoms worsen or fail to improve, for medication management.        9/27/2022 Denisse Beltrán

## 2022-09-22 ENCOUNTER — HISTORICAL (OUTPATIENT)
Dept: ADMINISTRATIVE | Facility: HOSPITAL | Age: 87
End: 2022-09-22
Payer: MEDICARE

## 2022-10-26 ENCOUNTER — TELEPHONE (OUTPATIENT)
Dept: FAMILY MEDICINE | Facility: CLINIC | Age: 87
End: 2022-10-26

## 2022-10-26 DIAGNOSIS — R59.9 LYMPH NODES ENLARGED: Primary | ICD-10-CM

## 2022-10-26 NOTE — TELEPHONE ENCOUNTER
----- Message from Imani Phelps sent at 10/26/2022 10:45 AM CDT -----  Pt has lump on the back side of her neck. Has not heard about the u/s that was supposed to be done   [phone]

## 2022-11-07 ENCOUNTER — HOSPITAL ENCOUNTER (OUTPATIENT)
Dept: RADIOLOGY | Facility: HOSPITAL | Age: 87
Discharge: HOME | End: 2022-11-07
Attending: NURSE PRACTITIONER
Payer: MEDICARE

## 2022-11-07 DIAGNOSIS — R59.9 LYMPH NODES ENLARGED: ICD-10-CM

## 2022-11-07 PROCEDURE — 76536 US EXAM OF HEAD AND NECK: CPT | Mod: TC,PO

## 2022-11-28 RX ORDER — CLOTRIMAZOLE AND BETAMETHASONE DIPROPIONATE 10; .64 MG/G; MG/G
1 CREAM TOPICAL 2 TIMES DAILY
Qty: 30 G | Refills: 1 | Status: SHIPPED | OUTPATIENT
Start: 2022-11-28 | End: 2023-05-17

## 2022-11-28 RX ORDER — CLOTRIMAZOLE AND BETAMETHASONE DIPROPIONATE 10; .64 MG/G; MG/G
1 CREAM TOPICAL 2 TIMES DAILY
COMMUNITY
Start: 2022-08-03 | End: 2022-11-28 | Stop reason: SDUPTHER

## 2022-11-28 NOTE — TELEPHONE ENCOUNTER
----- Message from Imani Phelps sent at 11/28/2022 10:25 AM CST -----  Pt needs refill on her cream   [facility] [place]   [phone]

## 2022-11-30 DIAGNOSIS — G62.9 NEUROPATHY: ICD-10-CM

## 2022-11-30 RX ORDER — LECITHIN/ISOPROPYL PALMITATE
3 LIQUID (ML) MISCELLANEOUS 4 TIMES DAILY
Qty: 120 ML | Refills: 0 | Status: SHIPPED | OUTPATIENT
Start: 2022-11-30 | End: 2023-05-17

## 2022-11-30 NOTE — TELEPHONE ENCOUNTER
----- Message from Imani Phelps sent at 11/30/2022  9:03 AM CST -----  [facility] on her lipomax cream   [facility] [phone]

## 2022-12-06 ENCOUNTER — TELEPHONE (OUTPATIENT)
Dept: CARDIOLOGY | Facility: CLINIC | Age: 87
End: 2022-12-06
Payer: MEDICARE

## 2022-12-06 NOTE — TELEPHONE ENCOUNTER
----- Message from Suleiman Shahid sent at 12/6/2022  2:22 PM CST -----  Type:  RX Refill Request  Who Called: Pt   Refill or New Rx: refill  RX Name and Strength: losartan (COZAAR) 50 MG tablet 90 tablet   How is the patient currently taking it? (ex. 1XDay):    Is this a 30 day or 90 day RX:    Preferred Pharmacy with phone number:  [id number] - [id number] [place] - [address].   Phone:  [phone]  Fax:  [phone]  Local or Mail Order:  Local  Ordering Provider:  Jett Chinchilla MD  Best Call Back Number:  [phone]  Additional Information: Pt requesting refill on Rx losartan (COZAAR) 50 MG tablet 90 tablet

## 2023-01-16 ENCOUNTER — NURSE TRIAGE (OUTPATIENT)
Dept: ADMINISTRATIVE | Facility: CLINIC | Age: 88
End: 2023-01-16
Payer: MEDICARE

## 2023-01-16 RX ORDER — AMLODIPINE BESYLATE 2.5 MG/1
2.5 TABLET ORAL 2 TIMES DAILY
Qty: 60 TABLET | Refills: 0 | Status: SHIPPED | OUTPATIENT
Start: 2023-01-16 | End: 2023-01-23 | Stop reason: SDUPTHER

## 2023-01-16 NOTE — TELEPHONE ENCOUNTER
Pt calls requesting refill of her amlodipine RX. Dr. Yang, OCP, submits refill to pt's preferred pharmacy and instructs NT to inform pt to call her cardiologist's office tomorrow for further refills.    Pt verbalizes understanding and denies any further questions or concerns at this time.    Reason for Disposition   [1] Prescription refill request for ESSENTIAL medicine (i.e., likelihood of harm to patient if not taken) AND [2] triager unable to refill per department policy    Protocols used: Medication Refill and Renewal Call-A-AH

## 2023-01-17 RX ORDER — AMLODIPINE BESYLATE 2.5 MG/1
TABLET ORAL
Qty: 180 TABLET | Refills: 0 | OUTPATIENT
Start: 2023-01-17

## 2023-01-17 NOTE — TELEPHONE ENCOUNTER
Spoke with pt, states he has her medication and will consult with Dr Chinchilla for refills at upcoming appointment.

## 2023-01-23 DIAGNOSIS — I10 ESSENTIAL (PRIMARY) HYPERTENSION: ICD-10-CM

## 2023-01-23 DIAGNOSIS — I27.20 HYPERTENSIVE PULMONARY VASCULAR DISEASE: Primary | ICD-10-CM

## 2023-01-23 RX ORDER — AMLODIPINE BESYLATE 2.5 MG/1
2.5 TABLET ORAL 2 TIMES DAILY
Qty: 60 TABLET | Refills: 3 | Status: SHIPPED | OUTPATIENT
Start: 2023-01-23 | End: 2023-05-17

## 2023-01-24 ENCOUNTER — OFFICE VISIT (OUTPATIENT)
Dept: CARDIOLOGY | Facility: CLINIC | Age: 88
End: 2023-01-24
Payer: MEDICARE

## 2023-01-24 VITALS
BODY MASS INDEX: 24 KG/M2 | SYSTOLIC BLOOD PRESSURE: 138 MMHG | HEART RATE: 75 BPM | WEIGHT: 140.56 LBS | HEIGHT: 64 IN | DIASTOLIC BLOOD PRESSURE: 76 MMHG | OXYGEN SATURATION: 96 %

## 2023-01-24 DIAGNOSIS — I27.20 HYPERTENSIVE PULMONARY VASCULAR DISEASE: ICD-10-CM

## 2023-01-24 DIAGNOSIS — D64.9 ANEMIA, UNSPECIFIED TYPE: ICD-10-CM

## 2023-01-24 DIAGNOSIS — I44.2 THIRD DEGREE AV BLOCK: ICD-10-CM

## 2023-01-24 DIAGNOSIS — I10 ESSENTIAL (PRIMARY) HYPERTENSION: ICD-10-CM

## 2023-01-24 DIAGNOSIS — Z95.0 CARDIAC PACEMAKER IN SITU: Primary | ICD-10-CM

## 2023-01-24 DIAGNOSIS — I25.10 CORONARY ARTERY DISEASE INVOLVING NATIVE CORONARY ARTERY OF NATIVE HEART WITHOUT ANGINA PECTORIS: ICD-10-CM

## 2023-01-24 DIAGNOSIS — I45.2 RBBB (RIGHT BUNDLE BRANCH BLOCK WITH LEFT ANTERIOR FASCICULAR BLOCK): ICD-10-CM

## 2023-01-24 DIAGNOSIS — R60.0 LOCALIZED EDEMA: ICD-10-CM

## 2023-01-24 PROCEDURE — 99999 PR PBB SHADOW E&M-EST. PATIENT-LVL III: ICD-10-PCS | Mod: PBBFAC,,, | Performed by: GENERAL PRACTICE

## 2023-01-24 PROCEDURE — 99213 PR OFFICE/OUTPT VISIT, EST, LEVL III, 20-29 MIN: ICD-10-PCS | Mod: S$PBB,,, | Performed by: GENERAL PRACTICE

## 2023-01-24 PROCEDURE — 99213 OFFICE O/P EST LOW 20 MIN: CPT | Mod: S$PBB,,, | Performed by: GENERAL PRACTICE

## 2023-01-24 PROCEDURE — 99213 OFFICE O/P EST LOW 20 MIN: CPT | Mod: PBBFAC,PN | Performed by: GENERAL PRACTICE

## 2023-01-24 PROCEDURE — 99999 PR PBB SHADOW E&M-EST. PATIENT-LVL III: CPT | Mod: PBBFAC,,, | Performed by: GENERAL PRACTICE

## 2023-01-24 NOTE — PROGRESS NOTES
Subjective:    Patient ID:  Vee Cadena is a 89 y.o. female who presents for follow-up of   Chief Complaint   Patient presents with    Coronary Artery Disease    Hypertension       HPI:    Vee Cadena is a 87 y.o. old female who  has a past medical history of Back ache, Back pain, Carotid bruit, Cervical disc disease, Chest pain, Coronary artery disease, Diffuse cystic mastopathy, Elevated blood pressure, Encounter for blood transfusion, Foot pain, left, Generalized osteoarthrosis, Herpes zoster without complications, Hypertension, Neck pain, Neuropathy, Osteoporosis, Palpitations, PONV (postoperative nausea and vomiting), and TIA (transient ischemic attack)..  WAS SEEN BY DR OCX IN HOSPITAL 9/5/21  Patient admitted with complaints of bloody stools, bright red blood per rectum.  She has history of pacer implant.  Denies chest pain shortness of breath no PND orthopnea no cough hemoptysis.  Patient has history of left heart catheterization but no reported history of intervention.  Risk factors are positive for family history, hypertension dyslipidemia.  Cardiac hemodynamics otherwise stable.  Telemetry stable.  No supraventricular ventricular arrhythmia.  Pacer function appears to be normal.  ECHO :  The estimated PA systolic pressure is 46 mmHg.  The left ventricle is normal in size with mild concentric hypertrophy and normal systolic function.  The estimated ejection fraction is 65%.  Normal left ventricular diastolic function.  Normal right ventricular size with normal right ventricular systolic function.  Mild left atrial enlargement.  Mild tricuspid regurgitation.  Mild-to-moderate mitral regurgitation.     2016:     Impression: NORMAL MYOCARDIAL PERFUSION   1. The perfusion scan is free of evidence for myocardial ischemia or injury.   2. Resting wall motion is physiologic.   3. Resting LV function is normal.   4. The ventricular volumes are normal at rest and stress.         SHE REPORTS THAT ABOUT 2  TIMES YEAR SHOW GETS IN HIS DISCOMFORT THIS STARTS IN HER MID CHEST AND GOES UP TO BOTH SIDES OF HER JAW.  IT DOES NOT LAST VERY LONG AS LONG SHE TAKES TUMS IT WILL GO AWAY     She is currently doing very well.  Her last pacemaker check was September by [employer].  She should be due flu another pacemaker check and approximately March.  She is taking hydralazine 10 mg once or twice since last visit for spike in blood pressure.  She has not having any significant symptoms.  I recommend continue the same medications for now notify us if she becomes dizzy or lightheaded and we can adjust her medications.  Continue to remain active.  I would recommend she continue to hold the aspirin.  Will stop the iron and recheck the blood count..        4/3     This is a 88-year-old female who presents emergency department with high blood pressure and headache.  Says her blood pressures been creeping up recently.  Upon discharge patient disclose that she had had her dose of amlodipine decreased within the last 2 months.  Patient states that she has had a headache for the last day or so off and on.  In the emergency department she has no focal neurological deficits.  Her CT scan did show any cranial hemorrhage, or any acute pathology.  Laboratory evaluation was entertained and was within normal limits.  Her EKG was chronically abnormal.  No acute changes noted.  Do not think that the patient spinning sensation she felt in her chest was related to acute coronary syndrome.  She has had 2-troponins in the emergency department.  Has not as stated above EKG unchanged.  Patient mostly concerned with her blood pressure.  She has an appointment with her cardiologist in 2 days.  She does have a slight amount of peripheral edema on exam and BNP is slightly elevated cell placed on Lasix for the next several days until she is able to follow-up with Cardiology for further adjustment of blood pressure medication and recommendations regarding her  blood pressure medications.  She and her daughter are comfortable with this plan and in agreement this plan.   Patient a main problem is with her blood pressure I do recommend a home of monitoring her blood pressure additional monitoring program is she is able.  Her readings at home appear to be higher than what we got today 130/80.  At home she is getting 150-160.   So we need to verify that her cuff is accurate and check twice a day for the next 2 weeks.  Lasix seems to help her edema in her lower extremities and the amlodipine could work against that so will continue the Lasix and potassium daily.  As her blood pressure continues to run high on her readings and she is symptomatic will increase the amlodipine to 2.5 mg b.i.d. and continue the losartan in the morning.  Repeat blood work in 3 months to check the renal function and potassium.  She reports some dizziness does not appear to be related to the blood pressure so will watch for orthostasis and continue to follow orthostatic precautions and walk with a cane     Follow up in about 3 months (around 7/6/2022).     7/13/22  She is here for her routine checkup.  She monitors her blood pressure at home.  Her reading today and limit o'clock is 124/70 with heart rate 74. Is been running in the 120s to 140 occasionally up to 165 [place] heart flutter a few weeks ago but did last.  Her pacemaker checked the function on April 5th.  She has ongoing swelling in her lower extremities.  She only takes the Lasix p.r.n..  She watches her salt intake.  The edema is not much changed from previous although she does put her feet up at the end of the day it does not go away altogether.     She walks 1/4 mi a day in.  Her EKG today reveals sinus rhythm bifascicular block first-degree AV block right bundle-branch block and left anterior fascicular block  Her blood work was reviewed and everything was in the normal range her potassium is 4.2.  Admitting 1.2 BUN 22     07/07/22 0916  HDL 68 -- Final result   07/07/22 0916 CHOL 137 -- Final result   07/07/22 0916 TRIG 31 -- Final result   07/07/22 0916 LDLCALC 62.8 Important  Low Final result   07/07/22 0916 CHOLHDL 49.6 -- Final result   07/07/22 0916 NONHDLCHOL 69 -- Final result   07/07/22 0916 TOTALCHOLEST 2.0 -- Final r          We discussed salt intake and salt substitutes moderation with salt is recommended.  It is okay to take the Lasix every day for the edema.  Although the edema does not seem to bother her is chronic it is not necessary to try to get rid of the edema.  We discussed compression stockings but her to heart put on although she could get measured.      1/24/23  Here for routine followup. Has imbalance when gets up sometimes. Uses cane to help.  Ankles swell some feel full.   BP went up once 230 /95 TOOK HYDRALAZINE.  TRIFASCICULAR BLOCK  ON EKG HAS PACEMAKER      Review of patient's allergies indicates:   Allergen Reactions    Demerol [meperidine]      restless       Past Medical History:   Diagnosis Date    Back ache     Back pain     Carotid bruit     Cervical disc disease     Chest pain     Coronary artery disease     Diffuse cystic mastopathy     Elevated blood pressure     Encounter for blood transfusion     Foot pain, left     Generalized osteoarthrosis     Herpes zoster without complications     Hypertension     Neck pain     Neuropathy     Osteoporosis     Palpitations     PONV (postoperative nausea and vomiting)     TIA (transient ischemic attack)      Past Surgical History:   Procedure Laterality Date    APPENDECTOMY      bladder and colon lift and retached      CARDIAC CATHETERIZATION      CATARACT EXTRACTION  02/23/2009    CHOLECYSTECTOMY      COLONOSCOPY Left 9/6/2021    Procedure: COLONOSCOPY;  Surgeon: Erlin Lainez MD;  Location: [facility];  Service: Endoscopy;  Laterality: Left;    ESOPHAGOGASTRODUODENOSCOPY Left 9/6/2021    Procedure: EGD (ESOPHAGOGASTRODUODENOSCOPY);  Surgeon: Erlin SOSA  MD Migel;  Location: [facility];  Service: Endoscopy;  Laterality: Left;    INSERTION OF PACEMAKER  12/2019    pt reported    TOTAL ABDOMINAL HYSTERECTOMY      TUBAL LIGATION       Social History     Tobacco Use    Smoking status: Never    Smokeless tobacco: Never   Substance Use Topics    Alcohol use: Not Currently     Comment: socially if ever     Family History   Problem Relation Age of Onset    Heart disease Father     Thyroid disease Father     Diabetes Father     Stroke Father     Diabetes Brother     Hypertension Brother     Hyperlipidemia Brother     Heart disease Brother     Cancer Brother         lung    Hypertension Sister     Diabetes Sister     Heart disease Sister     Hyperlipidemia Sister     Dementia Mother     Down syndrome Daughter     COPD Daughter         Review of Systems:   Constitution: Negative for diaphoresis and fever.   HEENT: Negative for nosebleeds.    Cardiovascular: Negative for chest pain       No dyspnea on exertion       No leg swelling        No palpitations  Respiratory: Negative for shortness of breath and wheezing.    Hematologic/Lymphatic: Negative for bleeding problem. Does not bruise/bleed easily.   Skin: Negative for color change and rash.   Musculoskeletal: Negative for falls and myalgias.   Gastrointestinal: Negative for hematemesis and hematochezia.   Genitourinary: Negative for hematuria.   Neurological: Negative for dizziness and light-headedness.   Psychiatric/Behavioral: Negative for altered mental status and memory loss.          Objective:        Vitals:    01/24/23 1331   BP: 138/76   Pulse: 75       Lab Results   Component Value Date    WBC 4.07 07/07/2022    HGB 12.2 07/07/2022    HCT 36.7 (L) 07/07/2022     07/07/2022    CHOL 137 07/07/2022    TRIG 31 07/07/2022    HDL 68 07/07/2022    ALT 14 07/07/2022    AST 18 07/07/2022     07/07/2022    K 4.2 07/07/2022     07/07/2022    CREATININE 1.2 07/07/2022    BUN 22 07/07/2022    CO2 29 07/07/2022     TSH 0.993 07/01/2016    INR 1.1 09/05/2021    HGBA1C 5.6 07/01/2016    MICROALBUR 1.2 02/28/2022        ECHOCARDIOGRAM RESULTS  Results for orders placed during the hospital encounter of 09/05/21    Echo Saline Bubble? No    Interpretation Summary  · The estimated PA systolic pressure is 46 mmHg.  · The left ventricle is normal in size with mild concentric hypertrophy and normal systolic function.  · The estimated ejection fraction is 65%.  · Normal left ventricular diastolic function.  · Normal right ventricular size with normal right ventricular systolic function.  · Mild left atrial enlargement.  · Mild tricuspid regurgitation.  · Mild-to-moderate mitral regurgitation.        CURRENT/PREVIOUS VISIT EKG  Results for orders placed or performed in visit on 07/13/22   IN OFFICE EKG 12-LEAD (to [facility])    Collection Time: 07/13/22  3:14 PM    Narrative    Test Reason : Z95.0,R60.0,R06.02,    Vent. Rate : 073 BPM     Atrial Rate : 073 BPM     P-R Int : 328 ms          QRS Dur : 138 ms      QT Int : 418 ms       P-R-T Axes : 066 -69 -50 degrees     QTc Int : 460 ms    Sinus rhythm with 1st degree A-V block  Right bundle branch block  Left anterior fascicular block   Bifascicular block   Septal infarct ,age undetermined  T wave abnormality, consider inferolateral ischemia  Abnormal ECG  When compared with ECG of 03-APR-2022 09:31,  Premature ventricular complexes are no longer Present  Septal infarct is now Present  T wave inversion more evident in Inferior leads  T wave inversion now evident in Lateral leads  Confirmed by Renée CH, Jett BURRELL ([id number]) on 8/2/2022 6:00:04 PM    Referred By:  RENÉE           Confirmed By:Jett Chinchilla MD     No valid procedures specified.   No results found for this or any previous visit.      Physical Exam:  CONSTITUTIONAL: No fever, no chills  HEENT: Normocephalic, atraumatic,pupils reactive to light                 NECK:  No JVD no carotid bruit  CVS: S1S2+, RRR, no murmurs,   LUNGS:  Clear PACER SITE OK  ABDOMEN: Soft, NT, BS+  EXTREMITIES: No cyanosis, 2 PLUS ANKLE edema  : No alvarado catheter  NEURO: AAO X 3  PSY: Normal affect      Medication List with Changes/Refills   Current Medications    ACETAMINOPHEN (TYLENOL) 500 MG TABLET    Take 500 mg by mouth as needed for Pain.    AMLODIPINE (NORVASC) 2.5 MG TABLET    Take 1 tablet (2.5 mg total) by mouth 2 (two) times daily.    CHOLECALCIFEROL, VITAMIN D3, (VITAMIN D3) 25 MCG (1,000 UNIT) CAPSULE    Take 1,000 Units by mouth once daily.    CLOTRIMAZOLE-BETAMETHASONE 1-0.05% (LOTRISONE) CREAM    Apply 1 g topically 2 (two) times daily.    CYANOCOBALAMIN 500 MCG TABLET    Take 500 mcg by mouth once daily.    FUROSEMIDE (LASIX) 20 MG TABLET    Take 1 tablet (20 mg total) by mouth once daily.    GABAPENTIN (NEURONTIN) 100 MG CAPSULE    Take 1 capsule (100 mg total) by mouth every evening.    HYDRALAZINE (APRESOLINE) 10 MG TABLET    Take 1 tablet (10 mg total) by mouth daily as needed (TAKE 1 TAB PO PRN EVERY 2 HOURS FOR SBP>180 OR DBP>100).    LECITHIN-ISOPROPYL PALM, BULK, (LIPMAX) LIQD    3 Pump by Misc.(Non-Drug; Combo Route) route 4 (four) times daily.    LOSARTAN (COZAAR) 50 MG TABLET    TAKE 1 TABLET BY MOUTH ONCE DAILY IN THE EVENING    TRIAMCINOLONE ACETONIDE 0.1% (KENALOG) 0.1 % OINTMENT    Apply topically 2 (two) times daily.    UNABLE TO FIND    Apply 3 Pump topically 5 (five) times daily. Ketoprofen/Cyclobenzaprine/Lidocaine Hcl (lipomax) 10/2/5%   APPLY UP TO 4.8 GRAMS (3 PUMPS) TO PAINFUL AREAS UP TO FIVE TIMES DAILY. RUB IN WELL    VITAMIN E 400 UNIT CAPSULE    Take 400 Units by mouth once daily.    ZOLPIDEM (AMBIEN) 5 MG TAB    Take 1/2 tablet nightly prn             Assessment:       1. Cardiac pacemaker in situ    2. Essential (primary) hypertension     3. Coronary artery disease involving native coronary artery of native heart without angina pectoris    4. Hypertensive pulmonary vascular disease    5. Localized edema    6. RBBB  (right bundle branch block with left anterior fascicular block)    7. Third degree AV block    8. Anemia, unspecified type         Plan:     Problem List Items Addressed This Visit          Cardiac/Vascular    Coronary artery disease    Relevant Orders    Hypertension Digital Medicine (HDMP) Enrollment Order (Completed)    Hypertension Digital Medicine (HDMP): Assign Onboarding Questionnaires (Completed)     Other Visit Diagnoses       Cardiac pacemaker in situ    -  Primary    Relevant Orders    Hypertension Digital Medicine (HDMP) Enrollment Order (Completed)    Hypertension Digital Medicine (HDMP): Assign Onboarding Questionnaires (Completed)    Essential (primary) hypertension         Relevant Orders    Hypertension Digital Medicine (HDMP) Enrollment Order (Completed)    Hypertension Digital Medicine (HDMP): Assign Onboarding Questionnaires (Completed)    Hypertensive pulmonary vascular disease        Localized edema        RBBB (right bundle branch block with left anterior fascicular block)        Relevant Orders    Hypertension Digital Medicine (HDMP) Enrollment Order (Completed)    Hypertension Digital Medicine (HDMP): Assign Onboarding Questionnaires (Completed)    Third degree AV block        Relevant Orders    Hypertension Digital Medicine (HDMP) Enrollment Order (Completed)    Hypertension Digital Medicine (HDMP): Assign Onboarding Questionnaires (Completed)    Anemia, unspecified type        Relevant Orders    Hypertension Digital Medicine (HDMP) Enrollment Order (Completed)    Hypertension Digital Medicine (HDMP): Assign Onboarding Questionnaires (Completed)          CONTINUE same medications activity.Exercise with yoga, weights at home.    Meds reviewed      Follow up in about 3 months (around 4/24/2023).    The patients questions were answered, they verbalized understanding, and agreed with the treatment plan.     TIO JOHNSTON MD  [facility]

## 2023-02-02 ENCOUNTER — PATIENT MESSAGE (OUTPATIENT)
Dept: ADMINISTRATIVE | Facility: OTHER | Age: 88
End: 2023-02-02
Payer: MEDICARE

## 2023-02-06 ENCOUNTER — PATIENT MESSAGE (OUTPATIENT)
Dept: ADMINISTRATIVE | Facility: OTHER | Age: 88
End: 2023-02-06
Payer: MEDICARE

## 2023-02-07 ENCOUNTER — TELEPHONE (OUTPATIENT)
Dept: FAMILY MEDICINE | Facility: CLINIC | Age: 88
End: 2023-02-07

## 2023-02-07 NOTE — TELEPHONE ENCOUNTER
----- Message from Arelis Geller sent at 2/7/2023  9:33 AM CST -----  Patient called and stated that she need to be seen soon with pain lower part of her stomach and its been going on for awhile please give her a call at [phone]

## 2023-02-08 ENCOUNTER — OFFICE VISIT (OUTPATIENT)
Dept: FAMILY MEDICINE | Facility: CLINIC | Age: 88
End: 2023-02-08
Payer: MEDICARE

## 2023-02-08 VITALS
BODY MASS INDEX: 23.9 KG/M2 | HEIGHT: 64 IN | OXYGEN SATURATION: 95 % | SYSTOLIC BLOOD PRESSURE: 130 MMHG | DIASTOLIC BLOOD PRESSURE: 70 MMHG | HEART RATE: 76 BPM | WEIGHT: 140 LBS

## 2023-02-08 DIAGNOSIS — Z79.899 HIGH RISK MEDICATION USE: ICD-10-CM

## 2023-02-08 DIAGNOSIS — K21.9 GASTROESOPHAGEAL REFLUX DISEASE, UNSPECIFIED WHETHER ESOPHAGITIS PRESENT: ICD-10-CM

## 2023-02-08 DIAGNOSIS — G47.00 INSOMNIA, UNSPECIFIED TYPE: ICD-10-CM

## 2023-02-08 DIAGNOSIS — Z95.0 PACEMAKER: ICD-10-CM

## 2023-02-08 DIAGNOSIS — I10 ESSENTIAL HYPERTENSION: ICD-10-CM

## 2023-02-08 DIAGNOSIS — G62.9 NEUROPATHY: ICD-10-CM

## 2023-02-08 DIAGNOSIS — N18.32 STAGE 3B CHRONIC KIDNEY DISEASE: ICD-10-CM

## 2023-02-08 DIAGNOSIS — R14.3 FLATULENCE: ICD-10-CM

## 2023-02-08 DIAGNOSIS — K57.92 DIVERTICULITIS: ICD-10-CM

## 2023-02-08 DIAGNOSIS — I25.10 CORONARY ARTERY DISEASE INVOLVING NATIVE CORONARY ARTERY OF NATIVE HEART WITHOUT ANGINA PECTORIS: Primary | ICD-10-CM

## 2023-02-08 PROCEDURE — 99214 PR OFFICE/OUTPT VISIT, EST, LEVL IV, 30-39 MIN: ICD-10-PCS | Mod: S$GLB,,, | Performed by: NURSE PRACTITIONER

## 2023-02-08 PROCEDURE — 99214 OFFICE O/P EST MOD 30 MIN: CPT | Mod: S$GLB,,, | Performed by: NURSE PRACTITIONER

## 2023-02-08 RX ORDER — BUTYROSPERMUM PARKII(SHEA BUTTER), SIMMONDSIA CHINENSIS (JOJOBA) SEED OIL, ALOE BARBADENSIS LEAF EXTRACT .01; 1; 3.5 G/100G; G/100G; G/100G
250 LIQUID TOPICAL 2 TIMES DAILY
Qty: 60 CAPSULE | Refills: 1 | Status: SHIPPED | OUTPATIENT
Start: 2023-02-08 | End: 2023-05-29

## 2023-02-08 RX ORDER — METRONIDAZOLE 500 MG/1
500 TABLET ORAL 3 TIMES DAILY
Qty: 21 TABLET | Refills: 0 | Status: SHIPPED | OUTPATIENT
Start: 2023-02-08 | End: 2023-05-17

## 2023-02-08 RX ORDER — CIPROFLOXACIN 500 MG/1
500 TABLET ORAL EVERY 12 HOURS
Qty: 20 TABLET | Refills: 0 | Status: SHIPPED | OUTPATIENT
Start: 2023-02-08 | End: 2023-05-17

## 2023-02-08 RX ORDER — SIMETHICONE 125 MG
125 TABLET,CHEWABLE ORAL EVERY 6 HOURS PRN
Qty: 60 TABLET | Refills: 3 | Status: SHIPPED | OUTPATIENT
Start: 2023-02-08 | End: 2023-05-29

## 2023-02-08 NOTE — PROGRESS NOTES
SUBJECTIVE:    Patient ID: Vee Cadena is a 89 y.o. female.    Chief Complaint: Abdominal Pain (Verbally went over meds/right lower side of stomach X 3 weeks off and on/BG)    89 year old female presents for urgent visit.   Living alone. Treated for htn, and insomnia.  followedby dr. Jett eldridge. Today is complaining of llq pain. Pain started about 3 weeks ago. Pain is intermittent and describes as stabbing at times. No nvd. No appetite changes. No pain with urination. Does have history of diverticulitis.       Historical on 09/22/2022   Component Date Value Ref Range Status    Inflenza A Ag 01/24/2020 Negative   Final    Influenza B Ag 01/24/2020 Negative   Final   Historical on 09/22/2022   Component Date Value Ref Range Status    Inflenza A Ag 12/29/2018 Negative   Final    Influenza B Ag 12/29/2018 Negative   Final    Rapid Group A Strep 12/29/2018 Negative   Final       Past Medical History:   Diagnosis Date    Back ache     Back pain     Carotid bruit     Cervical disc disease     Chest pain     Coronary artery disease     Diffuse cystic mastopathy     Elevated blood pressure     Encounter for blood transfusion     Foot pain, left     Generalized osteoarthrosis     Herpes zoster without complications     Hypertension     Neck pain     Neuropathy     Osteoporosis     Palpitations     PONV (postoperative nausea and vomiting)     TIA (transient ischemic attack)      Social History     Socioeconomic History    Marital status:     Number of children: 6   Tobacco Use    Smoking status: Never    Smokeless tobacco: Never   Substance and Sexual Activity    Alcohol use: Not Currently     Comment: socially if ever   Social History Narrative    Sleeps ok. Takes melatonin 5mg . About 7hrs    2 days per week     Walks 3-4 days per week     Past Surgical History:   Procedure Laterality Date    APPENDECTOMY      bladder and colon lift and retached      CARDIAC CATHETERIZATION      CATARACT EXTRACTION  02/23/2009     CHOLECYSTECTOMY      COLONOSCOPY Left 9/6/2021    Procedure: COLONOSCOPY;  Surgeon: Erlin Lainez MD;  Location: [facility] ENDO;  Service: Endoscopy;  Laterality: Left;    ESOPHAGOGASTRODUODENOSCOPY Left 9/6/2021    Procedure: EGD (ESOPHAGOGASTRODUODENOSCOPY);  Surgeon: Erlin Lainez MD;  Location: [facility] ENDO;  Service: Endoscopy;  Laterality: Left;    INSERTION OF PACEMAKER  12/2019    pt reported    TOTAL ABDOMINAL HYSTERECTOMY      TUBAL LIGATION       Family History   Problem Relation Age of Onset    Heart disease Father     Thyroid disease Father     Diabetes Father     Stroke Father     Diabetes Brother     Hypertension Brother     Hyperlipidemia Brother     Heart disease Brother     Cancer Brother         lung    Hypertension Sister     Diabetes Sister     Heart disease Sister     Hyperlipidemia Sister     Dementia Mother     Down syndrome Daughter     COPD Daughter        Review of patient's allergies indicates:   Allergen Reactions    Demerol [meperidine]      restless       Current Outpatient Medications:     acetaminophen (TYLENOL) 500 MG tablet, Take 500 mg by mouth as needed for Pain. PRN, Disp: , Rfl:     amLODIPine (NORVASC) 2.5 MG tablet, Take 1 tablet (2.5 mg total) by mouth 2 (two) times daily., Disp: 60 tablet, Rfl: 3    cholecalciferol, vitamin D3, (VITAMIN D3) 25 mcg (1,000 unit) capsule, Take 1,000 Units by mouth once daily., Disp: , Rfl:     cyanocobalamin 500 MCG tablet, Take 500 mcg by mouth once daily., Disp: , Rfl:     gabapentin (NEURONTIN) 100 MG capsule, Take 1 capsule (100 mg total) by mouth every evening. (Patient taking differently: Take 100 mg by mouth every evening. PRN), Disp: 30 capsule, Rfl: 1    hydrALAZINE (APRESOLINE) 10 MG tablet, Take 1 tablet (10 mg total) by mouth daily as needed (TAKE 1 TAB PO PRN EVERY 2 HOURS FOR SBP>180 OR DBP>100)., Disp: 30 tablet, Rfl: 11    lecithin-isopropyl palm, bulk, (LIPMAX) Liqd, 3 Pump by Misc.(Non-Drug; Combo Route) route 4  (four) times daily. (Patient taking differently: 3 Pump by Misc.(Non-Drug; Combo Route) route 4 (four) times daily. PRN), Disp: 120 mL, Rfl: 0    losartan (COZAAR) 50 MG tablet, TAKE 1 TABLET BY MOUTH ONCE DAILY IN THE EVENING, Disp: 90 tablet, Rfl: 0    vitamin E 400 UNIT capsule, Take 400 Units by mouth once daily., Disp: , Rfl:     zolpidem (AMBIEN) 5 MG Tab, Take 1/2 tablet nightly prn (Patient taking differently: Take 2.5 mg by mouth nightly as needed. Take 1/2 tablet nightly prn), Disp: 45 tablet, Rfl: 1    ciprofloxacin HCl (CIPRO) 500 MG tablet, Take 1 tablet (500 mg total) by mouth every 12 (twelve) hours., Disp: 20 tablet, Rfl: 0    clotrimazole-betamethasone 1-0.05% (LOTRISONE) cream, Apply 1 g topically 2 (two) times daily. (Patient not taking: Reported on 1/24/2023), Disp: 30 g, Rfl: 1    furosemide (LASIX) 20 MG tablet, Take 1 tablet (20 mg total) by mouth once daily. (Patient not taking: Reported on 1/24/2023), Disp: 30 tablet, Rfl: 3    metroNIDAZOLE (FLAGYL) 500 MG tablet, Take 1 tablet (500 mg total) by mouth 3 (three) times daily., Disp: 21 tablet, Rfl: 0    Saccharomyces boulardii (FLORASTOR) 250 mg capsule, Take 1 capsule (250 mg total) by mouth 2 (two) times daily., Disp: 60 capsule, Rfl: 1    simethicone (MYLICON) 125 MG chewable tablet, Take 1 tablet (125 mg total) by mouth every 6 (six) hours as needed for Flatulence., Disp: 60 tablet, Rfl: 3    triamcinolone acetonide 0.1% (KENALOG) 0.1 % ointment, Apply topically 2 (two) times daily. (Patient not taking: Reported on 1/24/2023), Disp: 30 g, Rfl: 0    UNABLE TO FIND, Apply 3 Pump topically 5 (five) times daily. Ketoprofen/Cyclobenzaprine/Lidocaine Hcl (lipomax) 10/2/5%  APPLY UP TO 4.8 GRAMS (3 PUMPS) TO PAINFUL AREAS UP TO FIVE TIMES DAILY. RUB IN WELL, Disp: , Rfl:     Review of Systems   Constitutional:  Negative for chills, fever and unexpected weight change.   HENT:  Negative for ear pain, rhinorrhea and sore throat.    Respiratory:   "Negative for cough and shortness of breath.    Cardiovascular:  Negative for chest pain, palpitations and leg swelling.   Gastrointestinal:  Positive for abdominal pain. Negative for diarrhea, nausea and vomiting.   Genitourinary:  Negative for difficulty urinating, hematuria and vaginal bleeding.   Musculoskeletal:  Negative for arthralgias.   Skin:  Negative for rash.   Neurological:  Negative for dizziness, weakness and headaches.   Psychiatric/Behavioral:  Negative for agitation and sleep disturbance. The patient is not nervous/anxious.         Objective:      Vitals:    02/08/23 0834   BP: 130/70   Pulse: 76   SpO2: 95%   Weight: 63.5 kg (140 lb)   Height: 5' 4" (1.626 m)     Physical Exam  Vitals and nursing note reviewed.   Constitutional:       General: She is not in acute distress.     Appearance: Normal appearance. She is well-developed.      Comments: Elderly female   HENT:      Head: Normocephalic.      Right Ear: External ear normal.      Left Ear: External ear normal.   Eyes:      Conjunctiva/sclera: Conjunctivae normal.   Neck:      Vascular: No JVD.   Cardiovascular:      Rate and Rhythm: Normal rate and regular rhythm.      Heart sounds: No murmur heard.  Pulmonary:      Effort: Pulmonary effort is normal.      Breath sounds: Normal breath sounds.   Abdominal:      General: Bowel sounds are normal.      Palpations: Abdomen is soft.      Tenderness: There is abdominal tenderness in the left lower quadrant. There is no right CVA tenderness or left CVA tenderness.   Musculoskeletal:         General: No deformity. Normal range of motion.      Cervical back: Normal range of motion and neck supple.      Right hip: Crepitus present. Normal range of motion.      Left hip: Crepitus present. Normal range of motion.   Lymphadenopathy:      Cervical: No cervical adenopathy.   Skin:     General: Skin is warm and dry.      Findings: No rash.   Neurological:      Mental Status: She is alert and oriented to person, " place, and time.      Gait: Gait normal.   Psychiatric:         Speech: Speech normal.         Behavior: Behavior normal.         Assessment:       1. Coronary artery disease involving native coronary artery of native heart without angina pectoris    2. Insomnia, unspecified type    3. Neuropathy    4. Gastroesophageal reflux disease, unspecified whether esophagitis present    5. High risk medication use    6. Flatulence    7. Diverticulitis    8. Stage 3b chronic kidney disease    9. Pacemaker    10. Essential hypertension           Plan:       Coronary artery disease involving native coronary artery of native heart without angina pectoris    Insomnia, unspecified type  -     CBC Auto Differential; Future; Expected date: 02/08/2023  -     Comprehensive Metabolic Panel; Future; Expected date: 02/08/2023  -     Lipid Panel; Future; Expected date: 02/08/2023  -     TSH w/reflex to FT4; Future; Expected date: 02/08/2023  -     Microalbumin/Creatinine Ratio, Urine; Future; Expected date: 02/08/2023  -     Urinalysis, Reflex to Urine Culture Urine, Clean Catch; Future; Expected date: 02/08/2023    Neuropathy  -     CBC Auto Differential; Future; Expected date: 02/08/2023  -     Comprehensive Metabolic Panel; Future; Expected date: 02/08/2023  -     Lipid Panel; Future; Expected date: 02/08/2023  -     TSH w/reflex to FT4; Future; Expected date: 02/08/2023  -     Microalbumin/Creatinine Ratio, Urine; Future; Expected date: 02/08/2023  -     Urinalysis, Reflex to Urine Culture Urine, Clean Catch; Future; Expected date: 02/08/2023    Gastroesophageal reflux disease, unspecified whether esophagitis present  -     CBC Auto Differential; Future; Expected date: 02/08/2023  -     Comprehensive Metabolic Panel; Future; Expected date: 02/08/2023  -     Lipid Panel; Future; Expected date: 02/08/2023  -     TSH w/reflex to FT4; Future; Expected date: 02/08/2023  -     Microalbumin/Creatinine Ratio, Urine; Future; Expected date:  02/08/2023  -     Urinalysis, Reflex to Urine Culture Urine, Clean Catch; Future; Expected date: 02/08/2023    High risk medication use  -     CBC Auto Differential; Future; Expected date: 02/08/2023  -     Comprehensive Metabolic Panel; Future; Expected date: 02/08/2023  -     Lipid Panel; Future; Expected date: 02/08/2023  -     TSH w/reflex to FT4; Future; Expected date: 02/08/2023  -     Microalbumin/Creatinine Ratio, Urine; Future; Expected date: 02/08/2023  -     Urinalysis, Reflex to Urine Culture Urine, Clean Catch; Future; Expected date: 02/08/2023  -     simethicone (MYLICON) 125 MG chewable tablet; Take 1 tablet (125 mg total) by mouth every 6 (six) hours as needed for Flatulence.  Dispense: 60 tablet; Refill: 3  -     Saccharomyces boulardii (FLORASTOR) 250 mg capsule; Take 1 capsule (250 mg total) by mouth 2 (two) times daily.  Dispense: 60 capsule; Refill: 1  -     ciprofloxacin HCl (CIPRO) 500 MG tablet; Take 1 tablet (500 mg total) by mouth every 12 (twelve) hours.  Dispense: 20 tablet; Refill: 0  -     metroNIDAZOLE (FLAGYL) 500 MG tablet; Take 1 tablet (500 mg total) by mouth 3 (three) times daily.  Dispense: 21 tablet; Refill: 0    Flatulence  -     simethicone (MYLICON) 125 MG chewable tablet; Take 1 tablet (125 mg total) by mouth every 6 (six) hours as needed for Flatulence.  Dispense: 60 tablet; Refill: 3    Diverticulitis  Comments:  call if symptoms do not improve  Orders:  -     Saccharomyces boulardii (FLORASTOR) 250 mg capsule; Take 1 capsule (250 mg total) by mouth 2 (two) times daily.  Dispense: 60 capsule; Refill: 1  -     ciprofloxacin HCl (CIPRO) 500 MG tablet; Take 1 tablet (500 mg total) by mouth every 12 (twelve) hours.  Dispense: 20 tablet; Refill: 0  -     metroNIDAZOLE (FLAGYL) 500 MG tablet; Take 1 tablet (500 mg total) by mouth 3 (three) times daily.  Dispense: 21 tablet; Refill: 0    Stage 3b chronic kidney disease    Pacemaker    Essential hypertension      Follow up if  symptoms worsen or fail to improve, for medication management.        2/8/2023 Denisse Beltrán

## 2023-02-10 RX ORDER — AMOXICILLIN AND CLAVULANATE POTASSIUM 875; 125 MG/1; MG/1
1 TABLET, FILM COATED ORAL 2 TIMES DAILY
Qty: 14 TABLET | Refills: 0 | Status: SHIPPED | OUTPATIENT
Start: 2023-02-10 | End: 2023-05-17

## 2023-02-10 NOTE — TELEPHONE ENCOUNTER
----- Message from Dilia Castaneda sent at 2/10/2023  8:42 AM CST -----  Pt called stating that she is taking Cipro and she is having bad headaches itching and kind of dizzy. And would like to speak to a nurse. [phone]

## 2023-02-10 NOTE — TELEPHONE ENCOUNTER
Spoke with pt in regards to messages sent. Verbalized per Manjinder that the pt can stop Cipro and we can send in Augmentin 875 b.i.d. for 7 days.  Manjinder did review Denisse's note and does sound consistent with diverticulitis. Verbalized per Manjinder that if the pt's symptoms drastically worsened over the weekend she should go to the ER for evaluation. Pt acknowledge understanding.

## 2023-02-10 NOTE — TELEPHONE ENCOUNTER
Spoke with patient who states she is being treated for diverticulitis by Denisse. She was given Cipro and ever since she started taking this she has had a bad headache, itching, and overall not feeling well. She would like to stop taking the cipro but be given something else for this.

## 2023-02-10 NOTE — TELEPHONE ENCOUNTER
Okay to stop Cipro and we can send in Augmentin 875 b.i.d. for 7 days.  I did review Denisse is note and does sound consistent with diverticulitis.  Should her symptoms drastically worsened over the weekend she should go to the ER for evaluation

## 2023-02-15 LAB
ALBUMIN SERPL-MCNC: 4 G/DL (ref 3.6–5.1)
ALBUMIN/GLOB SERPL: 1.7 (CALC) (ref 1–2.5)
ALP SERPL-CCNC: 63 U/L (ref 37–153)
ALT SERPL-CCNC: 30 U/L (ref 6–29)
AST SERPL-CCNC: 32 U/L (ref 10–35)
BASOPHILS # BLD AUTO: 43 CELLS/UL (ref 0–200)
BASOPHILS NFR BLD AUTO: 0.6 %
BILIRUB SERPL-MCNC: 0.6 MG/DL (ref 0.2–1.2)
BUN SERPL-MCNC: 18 MG/DL (ref 7–25)
BUN/CREAT SERPL: 14 (CALC) (ref 6–22)
CALCIUM SERPL-MCNC: 8.9 MG/DL (ref 8.6–10.4)
CHLORIDE SERPL-SCNC: 103 MMOL/L (ref 98–110)
CHOLEST SERPL-MCNC: 116 MG/DL
CHOLEST/HDLC SERPL: 2 (CALC)
CO2 SERPL-SCNC: 26 MMOL/L (ref 20–32)
CREAT SERPL-MCNC: 1.26 MG/DL (ref 0.6–0.95)
EGFR: 41 ML/MIN/1.73M2
EOSINOPHIL # BLD AUTO: 58 CELLS/UL (ref 15–500)
EOSINOPHIL NFR BLD AUTO: 0.8 %
ERYTHROCYTE [DISTWIDTH] IN BLOOD BY AUTOMATED COUNT: 12.1 % (ref 11–15)
GLOBULIN SER CALC-MCNC: 2.3 G/DL (CALC) (ref 1.9–3.7)
GLUCOSE SERPL-MCNC: 104 MG/DL (ref 65–99)
HCT VFR BLD AUTO: 38.4 % (ref 35–45)
HDLC SERPL-MCNC: 59 MG/DL
HGB BLD-MCNC: 12.6 G/DL (ref 11.7–15.5)
LDLC SERPL CALC-MCNC: 44 MG/DL (CALC)
LYMPHOCYTES # BLD AUTO: 1310 CELLS/UL (ref 850–3900)
LYMPHOCYTES NFR BLD AUTO: 18.2 %
MCH RBC QN AUTO: 29.4 PG (ref 27–33)
MCHC RBC AUTO-ENTMCNC: 32.8 G/DL (ref 32–36)
MCV RBC AUTO: 89.5 FL (ref 80–100)
MONOCYTES # BLD AUTO: 641 CELLS/UL (ref 200–950)
MONOCYTES NFR BLD AUTO: 8.9 %
NEUTROPHILS # BLD AUTO: 5148 CELLS/UL (ref 1500–7800)
NEUTROPHILS NFR BLD AUTO: 71.5 %
NONHDLC SERPL-MCNC: 57 MG/DL (CALC)
PLATELET # BLD AUTO: 276 THOUSAND/UL (ref 140–400)
PMV BLD REES-ECKER: 10.1 FL (ref 7.5–12.5)
POTASSIUM SERPL-SCNC: 4.4 MMOL/L (ref 3.5–5.3)
PROT SERPL-MCNC: 6.3 G/DL (ref 6.1–8.1)
RBC # BLD AUTO: 4.29 MILLION/UL (ref 3.8–5.1)
SODIUM SERPL-SCNC: 137 MMOL/L (ref 135–146)
TRIGL SERPL-MCNC: 54 MG/DL
TSH SERPL-ACNC: 1.36 MIU/L (ref 0.4–4.5)
WBC # BLD AUTO: 7.2 THOUSAND/UL (ref 3.8–10.8)

## 2023-02-17 LAB
ALBUMIN/CREAT UR: NORMAL MCG/MG CREAT
APPEARANCE UR: CLEAR
BACTERIA #/AREA URNS HPF: NORMAL /HPF
BACTERIA UR CULT: NORMAL
BILIRUB UR QL STRIP: NEGATIVE
COLOR UR: YELLOW
CREAT UR-MCNC: 31 MG/DL (ref 20–275)
GLUCOSE UR QL STRIP: NEGATIVE
HGB UR QL STRIP: NEGATIVE
HYALINE CASTS #/AREA URNS LPF: NORMAL /LPF
KETONES UR QL STRIP: NEGATIVE
LEUKOCYTE ESTERASE UR QL STRIP: NEGATIVE
MICROALBUMIN UR-MCNC: <0.2 MG/DL
NITRITE UR QL STRIP: NEGATIVE
PH UR STRIP: 6.5 [PH] (ref 5–8)
PROT UR QL STRIP: NEGATIVE
RBC #/AREA URNS HPF: NORMAL /HPF
SERVICE CMNT-IMP: NORMAL
SP GR UR STRIP: 1.01 (ref 1–1.03)
SQUAMOUS #/AREA URNS HPF: NORMAL /HPF
WBC #/AREA URNS HPF: NORMAL /HPF

## 2023-02-28 ENCOUNTER — TELEPHONE (OUTPATIENT)
Dept: FAMILY MEDICINE | Facility: CLINIC | Age: 88
End: 2023-02-28

## 2023-02-28 ENCOUNTER — NURSE TRIAGE (OUTPATIENT)
Dept: ADMINISTRATIVE | Facility: CLINIC | Age: 88
End: 2023-02-28
Payer: MEDICARE

## 2023-02-28 NOTE — TELEPHONE ENCOUNTER
Received a notice from on call that the patient was having trouble breathing earlier - I tried to call but did not leave a message. Just called again and LMOR for patient to call back asap. They advised her to go to the ER but the patient refused.

## 2023-02-28 NOTE — TELEPHONE ENCOUNTER
Spoke with Clare Blanco MA (staff) via secure chat who stated there office has tried calling patient multiple times and voicemail was left for a return call to their office.

## 2023-02-28 NOTE — TELEPHONE ENCOUNTER
Spoke with patient states her B/P has been elevated this morning.  B/P reading was 185/88 @ 8:30 am.  Patient states she took a dose a hydralazine shortly after.  Current B/P is reading 140/75.  Patient states she has a headache and feels like she is taking shorter breaths.  Patient states her breathing is shallow and slow.  She states she noticed the change in her breathing this morning.  Patient states her balance is not that good.  She ambulates with a cane but states she has to be very careful not to fall.  Per protocol advised patient to call [id number].   Patient declined advice.  Further advised that she sera [id number] and patient further declined.  Message sent to PCP and office staff.   Reason for Disposition   Patient sounds very sick or weak to the triager   Patient sounds very sick or weak to the triager   Slow, shallow and weak breathing    Additional Information   Negative: Sounds like a life-threatening emergency to the triager   Negative: Systolic BP >= 160 OR Diastolic >= 100, and any cardiac or neurologic symptoms (e.g., chest pain, difficulty breathing, unsteady gait, blurred vision)   Negative: Pregnant 20 or more weeks (or postpartum < 6 weeks) with new hand or face swelling   Negative: Pregnant 20 or more weeks (or postpartum < 6 weeks) and Systolic BP >= 160 OR Diastolic >= 100   Negative: SEVERE headache, states 'worst headache' of life   Negative: SEVERE headache, sudden-onset (i.e., reaching maximum intensity within seconds to 1 hour)   Negative: Difficult to awaken or acting confused (e.g., disoriented, slurred speech)   Negative: Weakness of the face, arm or leg on one side of the body and new-onset   Negative: Numbness of the face, arm or leg on one side of the body and new-onset   Negative: Loss of speech or garbled speech and new-onset   Negative: Passed out (i.e., fainted, collapsed and was not responding)   Negative: Sounds like a life-threatening emergency to the triager   Negative:  Unable to walk without falling   Negative: Stiff neck (can't touch chin to chest)   Negative: Possibility of carbon monoxide exposure   Negative: Severe pain in one eye   Negative: Loss of vision or double vision  (Exception: Same as prior migraines.)   Negative: SEVERE difficulty breathing (e.g., struggling for each breath, speaks in single words, pulse > 120)   Negative: Breathing stopped and hasn't returned   Negative: Choking on something   Negative: Bluish (or gray) lips or face   Negative: Difficult to awaken or acting confused (e.g., disoriented, slurred speech)   Negative: Passed out (i.e., fainted, collapsed and was not responding)   Negative: Wheezing started suddenly after medicine, an allergic food, or bee sting   Negative: Stridor    Protocols used: Blood Pressure - High-A-OH, Headache-A-OH, Breathing Difficulty-A-OH

## 2023-02-28 NOTE — TELEPHONE ENCOUNTER
"Spoke with patient who states that the "trouble breathing" is "nothing out of the ordinary and is okay now." Her blood pressure was elevated this morning in the 185/88, she took a Hydralazine and it has come down now to 150/79. She still has a slight headache. She took a Tylenol extra strength at 1pm, she is going to continue monitoring her blood pressure and call if she has any issues with it again. She reports no shortness of breath or labored breathing even though she told the nurse earlier she was. She states it was explained wrong and this is nothing new and "not concerning." She was only worried about the bp and headache which are both getting better now. KATIE sent to Denisse.   "

## 2023-02-28 NOTE — TELEPHONE ENCOUNTER
----- Message from Arelis Geller sent at 2/28/2023  2:25 PM CST -----  VM 02/28/23 @ 1:28 PM : patient called and stated that she was calling back she had a miss call from the office please give her a call at [phone]

## 2023-03-01 RX ORDER — LOSARTAN POTASSIUM 50 MG/1
50 TABLET ORAL NIGHTLY
Qty: 90 TABLET | Refills: 3 | Status: SHIPPED | OUTPATIENT
Start: 2023-03-01 | End: 2023-05-17

## 2023-03-01 NOTE — TELEPHONE ENCOUNTER
----- Message from Brenda Laughlin sent at 3/1/2023 10:42 AM CST -----  Contact: Self  Patient is calling in regards to her refill request for losartan (COZAAR) 50 MG tablet. Stated the pharmacy requested the refill and has not gotten a response. If we can please check on this and have it sent to:    [id number]  [address] - [address]  [place] [address]  Phone: [phone] Fax: [phone]    Please look into this for pt and call back to confirm at [phone]. Thank You

## 2023-03-13 ENCOUNTER — TELEPHONE (OUTPATIENT)
Dept: FAMILY MEDICINE | Facility: CLINIC | Age: 88
End: 2023-03-13

## 2023-03-13 NOTE — TELEPHONE ENCOUNTER
----- Message from Berlin Nayak sent at 3/13/2023  5:05 PM CDT -----  -Pt called at 4:29pm and said she would like to make an appt for a f/u from a uti next week around [facility] if she could get it.  [phone]

## 2023-03-14 ENCOUNTER — TELEPHONE (OUTPATIENT)
Dept: FAMILY MEDICINE | Facility: CLINIC | Age: 88
End: 2023-03-14

## 2023-03-14 NOTE — TELEPHONE ENCOUNTER
Spoke with patient who states she is in [place] visiting her daughter and she has a UTI. States she would like to be seen next week.   States she was placed on one abx and was no better, then they just switched her abx. She is on day 2 and starting to feel better but she wasn't sure if she should come here first. I let her know to give us a call if she is still having any sx after this abx

## 2023-03-14 NOTE — TELEPHONE ENCOUNTER
----- Message from Imani Phelps sent at 3/14/2023  1:57 PM CDT -----  - pt is returning a call from yesterday   [phone]

## 2023-04-04 DIAGNOSIS — Z95.0 PACEMAKER: Primary | ICD-10-CM

## 2023-04-18 ENCOUNTER — TELEPHONE (OUTPATIENT)
Dept: CARDIOLOGY | Facility: CLINIC | Age: 88
End: 2023-04-18
Payer: MEDICARE

## 2023-04-18 NOTE — TELEPHONE ENCOUNTER
----- Message from Ellen Dumas sent at 4/18/2023 10:59 AM CDT -----  Type: Needs Medical Advice  Who Called:  pt  Symptoms (please be specific):  pt said she would like orders for blood work before she see the dr on 4/26--please call and advise    Best Call Back Number: [phone] (home)     Additional Information: thank you

## 2023-05-16 ENCOUNTER — TELEPHONE (OUTPATIENT)
Dept: FAMILY MEDICINE | Facility: CLINIC | Age: 88
End: 2023-05-16

## 2023-05-16 ENCOUNTER — HOSPITAL ENCOUNTER (EMERGENCY)
Facility: HOSPITAL | Age: 88
Discharge: LEFT BEFORE DISCHARGE | End: 2023-05-17
Payer: MEDICARE

## 2023-05-16 DIAGNOSIS — R07.9 CHEST PAIN: ICD-10-CM

## 2023-05-16 LAB
ALBUMIN SERPL BCP-MCNC: 3.6 G/DL (ref 3.5–5.2)
ALP SERPL-CCNC: 58 U/L (ref 55–135)
ALT SERPL W/O P-5'-P-CCNC: 14 U/L (ref 10–44)
ANION GAP SERPL CALC-SCNC: 9 MMOL/L (ref 8–16)
AST SERPL-CCNC: 18 U/L (ref 10–40)
BASOPHILS # BLD AUTO: 0.03 K/UL (ref 0–0.2)
BASOPHILS NFR BLD: 0.5 % (ref 0–1.9)
BILIRUB SERPL-MCNC: 0.9 MG/DL (ref 0.1–1)
BNP SERPL-MCNC: 284 PG/ML (ref 0–99)
BUN SERPL-MCNC: 24 MG/DL (ref 8–23)
CALCIUM SERPL-MCNC: 8.4 MG/DL (ref 8.7–10.5)
CHLORIDE SERPL-SCNC: 97 MMOL/L (ref 95–110)
CO2 SERPL-SCNC: 23 MMOL/L (ref 23–29)
CREAT SERPL-MCNC: 1.1 MG/DL (ref 0.5–1.4)
DIFFERENTIAL METHOD: ABNORMAL
EOSINOPHIL # BLD AUTO: 0.1 K/UL (ref 0–0.5)
EOSINOPHIL NFR BLD: 1.4 % (ref 0–8)
ERYTHROCYTE [DISTWIDTH] IN BLOOD BY AUTOMATED COUNT: 13.3 % (ref 11.5–14.5)
EST. GFR  (NO RACE VARIABLE): 48 ML/MIN/1.73 M^2
GLUCOSE SERPL-MCNC: 105 MG/DL (ref 70–110)
HCT VFR BLD AUTO: 34.1 % (ref 37–48.5)
HGB BLD-MCNC: 11.4 G/DL (ref 12–16)
IMM GRANULOCYTES # BLD AUTO: 0.02 K/UL (ref 0–0.04)
IMM GRANULOCYTES NFR BLD AUTO: 0.3 % (ref 0–0.5)
INR PPP: 1 (ref 0.8–1.2)
LYMPHOCYTES # BLD AUTO: 1.6 K/UL (ref 1–4.8)
LYMPHOCYTES NFR BLD: 26.6 % (ref 18–48)
MCH RBC QN AUTO: 29.6 PG (ref 27–31)
MCHC RBC AUTO-ENTMCNC: 33.4 G/DL (ref 32–36)
MCV RBC AUTO: 89 FL (ref 82–98)
MONOCYTES # BLD AUTO: 0.6 K/UL (ref 0.3–1)
MONOCYTES NFR BLD: 10.8 % (ref 4–15)
NEUTROPHILS # BLD AUTO: 3.5 K/UL (ref 1.8–7.7)
NEUTROPHILS NFR BLD: 60.4 % (ref 38–73)
NRBC BLD-RTO: 0 /100 WBC
PLATELET # BLD AUTO: 210 K/UL (ref 150–450)
PMV BLD AUTO: 9.8 FL (ref 9.2–12.9)
POTASSIUM SERPL-SCNC: 4.4 MMOL/L (ref 3.5–5.1)
PROT SERPL-MCNC: 6.7 G/DL (ref 6–8.4)
PROTHROMBIN TIME: 10.6 SEC (ref 9–12.5)
RBC # BLD AUTO: 3.85 M/UL (ref 4–5.4)
SODIUM SERPL-SCNC: 129 MMOL/L (ref 136–145)
TROPONIN I SERPL HS-MCNC: 7.2 PG/ML (ref 0–14.9)
WBC # BLD AUTO: 5.83 K/UL (ref 3.9–12.7)

## 2023-05-16 PROCEDURE — 83880 ASSAY OF NATRIURETIC PEPTIDE: CPT

## 2023-05-16 PROCEDURE — 93005 ELECTROCARDIOGRAM TRACING: CPT | Performed by: INTERNAL MEDICINE

## 2023-05-16 PROCEDURE — 93010 ELECTROCARDIOGRAM REPORT: CPT | Mod: ,,, | Performed by: INTERNAL MEDICINE

## 2023-05-16 PROCEDURE — 85610 PROTHROMBIN TIME: CPT

## 2023-05-16 PROCEDURE — 80053 COMPREHEN METABOLIC PANEL: CPT

## 2023-05-16 PROCEDURE — 84484 ASSAY OF TROPONIN QUANT: CPT

## 2023-05-16 PROCEDURE — 93010 EKG 12-LEAD: ICD-10-PCS | Mod: ,,, | Performed by: INTERNAL MEDICINE

## 2023-05-16 PROCEDURE — 99999 HC NO LEVEL OF SERVICE - ED ONLY: CPT

## 2023-05-16 PROCEDURE — 85025 COMPLETE CBC W/AUTO DIFF WBC: CPT

## 2023-05-16 NOTE — TELEPHONE ENCOUNTER
"Spoke with patient, states she has a lump on the lower part of her abdomen that is very sensitive. States it's not very big but described it was a "protrusion." States it comes and goes but the pain was very bad yesterday morning. States she thinks it's a piece of her intestines. States it's kind of just "been there" but this is the first time it has ever given her pain. She would like to be seen to evaluate what is going on. Denies all other symptoms.   "

## 2023-05-16 NOTE — TELEPHONE ENCOUNTER
----- Message from Dilia Diallo MA sent at 5/16/2023 10:24 AM CDT -----  Regarding: hard lump  on lower abdomen  Patient concerned about hard lump on lower abdomen, sometimes sensitive and will not move around would like to be seen asap  [phone]

## 2023-05-17 ENCOUNTER — OFFICE VISIT (OUTPATIENT)
Dept: FAMILY MEDICINE | Facility: CLINIC | Age: 88
End: 2023-05-17
Payer: MEDICARE

## 2023-05-17 ENCOUNTER — TELEPHONE (OUTPATIENT)
Dept: CARDIOLOGY | Facility: CLINIC | Age: 88
End: 2023-05-17
Payer: MEDICARE

## 2023-05-17 VITALS
HEIGHT: 62 IN | DIASTOLIC BLOOD PRESSURE: 70 MMHG | OXYGEN SATURATION: 95 % | SYSTOLIC BLOOD PRESSURE: 144 MMHG | WEIGHT: 137.38 LBS | BODY MASS INDEX: 25.28 KG/M2 | HEART RATE: 89 BPM

## 2023-05-17 VITALS
DIASTOLIC BLOOD PRESSURE: 81 MMHG | OXYGEN SATURATION: 97 % | RESPIRATION RATE: 11 BRPM | HEIGHT: 62 IN | BODY MASS INDEX: 23.93 KG/M2 | WEIGHT: 130.06 LBS | HEART RATE: 68 BPM | TEMPERATURE: 98 F | SYSTOLIC BLOOD PRESSURE: 184 MMHG

## 2023-05-17 DIAGNOSIS — I25.10 CORONARY ARTERY DISEASE INVOLVING NATIVE CORONARY ARTERY OF NATIVE HEART WITHOUT ANGINA PECTORIS: ICD-10-CM

## 2023-05-17 DIAGNOSIS — Z79.899 HIGH RISK MEDICATION USE: ICD-10-CM

## 2023-05-17 DIAGNOSIS — R14.3 FLATULENCE: ICD-10-CM

## 2023-05-17 DIAGNOSIS — G47.00 INSOMNIA, UNSPECIFIED TYPE: ICD-10-CM

## 2023-05-17 DIAGNOSIS — I10 ESSENTIAL (PRIMARY) HYPERTENSION: Primary | ICD-10-CM

## 2023-05-17 DIAGNOSIS — Z95.0 PACEMAKER: ICD-10-CM

## 2023-05-17 LAB — TROPONIN I SERPL HS-MCNC: 7.6 PG/ML (ref 0–14.9)

## 2023-05-17 PROCEDURE — 84484 ASSAY OF TROPONIN QUANT: CPT

## 2023-05-17 PROCEDURE — 99214 PR OFFICE/OUTPT VISIT, EST, LEVL IV, 30-39 MIN: ICD-10-PCS | Mod: S$GLB,,, | Performed by: NURSE PRACTITIONER

## 2023-05-17 PROCEDURE — 99214 OFFICE O/P EST MOD 30 MIN: CPT | Mod: S$GLB,,, | Performed by: NURSE PRACTITIONER

## 2023-05-17 RX ORDER — AMLODIPINE BESYLATE 2.5 MG/1
2.5 TABLET ORAL 2 TIMES DAILY
Qty: 60 TABLET | Refills: 3
Start: 2023-05-17 | End: 2023-05-24 | Stop reason: SDUPTHER

## 2023-05-17 RX ORDER — ACETAMINOPHEN 500 MG
1000 TABLET ORAL
Status: DISCONTINUED | OUTPATIENT
Start: 2023-05-17 | End: 2023-05-17 | Stop reason: HOSPADM

## 2023-05-17 NOTE — PROGRESS NOTES
SUBJECTIVE:    Patient ID: Vee Cadena is a 89 y.o. female.    Chief Complaint: Hernia (No bottles/ possible hernia on left side and elevated bp/ ER f/u from yesterday/[facility])    89 year old female presents for urgent visit.   Living alone. Treated for htn, and insomnia.  followedby dr. Jett chinchilla. Daughter is present during exam. Reports that went to er last night because blood pressure was so high. Waited in er for 9 hours. Was not seen by provider. Ended up leaving and taking norvasc when getting home. Today bp is elevated but not as high as reporting. Is followed by [facility]. Was recently taken off of norvasc. Told to hold until appointment with dr. Chinchilla in may 11. No chest pain. Nosob. Does have some swelling some days. Since stopping bp has been all over the place.   Concerned that may have a hernia. Noticed several days ago      Admission on 05/16/2023, Discharged on 05/17/2023   Component Date Value Ref Range Status    WBC 05/16/2023 5.83  3.90 - 12.70 K/uL Final    RBC 05/16/2023 3.85 (L)  4.00 - 5.40 M/uL Final    Hemoglobin 05/16/2023 11.4 (L)  12.0 - 16.0 g/dL Final    Hematocrit 05/16/2023 34.1 (L)  37.0 - 48.5 % Final    MCV 05/16/2023 89  82 - 98 fL Final    MCH 05/16/2023 29.6  27.0 - 31.0 pg Final    MCHC 05/16/2023 33.4  32.0 - 36.0 g/dL Final    RDW 05/16/2023 13.3  11.5 - 14.5 % Final    Platelets 05/16/2023 210  150 - 450 K/uL Final    MPV 05/16/2023 9.8  9.2 - 12.9 fL Final    Immature Granulocytes 05/16/2023 0.3  0.0 - 0.5 % Final    Gran # (ANC) 05/16/2023 3.5  1.8 - 7.7 K/uL Final    Immature Grans (Abs) 05/16/2023 0.02  0.00 - 0.04 K/uL Final    Lymph # 05/16/2023 1.6  1.0 - 4.8 K/uL Final    Mono # 05/16/2023 0.6  0.3 - 1.0 K/uL Final    Eos # 05/16/2023 0.1  0.0 - 0.5 K/uL Final    Baso # 05/16/2023 0.03  0.00 - 0.20 K/uL Final    nRBC 05/16/2023 0  0 /100 WBC Final    Gran % 05/16/2023 60.4  38.0 - 73.0 % Final    Lymph % 05/16/2023 26.6  18.0 - 48.0 % Final    Mono % 05/16/2023  10.8  4.0 - 15.0 % Final    Eosinophil % 05/16/2023 1.4  0.0 - 8.0 % Final    Basophil % 05/16/2023 0.5  0.0 - 1.9 % Final    Differential Method 05/16/2023 Automated   Final    Sodium 05/16/2023 129 (L)  136 - 145 mmol/L Final    Potassium 05/16/2023 4.4  3.5 - 5.1 mmol/L Final    Chloride 05/16/2023 97  95 - 110 mmol/L Final    CO2 05/16/2023 23  23 - 29 mmol/L Final    Glucose 05/16/2023 105  70 - 110 mg/dL Final    BUN 05/16/2023 24 (H)  8 - 23 mg/dL Final    Creatinine 05/16/2023 1.1  0.5 - 1.4 mg/dL Final    Calcium 05/16/2023 8.4 (L)  8.7 - 10.5 mg/dL Final    Total Protein 05/16/2023 6.7  6.0 - 8.4 g/dL Final    Albumin 05/16/2023 3.6  3.5 - 5.2 g/dL Final    Total Bilirubin 05/16/2023 0.9  0.1 - 1.0 mg/dL Final    Alkaline Phosphatase 05/16/2023 58  55 - 135 U/L Final    AST 05/16/2023 18  10 - 40 U/L Final    ALT 05/16/2023 14  10 - 44 U/L Final    Anion Gap 05/16/2023 9  8 - 16 mmol/L Final    eGFR 05/16/2023 48.0 (A)  >60 mL/min/1.73 m^2 Final    BNP 05/16/2023 284 (H)  0 - 99 pg/mL Final    Troponin I High Sensitivity 05/16/2023 7.2  0.0 - 14.9 pg/mL Final    Prothrombin Time 05/16/2023 10.6  9.0 - 12.5 sec Final    INR 05/16/2023 1.0  0.8 - 1.2 Final    Troponin I High Sensitivity 05/17/2023 7.6  0.0 - 14.9 pg/mL Final   Hospital Outpatient Visit on 04/04/2023   Component Date Value Ref Range Status    Battery Voltage (V) 04/04/2023 3.00  V Final    P/R-wave RA Lead 04/04/2023 1.6  mV Final    P/R-wave RA Lead (native) 04/04/2023 5.4  mV Final    Impedance RA Lead 04/04/2023 437  Ohms Final    Impedance RA Lead (native) 04/04/2023 437  Ohms Final    Threshold V RA Lead 04/04/2023 0.5  V Final    Theshold ms RA Lead 04/04/2023 0.4  ms Final    Threshold V RA Lead (native) 04/04/2023 0.625  V Final    Threshold ms RA Lead (native) 04/04/2023 0.4  ms Final   Office Visit on 02/08/2023   Component Date Value Ref Range Status    WBC 02/14/2023 7.2  3.8 - 10.8 Thousand/uL Final    RBC 02/14/2023 4.29  3.80 -  5.10 Million/uL Final    Hemoglobin 02/14/2023 12.6  11.7 - 15.5 g/dL Final    Hematocrit 02/14/2023 38.4  35.0 - 45.0 % Final    MCV 02/14/2023 89.5  80.0 - 100.0 fL Final    MCH 02/14/2023 29.4  27.0 - 33.0 pg Final    MCHC 02/14/2023 32.8  32.0 - 36.0 g/dL Final    RDW 02/14/2023 12.1  11.0 - 15.0 % Final    Platelets 02/14/2023 276  140 - 400 Thousand/uL Final    MPV 02/14/2023 10.1  7.5 - 12.5 fL Final    Neutrophils, Abs 02/14/2023 5,148  1,500 - 7,800 cells/uL Final    Lymph # 02/14/2023 1,310  850 - 3,900 cells/uL Final    Mono # 02/14/2023 641  200 - 950 cells/uL Final    Eos # 02/14/2023 58  15 - 500 cells/uL Final    Baso # 02/14/2023 43  0 - 200 cells/uL Final    Neutrophils Relative 02/14/2023 71.5  % Final    Lymph % 02/14/2023 18.2  % Final    Mono % 02/14/2023 8.9  % Final    Eosinophil % 02/14/2023 0.8  % Final    Basophil % 02/14/2023 0.6  % Final    Glucose 02/14/2023 104 (H)  65 - 99 mg/dL Final    BUN 02/14/2023 18  7 - 25 mg/dL Final    Creatinine 02/14/2023 1.26 (H)  0.60 - 0.95 mg/dL Final    eGFR 02/14/2023 41 (L)  > OR = 60 mL/min/1.73m2 Final    BUN/Creatinine Ratio 02/14/2023 14  6 - 22 (calc) Final    Sodium 02/14/2023 137  135 - 146 mmol/L Final    Potassium 02/14/2023 4.4  3.5 - 5.3 mmol/L Final    Chloride 02/14/2023 103  98 - 110 mmol/L Final    CO2 02/14/2023 26  20 - 32 mmol/L Final    Calcium 02/14/2023 8.9  8.6 - 10.4 mg/dL Final    Total Protein 02/14/2023 6.3  6.1 - 8.1 g/dL Final    Albumin 02/14/2023 4.0  3.6 - 5.1 g/dL Final    Globulin, Total 02/14/2023 2.3  1.9 - 3.7 g/dL (calc) Final    Albumin/Globulin Ratio 02/14/2023 1.7  1.0 - 2.5 (calc) Final    Total Bilirubin 02/14/2023 0.6  0.2 - 1.2 mg/dL Final    Alkaline Phosphatase 02/14/2023 63  37 - 153 U/L Final    AST 02/14/2023 32  10 - 35 U/L Final    ALT 02/14/2023 30 (H)  6 - 29 U/L Final    Cholesterol 02/14/2023 116  <200 mg/dL Final    HDL 02/14/2023 59  > OR = 50 mg/dL Final    Triglycerides 02/14/2023 54  <150  mg/dL Final    LDL Cholesterol 02/14/2023 44  mg/dL (calc) Final    HDL/Cholesterol Ratio 02/14/2023 2.0  <5.0 (calc) Final    Non HDL Chol. (LDL+VLDL) 02/14/2023 57  <130 mg/dL (calc) Final    TSH w/reflex to FT4 02/14/2023 1.36  0.40 - 4.50 mIU/L Final    Creatinine, Urine 02/16/2023 31  20 - 275 mg/dL Final    Microalb, Ur 02/16/2023 <0.2  See Note: mg/dL Final    Microalb/Creat Ratio 02/16/2023 NOTE  <30 mcg/mg creat Final    Color, UA 02/16/2023 YELLOW  YELLOW Final    Appearance, UA 02/16/2023 CLEAR  CLEAR Final    Specific Gravity, UA 02/16/2023 1.006  1.001 - 1.035 Final    pH, UA 02/16/2023 6.5  5.0 - 8.0 Final    Glucose, UA 02/16/2023 NEGATIVE  NEGATIVE Final    Bilirubin, UA 02/16/2023 NEGATIVE  NEGATIVE Final    Ketones, UA 02/16/2023 NEGATIVE  NEGATIVE Final    Occult Blood UA 02/16/2023 NEGATIVE  NEGATIVE Final    Protein, UA 02/16/2023 NEGATIVE  NEGATIVE Final    Nitrite, UA 02/16/2023 NEGATIVE  NEGATIVE Final    Leukocytes, UA 02/16/2023 NEGATIVE  NEGATIVE Final    WBC Casts, UA 02/16/2023 NONE SEEN  < OR = 5 /HPF Final    RBC Casts, UA 02/16/2023 NONE SEEN  < OR = 2 /HPF Final    Squam Epithel, UA 02/16/2023 NONE SEEN  < OR = 5 /HPF Final    Bacteria, UA 02/16/2023 NONE SEEN  NONE SEEN /HPF Final    Hyaline Casts, UA 02/16/2023 NONE SEEN  NONE SEEN /LPF Final    Service Cmt: 02/16/2023    Final    Reflexive Urine Culture 02/16/2023    Final       Past Medical History:   Diagnosis Date    Back ache     Back pain     Carotid bruit     Cervical disc disease     Chest pain     Coronary artery disease     Diffuse cystic mastopathy     Elevated blood pressure     Encounter for blood transfusion     Foot pain, left     Generalized osteoarthrosis     Herpes zoster without complications     Hypertension     Neck pain     Neuropathy     Osteoporosis     Palpitations     PONV (postoperative nausea and vomiting)     TIA (transient ischemic attack)      Social History     Socioeconomic History    Marital  status:     Number of children: 6   Tobacco Use    Smoking status: Never    Smokeless tobacco: Never   Substance and Sexual Activity    Alcohol use: Not Currently     Comment: socially if ever   Social History Narrative    Sleeps ok. Takes melatonin 5mg . About 7hrs    2 days per week     Walks 3-4 days per week     Past Surgical History:   Procedure Laterality Date    APPENDECTOMY      bladder and colon lift and retached      CARDIAC CATHETERIZATION      CATARACT EXTRACTION  02/23/2009    CHOLECYSTECTOMY      COLONOSCOPY Left 9/6/2021    Procedure: COLONOSCOPY;  Surgeon: Erlin Lainez MD;  Location: [facility] ENDO;  Service: Endoscopy;  Laterality: Left;    ESOPHAGOGASTRODUODENOSCOPY Left 9/6/2021    Procedure: EGD (ESOPHAGOGASTRODUODENOSCOPY);  Surgeon: Erlin Lainez MD;  Location: [facility] ENDO;  Service: Endoscopy;  Laterality: Left;    INSERTION OF PACEMAKER  12/2019    pt reported    TOTAL ABDOMINAL HYSTERECTOMY      TUBAL LIGATION       Family History   Problem Relation Age of Onset    Heart disease Father     Thyroid disease Father     Diabetes Father     Stroke Father     Diabetes Brother     Hypertension Brother     Hyperlipidemia Brother     Heart disease Brother     Cancer Brother         lung    Hypertension Sister     Diabetes Sister     Heart disease Sister     Hyperlipidemia Sister     Dementia Mother     Down syndrome Daughter     COPD Daughter        Review of patient's allergies indicates:   Allergen Reactions    Demerol [meperidine]      restless       Current Outpatient Medications:     acetaminophen (TYLENOL) 500 MG tablet, Take 500 mg by mouth as needed for Pain. PRN, Disp: , Rfl:     cholecalciferol, vitamin D3, (VITAMIN D3) 25 mcg (1,000 unit) capsule, Take 1,000 Units by mouth once daily., Disp: , Rfl:     ciprofloxacin HCl (CIPRO) 500 MG tablet, Take 1 tablet (500 mg total) by mouth every 12 (twelve) hours., Disp: 20 tablet, Rfl: 0    cyanocobalamin 500 MCG tablet, Take 500 mcg  by mouth once daily., Disp: , Rfl:     furosemide (LASIX) 20 MG tablet, Take 1 tablet (20 mg total) by mouth once daily. (Patient not taking: Reported on 1/24/2023), Disp: 30 tablet, Rfl: 3    hydrALAZINE (APRESOLINE) 10 MG tablet, Take 1 tablet (10 mg total) by mouth daily as needed (TAKE 1 TAB PO PRN EVERY 2 HOURS FOR SBP>180 OR DBP>100)., Disp: 30 tablet, Rfl: 11    losartan (COZAAR) 50 MG tablet, Take 1 tablet (50 mg total) by mouth every evening., Disp: 90 tablet, Rfl: 3    Saccharomyces boulardii (FLORASTOR) 250 mg capsule, Take 1 capsule (250 mg total) by mouth 2 (two) times daily., Disp: 60 capsule, Rfl: 1    simethicone (MYLICON) 125 MG chewable tablet, Take 1 tablet (125 mg total) by mouth every 6 (six) hours as needed for Flatulence., Disp: 60 tablet, Rfl: 3    UNABLE TO FIND, Apply 3 Pump topically 5 (five) times daily. Ketoprofen/Cyclobenzaprine/Lidocaine Hcl (lipomax) 10/2/5%  APPLY UP TO 4.8 GRAMS (3 PUMPS) TO PAINFUL AREAS UP TO FIVE TIMES DAILY. RUB IN WELL, Disp: , Rfl:     vitamin E 400 UNIT capsule, Take 400 Units by mouth once daily., Disp: , Rfl:     zolpidem (AMBIEN) 5 MG Tab, Take 1/2 tablet nightly prn (Patient taking differently: Take 2.5 mg by mouth nightly as needed. Take 1/2 tablet nightly prn), Disp: 45 tablet, Rfl: 1  No current facility-administered medications for this visit.    Review of Systems   Constitutional:  Negative for chills, fever and unexpected weight change.   HENT:  Negative for ear pain, rhinorrhea and sore throat.    Eyes:  Negative for pain and visual disturbance.   Respiratory:  Negative for cough and shortness of breath.    Cardiovascular:  Negative for chest pain, palpitations and leg swelling.   Gastrointestinal:  Positive for abdominal pain. Negative for diarrhea, nausea and vomiting.   Genitourinary:  Negative for difficulty urinating, hematuria and vaginal bleeding.   Musculoskeletal:  Negative for arthralgias.   Skin:  Negative for rash.   Neurological:   "Negative for dizziness, weakness and headaches.   Psychiatric/Behavioral:  Negative for agitation and sleep disturbance. The patient is not nervous/anxious.         Objective:      Vitals:    05/17/23 1005 05/17/23 1010   BP: (!) 140/62 (!) 144/70   Pulse: 89    SpO2: 95%    Weight: 62.3 kg (137 lb 6.4 oz)    Height: 5' 2" (1.575 m)      Physical Exam  Vitals and nursing note reviewed.   Constitutional:       General: She is not in acute distress.     Appearance: Normal appearance. She is well-developed.   HENT:      Right Ear: External ear normal.      Left Ear: External ear normal.   Eyes:      Conjunctiva/sclera: Conjunctivae normal.      Pupils: Pupils are equal, round, and reactive to light.   Neck:      Vascular: No JVD.   Cardiovascular:      Rate and Rhythm: Normal rate and regular rhythm.      Heart sounds: No murmur heard.  Pulmonary:      Effort: Pulmonary effort is normal.      Breath sounds: Normal breath sounds.   Abdominal:      General: Bowel sounds are normal.      Palpations: Abdomen is soft.      Tenderness: There is abdominal tenderness in the left lower quadrant. There is no right CVA tenderness or left CVA tenderness.   Musculoskeletal:         General: No deformity. Normal range of motion.      Cervical back: Normal range of motion and neck supple.   Lymphadenopathy:      Cervical: No cervical adenopathy.   Skin:     General: Skin is warm and dry.      Findings: No rash.   Neurological:      Mental Status: She is alert and oriented to person, place, and time.      Gait: Gait normal.   Psychiatric:         Speech: Speech normal.         Behavior: Behavior normal.         Assessment:       No diagnosis found.     Plan:       There are no diagnoses linked to this encounter.  No follow-ups on file.        5/17/2023 Denisse Beltrán      "

## 2023-05-17 NOTE — FIRST PROVIDER EVALUATION
Medical screening examination initiated.  I have conducted a focused provider triage encounter, findings are as follows:    Brief history of present illness:  High blood pressure and headache today.  She took her nighttime med blood pressure to go down.  So she came to the emergency room    Vitals:    05/16/23 2221 05/16/23 2226 05/16/23 2230 05/16/23 2301   BP:   (!) 191/82 (!) 181/78   BP Location:       Patient Position:       Pulse:   71 71   Resp:  20 (!) 21 18   Temp:       TempSrc:       SpO2: 95%  96% 95%   Weight:       Height:           Pertinent physical exam:  Hypertension,    Brief workup plan:  Cardiac workup    Preliminary workup initiated; this workup will be continued and followed by the physician or advanced practice provider that is assigned to the patient when roomed.

## 2023-05-17 NOTE — TELEPHONE ENCOUNTER
----- Message from Kingston More sent at 5/17/2023 12:45 PM CDT -----  Type: Need Medical Advice   Who Called: Patient  Best callback number: [phone]  Additional Information:  Patient called to cancel tomorrows appointment she will call back to reschedule  Thanks

## 2023-05-21 ENCOUNTER — PATIENT MESSAGE (OUTPATIENT)
Dept: FAMILY MEDICINE | Facility: CLINIC | Age: 88
End: 2023-05-21

## 2023-05-22 ENCOUNTER — TELEPHONE (OUTPATIENT)
Dept: FAMILY MEDICINE | Facility: CLINIC | Age: 88
End: 2023-05-22

## 2023-05-22 NOTE — TELEPHONE ENCOUNTER
----- Message from Damian Nayak MA sent at 5/22/2023 11:27 AM CDT -----  Regarding: med clarification  Pt calling to find out how often can she use the hydralazine when her bp has spiked? [phone]

## 2023-05-22 NOTE — TELEPHONE ENCOUNTER
DEVON Salcedo. Spoke with patient and let her know. She had multiple questions about her blood pressure - it looks like the parameters were given by Dr. Chinchilla and say to take only if BP is greater than 180/100. She is not scheduled for an appointment with them for a f/u - she agrees to call them and get scheduled. States her bp this morning was only 150s.

## 2023-05-24 DIAGNOSIS — I10 ESSENTIAL (PRIMARY) HYPERTENSION: ICD-10-CM

## 2023-05-24 RX ORDER — AMLODIPINE BESYLATE 2.5 MG/1
2.5 TABLET ORAL 2 TIMES DAILY
Qty: 60 TABLET | Refills: 3
Start: 2023-05-24 | End: 2023-06-26 | Stop reason: SDUPTHER

## 2023-05-26 ENCOUNTER — PATIENT MESSAGE (OUTPATIENT)
Dept: FAMILY MEDICINE | Facility: CLINIC | Age: 88
End: 2023-05-26

## 2023-05-29 ENCOUNTER — OFFICE VISIT (OUTPATIENT)
Dept: CARDIOLOGY | Facility: CLINIC | Age: 88
End: 2023-05-29
Payer: MEDICARE

## 2023-05-29 VITALS
HEIGHT: 62 IN | DIASTOLIC BLOOD PRESSURE: 74 MMHG | SYSTOLIC BLOOD PRESSURE: 136 MMHG | HEART RATE: 89 BPM | OXYGEN SATURATION: 97 % | BODY MASS INDEX: 24.75 KG/M2 | WEIGHT: 134.5 LBS

## 2023-05-29 DIAGNOSIS — I10 ESSENTIAL HYPERTENSION: ICD-10-CM

## 2023-05-29 DIAGNOSIS — R60.9 FLUID RETENTION: ICD-10-CM

## 2023-05-29 DIAGNOSIS — Z95.0 PACEMAKER: ICD-10-CM

## 2023-05-29 DIAGNOSIS — I10 ESSENTIAL (PRIMARY) HYPERTENSION: Primary | ICD-10-CM

## 2023-05-29 DIAGNOSIS — Z86.79 HISTORY OF BUNDLE BRANCH BLOCK: ICD-10-CM

## 2023-05-29 DIAGNOSIS — R79.89 ELEVATED BRAIN NATRIURETIC PEPTIDE (BNP) LEVEL: ICD-10-CM

## 2023-05-29 DIAGNOSIS — I25.10 CORONARY ARTERY DISEASE INVOLVING NATIVE CORONARY ARTERY OF NATIVE HEART WITHOUT ANGINA PECTORIS: ICD-10-CM

## 2023-05-29 PROCEDURE — 99214 OFFICE O/P EST MOD 30 MIN: CPT | Mod: PBBFAC,PN | Performed by: NURSE PRACTITIONER

## 2023-05-29 PROCEDURE — 93010 ELECTROCARDIOGRAM REPORT: CPT | Mod: S$PBB,,, | Performed by: SPECIALIST

## 2023-05-29 PROCEDURE — 93010 EKG 12-LEAD: ICD-10-PCS | Mod: S$PBB,,, | Performed by: SPECIALIST

## 2023-05-29 PROCEDURE — 99213 PR OFFICE/OUTPT VISIT, EST, LEVL III, 20-29 MIN: ICD-10-PCS | Mod: S$PBB,,, | Performed by: NURSE PRACTITIONER

## 2023-05-29 PROCEDURE — 99213 OFFICE O/P EST LOW 20 MIN: CPT | Mod: S$PBB,,, | Performed by: NURSE PRACTITIONER

## 2023-05-29 PROCEDURE — 93005 ELECTROCARDIOGRAM TRACING: CPT | Mod: PBBFAC,PN | Performed by: SPECIALIST

## 2023-05-29 PROCEDURE — 99999 PR PBB SHADOW E&M-EST. PATIENT-LVL IV: CPT | Mod: PBBFAC,,, | Performed by: NURSE PRACTITIONER

## 2023-05-29 PROCEDURE — 99999 PR PBB SHADOW E&M-EST. PATIENT-LVL IV: ICD-10-PCS | Mod: PBBFAC,,, | Performed by: NURSE PRACTITIONER

## 2023-05-29 RX ORDER — POLYETHYLENE GLYCOL 3350 17 G/17G
17 POWDER, FOR SOLUTION ORAL DAILY
COMMUNITY

## 2023-05-29 NOTE — PROGRESS NOTES
Subjective:    Patient ID:  Vee Cadena is a 89 y.o. female patient here for evaluation Hypertension    History of Present Illness:     Patient is an 89-year-old female in clinic today with complaints of hypotension.  She reports concurrent frequent headaches when her blood pressure is high.  Patient has been taking her blood pressure at home.  She states her balance is off but she has had no falls.  She uses a cane for balance.  Patient does have sleep apnea but she states she would rather die than wear the CPAP machine.    Patient was on losartan 50 mg daily.  On 05/17 she increased the losartan to twice daily.  She has not been spacing it out appropriately however.  She was taking 50 mg of losartan in the morning and then 50 again in the early afternoon.  Since then her blood pressure has had trends in the 90s over 50s.  Today in the office her blood pressure is 136/74.          Most Recent Echocardiogram Results  Results for orders placed during the hospital encounter of 09/05/21    Echo Saline Bubble? No    Interpretation Summary  · The estimated PA systolic pressure is 46 mmHg.  · The left ventricle is normal in size with mild concentric hypertrophy and normal systolic function.  · The estimated ejection fraction is 65%.  · Normal left ventricular diastolic function.  · Normal right ventricular size with normal right ventricular systolic function.  · Mild left atrial enlargement.  · Mild tricuspid regurgitation.  · Mild-to-moderate mitral regurgitation.      Most Recent Nuclear Stress Test Results  No results found for this or any previous visit.      Most Recent Cardiac PET Stress Test Results  No results found for this or any previous visit.      Most Recent Cardiovascular Angiogram results  No results found for this or any previous visit.      Other Most Recent Cardiology Results  Results for orders placed during the hospital encounter of 04/04/23    Cardiac device check - In Clinic &  Hospital    Narrative  · 1. Normal device function.  · 2. One NSVT episode lasting one second. Six beats; 214 bpm.  · 3. Rate response turned ON.  · 4. RTC: One year.      Please see  for full report.      REVIEW OF SYSTEMS: As noted in HPI   CARDIOVASCULAR: No recent chest pain, palpitations, arm, neck, or jaw pain.  RESPIRATORY: No recent fever, cough chills, SOB.  : No blood in the urine  GI: No nausea, vomiting, or blood in stool.   MUSCULOSKELETAL: No myalgias or falls.   NEURO: No syncope, lightheadedness, or dizziness.  EYES: No sudden changes in vision.     Past Medical History:   Diagnosis Date    Back ache     Back pain     Carotid bruit     Cervical disc disease     Chest pain     Coronary artery disease     Diffuse cystic mastopathy     Elevated blood pressure     Encounter for blood transfusion     Foot pain, left     Generalized osteoarthrosis     Herpes zoster without complications     Hypertension     Neck pain     Neuropathy     Osteoporosis     Palpitations     PONV (postoperative nausea and vomiting)     TIA (transient ischemic attack)      Past Surgical History:   Procedure Laterality Date    APPENDECTOMY      bladder and colon lift and retached      CARDIAC CATHETERIZATION      CATARACT EXTRACTION  02/23/2009    CHOLECYSTECTOMY      COLONOSCOPY Left 9/6/2021    Procedure: COLONOSCOPY;  Surgeon: Erlin Lainez MD;  Location: [facility];  Service: Endoscopy;  Laterality: Left;    ESOPHAGOGASTRODUODENOSCOPY Left 9/6/2021    Procedure: EGD (ESOPHAGOGASTRODUODENOSCOPY);  Surgeon: Erlin Lainez MD;  Location: [facility];  Service: Endoscopy;  Laterality: Left;    INSERTION OF PACEMAKER  12/2019    pt reported    TOTAL ABDOMINAL HYSTERECTOMY      TUBAL LIGATION       Social History     Tobacco Use    Smoking status: Never    Smokeless tobacco: Never   Substance Use Topics    Alcohol use: Not Currently     Comment: socially if ever         Objective      Vitals:    05/29/23  1340   BP: 136/74   Pulse: 89       LAST EKG  Results for orders placed or performed in visit on 05/29/23   IN OFFICE EKG 12-LEAD (to [place])    Collection Time: 05/29/23  1:49 PM    Narrative    Test Reason : I10,    Vent. Rate : 072 BPM     Atrial Rate : 072 BPM     P-R Int : 216 ms          QRS Dur : 180 ms      QT Int : 458 ms       P-R-T Axes : 077 -88 079 degrees     QTc Int : 501 ms    Atrial-sensed ventricular-paced rhythm with prolonged AV conduction  Abnormal ECG  When compared with ECG of 16-MAY-2023 22:09,  Vent. rate has decreased BY   3 BPM    Referred By:             Confirmed By:      LIPIDS - LAST 2   Lab Results   Component Value Date    CHOL 116 02/14/2023    CHOL 137 07/07/2022    HDL 59 02/14/2023    HDL 68 07/07/2022    LDLCALC 44 02/14/2023    LDLCALC 62.8 (L) 07/07/2022    TRIG 54 02/14/2023    TRIG 31 07/07/2022    CHOLHDL 2.0 02/14/2023    CHOLHDL 49.6 07/07/2022     CARDIAC PROFILE - LAST 2  Lab Results   Component Value Date     (H) 05/16/2023     (H) 04/03/2022    CPK 48 09/06/2021    CPK 23 09/06/2021    CPKMB 1.7 09/06/2021    CPKMB 1.2 09/06/2021    TROPONINI <0.030 04/03/2022    TROPONINI <0.030 04/03/2022      CBC - LAST 2  Lab Results   Component Value Date    WBC 5.83 05/16/2023    WBC 7.2 02/14/2023    RBC 3.85 (L) 05/16/2023    RBC 4.29 02/14/2023    HGB 11.4 (L) 05/16/2023    HGB 12.6 02/14/2023    HCT 34.1 (L) 05/16/2023    HCT 38.4 02/14/2023     05/16/2023     02/14/2023     Lab Results   Component Value Date    LABPT 13.8 09/05/2021    INR 1.0 05/16/2023    INR 1.1 09/05/2021     CHEMISTRY - LAST 2  Lab Results   Component Value Date     05/26/2023     (L) 05/16/2023    K 5.1 05/26/2023    K 4.4 05/16/2023     05/26/2023    CL 97 05/16/2023    CO2 29 05/26/2023    CO2 23 05/16/2023    ANIONGAP 9 05/16/2023    ANIONGAP 7 (L) 07/07/2022    BUN 21 05/26/2023    BUN 24 (H) 05/16/2023    CREATININE 1.13 (H) 05/26/2023    CREATININE 1.1  05/16/2023    GLU 88 05/26/2023     05/16/2023    CALCIUM 8.8 05/26/2023    CALCIUM 8.4 (L) 05/16/2023    MG 1.8 09/07/2021    MG 1.9 09/06/2021    ALBUMIN 3.6 05/16/2023    ALBUMIN 4.0 02/14/2023    PROT 6.7 05/16/2023    PROT 6.3 02/14/2023    ALKPHOS 58 05/16/2023    ALKPHOS 57 07/07/2022    ALT 14 05/16/2023    ALT 30 (H) 02/14/2023    AST 18 05/16/2023    AST 32 02/14/2023    BILITOT 0.9 05/16/2023    BILITOT 0.6 02/14/2023      ENDOCRINE - LAST 2  Lab Results   Component Value Date    HGBA1C 5.6 07/01/2016    TSH 0.993 07/01/2016        PHYSICAL EXAM  CONSTITUTIONAL: Well built, well nourished in no apparent distress  NECK: no carotid bruit, no JVD  LUNGS: CTA  CHEST WALL: no tenderness  HEART: regular rate and rhythm, S1, S2 normal, no murmur, click, rub or gallop   ABDOMEN: soft, non-tender; bowel sounds normal; no masses,  no organomegaly  EXTREMITIES: Extremities normal, no edema, no calf tenderness noted  NEURO: AAO X 3    I HAVE REVIEWED :    The vital signs, most recent cardiac testing, and most recent pertinent non-cardiology provider notes.    Current Outpatient Medications   Medication Instructions    acetaminophen (TYLENOL) 500 mg, Oral, As needed (PRN), PRN    amLODIPine (NORVASC) 2.5 mg, Oral, 2 times daily    cholecalciferol (vitamin D3) (VITAMIN D3) 1,000 Units, Oral, Daily    cyanocobalamin 500 mcg, Oral, Daily    hydrALAZINE (APRESOLINE) 10 mg, Oral, Daily PRN    losartan (COZAAR) 50 mg, Oral, 2 times daily    polyethylene glycol (MIRALAX) 17 g, Oral, Daily    UNABLE TO FIND 3 Pump, Topical (Top), 5 times daily, Ketoprofen/Cyclobenzaprine/Lidocaine Hcl (lipomax) 10/2/5% <BR>APPLY UP TO 4.8 GRAMS (3 PUMPS) TO PAINFUL AREAS UP TO FIVE TIMES DAILY. RUB IN WELL    vitamin E 400 Units, Oral, Daily    zolpidem (AMBIEN) 5 MG Tab Take 1/2 tablet nightly prn      Assessment & Plan     Essential hypertension  BP is 136/74 mm in the office today.  Heart healthy low-sodium diet promoted  Patient  reports history of sleep apnea but is intolerant to CPAP machine    Patient recently increased her home losartan to twice daily however she has been taking a dose in the morning and then early afternoon.  I have discussed spacing in the losartan now at least 10-12 hours apart.  She voiced understanding.  She is going to continue digital medicine monitoring.  Patient  to continue amlodipine 2.5 mg p.o. b.i.d., losartan 50 mg p.o. b.i.d. and she has hydralazine 10 mg p.o. p.r.n. on hand with parameters to take 1 tablet for systolic blood pressure greater than 180 or diastolic blood pressure greater than 100.    Patient was to stop Lasix 20 mg daily  Continue to monitor her blood pressure at home.  EKG obtained in the office today.  Echocardiogram was ordered to evaluate LV and valvular function.    History of transient bundle branch block  EKG in the office today showed atrial sensed ventricular paced rhythm, prolonged AV conduction.    Pacemaker  Pacemaker in-situ, continue routine scheduled check as directed  Pacemaker capture noted on EKG    Coronary artery disease  Continue heart healthy diet  Cholesterol is well controlled on no cholesterol therapies   Latest Reference Range & Units Most Recent   LDL Cholesterol External mg/dL (calc) 44  2/14/23 11:24       BMI 24.0-24.9, adult  Healthy stable BMI, continue heart healthy diet                   MERE Mckinney

## 2023-06-02 ENCOUNTER — PATIENT MESSAGE (OUTPATIENT)
Dept: FAMILY MEDICINE | Facility: CLINIC | Age: 88
End: 2023-06-02

## 2023-06-02 ENCOUNTER — CLINICAL SUPPORT (OUTPATIENT)
Dept: CARDIOLOGY | Facility: HOSPITAL | Age: 88
End: 2023-06-02
Attending: NURSE PRACTITIONER
Payer: MEDICARE

## 2023-06-02 VITALS — WEIGHT: 134 LBS | BODY MASS INDEX: 24.66 KG/M2 | HEIGHT: 62 IN

## 2023-06-02 DIAGNOSIS — R60.9 FLUID RETENTION: ICD-10-CM

## 2023-06-02 DIAGNOSIS — R79.89 ELEVATED BRAIN NATRIURETIC PEPTIDE (BNP) LEVEL: ICD-10-CM

## 2023-06-02 LAB
AORTIC ROOT ANNULUS: 3.1 CM
AORTIC VALVE CUSP SEPERATION: 2.1 CM
ASCENDING AORTA: 3.2 CM
AV INDEX (PROSTH): 0.95
AV MEAN GRADIENT: 3 MMHG
AV PEAK GRADIENT: 6 MMHG
AV REGURGITATION PRESSURE HALF TIME: 399 MS
AV VALVE AREA: 2.99 CM2
AV VELOCITY RATIO: 0.97
BSA FOR ECHO PROCEDURE: 1.63 M2
CV ECHO LV RWT: 0.47 CM
DOP CALC AO PEAK VEL: 1.19 M/S
DOP CALC AO VTI: 25.1 CM
DOP CALC LVOT AREA: 3.1 CM2
DOP CALC LVOT DIAMETER: 2 CM
DOP CALC LVOT PEAK VEL: 1.16 M/S
DOP CALC LVOT STROKE VOLUME: 75.05 CM3
DOP CALC MV VTI: 19.5 CM
DOP CALCLVOT PEAK VEL VTI: 23.9 CM
E WAVE DECELERATION TIME: 225 MSEC
E/A RATIO: 0.74
E/E' RATIO: 8.13 M/S
ECHO LV POSTERIOR WALL: 1.11 CM (ref 0.6–1.1)
EJECTION FRACTION: 65 %
FRACTIONAL SHORTENING: 37 % (ref 28–44)
INTERVENTRICULAR SEPTUM: 1.12 CM (ref 0.6–1.1)
IVC DIAMETER: 1.36 CM
LEFT ATRIUM VOLUME INDEX MOD: 25 ML/M2
LEFT ATRIUM VOLUME MOD: 40.2 CM3
LEFT INTERNAL DIMENSION IN SYSTOLE: 2.98 CM (ref 2.1–4)
LEFT VENTRICLE DIASTOLIC VOLUME INDEX: 65.22 ML/M2
LEFT VENTRICLE DIASTOLIC VOLUME: 105 ML
LEFT VENTRICLE MASS INDEX: 121 G/M2
LEFT VENTRICLE SYSTOLIC VOLUME INDEX: 21.4 ML/M2
LEFT VENTRICLE SYSTOLIC VOLUME: 34.4 ML
LEFT VENTRICULAR INTERNAL DIMENSION IN DIASTOLE: 4.75 CM (ref 3.5–6)
LEFT VENTRICULAR MASS: 194.37 G
LV LATERAL E/E' RATIO: 7.22 M/S
LV SEPTAL E/E' RATIO: 9.29 M/S
LVOT MG: 3 MMHG
LVOT MV: 0.81 CM/S
MV MEAN GRADIENT: 2 MMHG
MV PEAK A VEL: 0.88 M/S
MV PEAK E VEL: 0.65 M/S
MV PEAK GRADIENT: 3 MMHG
MV STENOSIS PRESSURE HALF TIME: 71 MS
MV VALVE AREA BY CONTINUITY EQUATION: 3.85 CM2
MV VALVE AREA P 1/2 METHOD: 3.1 CM2
OHS LV EJECTION FRACTION SIMPSONS BIPLANE MOD: 6 %
PISA AR MAX VEL: 3.67 M/S
PISA TR MAX VEL: 2.81 M/S
PV MV: 0.75 M/S
PV PEAK VELOCITY: 1.12 CM/S
RV TISSUE DOPPLER FREE WALL SYSTOLIC VELOCITY 1 (APICAL 4 CHAMBER VIEW): 0.01 CM/S
SINUS: 2.96 CM
STJ: 2.49 CM
TDI LATERAL: 0.09 M/S
TDI SEPTAL: 0.07 M/S
TDI: 0.08 M/S
TR MAX PG: 32 MMHG
TRICUSPID ANNULAR PLANE SYSTOLIC EXCURSION: 2.02 CM

## 2023-06-02 PROCEDURE — 93306 TTE W/DOPPLER COMPLETE: CPT

## 2023-06-02 PROCEDURE — 93306 ECHO (CUPID ONLY): ICD-10-PCS | Mod: 26,,, | Performed by: INTERNAL MEDICINE

## 2023-06-02 PROCEDURE — 93306 TTE W/DOPPLER COMPLETE: CPT | Mod: 26,,, | Performed by: INTERNAL MEDICINE

## 2023-06-03 NOTE — ASSESSMENT & PLAN NOTE
BP is 136/74 mm in the office today.  Heart healthy low-sodium diet promoted  Patient reports history of sleep apnea but is intolerant to CPAP machine    Patient recently increased her home losartan to twice daily however she has been taking a dose in the morning and then early afternoon.  I have discussed spacing in the losartan now at least 10-12 hours apart.  She voiced understanding.  She is going to continue digital medicine monitoring.  Patient  to continue amlodipine 2.5 mg p.o. b.i.d., losartan 50 mg p.o. b.i.d. and she has hydralazine 10 mg p.o. p.r.n. on hand with parameters to take 1 tablet for systolic blood pressure greater than 180 or diastolic blood pressure greater than 100.    Patient was to stop Lasix 20 mg daily  Continue to monitor her blood pressure at home.  EKG obtained in the office today.  Echocardiogram was ordered to evaluate LV and valvular function.

## 2023-06-03 NOTE — ASSESSMENT & PLAN NOTE
Continue heart healthy diet  Cholesterol is well controlled on no cholesterol therapies   Latest Reference Range & Units Most Recent   LDL Cholesterol External mg/dL (calc) 44  2/14/23 11:24

## 2023-06-05 RX ORDER — METRONIDAZOLE 500 MG/1
500 TABLET ORAL 3 TIMES DAILY
Qty: 21 TABLET | Refills: 0 | Status: SHIPPED | OUTPATIENT
Start: 2023-06-05 | End: 2023-07-13

## 2023-06-05 RX ORDER — CIPROFLOXACIN 500 MG/1
500 TABLET ORAL 2 TIMES DAILY
Qty: 14 TABLET | Refills: 0 | Status: SHIPPED | OUTPATIENT
Start: 2023-06-05 | End: 2023-07-13

## 2023-06-10 NOTE — PROGRESS NOTES
Subjective:    Patient ID:  Vee Cadena is a 89 y.o. female patient here for evaluation Results (echo)    History of Present Illness:  F/up BP   Dealing w/ diverticulitis flare; completing cipro- has made her sick  BP better at home now since spacing out losartan 12 hours apart  Got some zipper compression hose for fluid retention in BLEs  No h/a, dizziness or syncope  Balance issues, using cane          Most Recent Echocardiogram Results  Results for orders placed in visit on 06/02/23    Echo    Interpretation Summary  · The left ventricle is normal in size with mild concentric hypertrophy and normal systolic function.  · The estimated ejection fraction is 65%.  · Indeterminate left ventricular diastolic function.  · Normal right ventricular size with normal right ventricular systolic function.  · Moderate left atrial enlargement.  · Mild right atrial enlargement.  · Mild tricuspid regurgitation.  · Mild mitral regurgitation.  · Mild pulmonic regurgitation.  · Mild aortic regurgitation.      Most Recent Nuclear Stress Test Results  No results found for this or any previous visit.      Most Recent Cardiac PET Stress Test Results  No results found for this or any previous visit.      Most Recent Cardiovascular Angiogram results  No results found for this or any previous visit.      Other Most Recent Cardiology Results  Results for orders placed during the hospital encounter of 04/04/23    Cardiac device check - In Clinic & Hospital    Narrative  · 1. Normal device function.  · 2. One NSVT episode lasting one second. Six beats; 214 bpm.  · 3. Rate response turned ON.  · 4. RTC: One year.      Please see  for full report.      REVIEW OF SYSTEMS: As noted in HPI   CARDIOVASCULAR: No recent chest pain, palpitations, arm, neck, or jaw pain.  RESPIRATORY: No recent fever, cough chills, SOB.  : No blood in the urine  GI: + nausea r/t medication, diverticulitis flare   MUSCULOSKELETAL: generalized  weakness, use of cane No  falls.   NEURO: No syncope, lightheadedness, or dizziness.  EYES: No sudden changes in vision.     Past Medical History:   Diagnosis Date    Back ache     Back pain     Carotid bruit     Cervical disc disease     Chest pain     Coronary artery disease     Diffuse cystic mastopathy     Elevated blood pressure     Encounter for blood transfusion     Foot pain, left     Generalized osteoarthrosis     Herpes zoster without complications     Hypertension     Neck pain     Neuropathy     Osteoporosis     Palpitations     PONV (postoperative nausea and vomiting)     TIA (transient ischemic attack)      Past Surgical History:   Procedure Laterality Date    APPENDECTOMY      bladder and colon lift and retached      CARDIAC CATHETERIZATION      CATARACT EXTRACTION  02/23/2009    CHOLECYSTECTOMY      COLONOSCOPY Left 9/6/2021    Procedure: COLONOSCOPY;  Surgeon: Erlin Lainez MD;  Location: [facility];  Service: Endoscopy;  Laterality: Left;    ESOPHAGOGASTRODUODENOSCOPY Left 9/6/2021    Procedure: EGD (ESOPHAGOGASTRODUODENOSCOPY);  Surgeon: Erlin Lainez MD;  Location: [facility];  Service: Endoscopy;  Laterality: Left;    INSERTION OF PACEMAKER  12/2019    pt reported    TOTAL ABDOMINAL HYSTERECTOMY      TUBAL LIGATION       Social History     Tobacco Use    Smoking status: Never    Smokeless tobacco: Never   Substance Use Topics    Alcohol use: Not Currently     Comment: socially if ever         Objective      Vitals:    06/13/23 0925   BP: 120/69   Pulse: 80       LAST EKG  Results for orders placed or performed in visit on 05/29/23   IN OFFICE EKG 12-LEAD (to [place])    Collection Time: 05/29/23  1:49 PM    Narrative    Test Reason : I10,    Vent. Rate : 072 BPM     Atrial Rate : 072 BPM     P-R Int : 216 ms          QRS Dur : 180 ms      QT Int : 458 ms       P-R-T Axes : 077 -88 079 degrees     QTc Int : 501 ms    Atrial-sensed ventricular-paced rhythm with prolonged AV  conduction  Abnormal ECG  When compared with ECG of 16-MAY-2023 22:09,  Vent. rate has decreased BY   3 BPM    Referred By:             Confirmed By:      LIPIDS - LAST 2   Lab Results   Component Value Date    CHOL 116 02/14/2023    CHOL 137 07/07/2022    HDL 59 02/14/2023    HDL 68 07/07/2022    LDLCALC 44 02/14/2023    LDLCALC 62.8 (L) 07/07/2022    TRIG 54 02/14/2023    TRIG 31 07/07/2022    CHOLHDL 2.0 02/14/2023    CHOLHDL 49.6 07/07/2022     CARDIAC PROFILE - LAST 2  Lab Results   Component Value Date     (H) 05/16/2023     (H) 04/03/2022    CPK 48 09/06/2021    CPK 23 09/06/2021    CPKMB 1.7 09/06/2021    CPKMB 1.2 09/06/2021    TROPONINI <0.030 04/03/2022    TROPONINI <0.030 04/03/2022      CBC - LAST 2  Lab Results   Component Value Date    WBC 5.83 05/16/2023    WBC 7.2 02/14/2023    RBC 3.85 (L) 05/16/2023    RBC 4.29 02/14/2023    HGB 11.4 (L) 05/16/2023    HGB 12.6 02/14/2023    HCT 34.1 (L) 05/16/2023    HCT 38.4 02/14/2023     05/16/2023     02/14/2023     Lab Results   Component Value Date    LABPT 13.8 09/05/2021    INR 1.0 05/16/2023    INR 1.1 09/05/2021     CHEMISTRY - LAST 2  Lab Results   Component Value Date     05/26/2023     (L) 05/16/2023    K 5.1 05/26/2023    K 4.4 05/16/2023     05/26/2023    CL 97 05/16/2023    CO2 29 05/26/2023    CO2 23 05/16/2023    ANIONGAP 9 05/16/2023    ANIONGAP 7 (L) 07/07/2022    BUN 21 05/26/2023    BUN 24 (H) 05/16/2023    CREATININE 1.13 (H) 05/26/2023    CREATININE 1.1 05/16/2023    GLU 88 05/26/2023     05/16/2023    CALCIUM 8.8 05/26/2023    CALCIUM 8.4 (L) 05/16/2023    MG 1.8 09/07/2021    MG 1.9 09/06/2021    ALBUMIN 3.6 05/16/2023    ALBUMIN 4.0 02/14/2023    PROT 6.7 05/16/2023    PROT 6.3 02/14/2023    ALKPHOS 58 05/16/2023    ALKPHOS 57 07/07/2022    ALT 14 05/16/2023    ALT 30 (H) 02/14/2023    AST 18 05/16/2023    AST 32 02/14/2023    BILITOT 0.9 05/16/2023    BILITOT 0.6 02/14/2023       ENDOCRINE - LAST 2  Lab Results   Component Value Date    HGBA1C 5.6 07/01/2016    TSH 0.993 07/01/2016        PHYSICAL EXAM  CONSTITUTIONAL: small framed elderly female breathing comfortably in no apparent distress  NECK: no carotid bruit, no JVD  LUNGS: CTA  CHEST WALL: no tenderness  HEART: regular rate and rhythm, S1, S2 normal, no murmur, click, rub or gallop   ABDOMEN: soft, non-tender; bowel sounds normal; no masses,  no organomegaly  EXTREMITIES: Extremities normal, no edema, no calf tenderness noted  NEURO: AAO X 3    I HAVE REVIEWED :    The vital signs, most recent cardiac testing, and most recent pertinent non-cardiology provider notes.    Current Outpatient Medications   Medication Instructions    acetaminophen (TYLENOL) 500 mg, Oral, As needed (PRN), PRN    amLODIPine (NORVASC) 2.5 mg, Oral, 2 times daily    cholecalciferol (vitamin D3) (VITAMIN D3) 1,000 Units, Oral, Daily    ciprofloxacin HCl (CIPRO) 500 mg, Oral, 2 times daily    cyanocobalamin 500 mcg, Oral, Daily    hydrALAZINE (APRESOLINE) 10 mg, Oral, Daily PRN    hydrocortisone 2.5 % cream Topical (Top), 2 times daily    losartan (COZAAR) 50 mg, Oral, 2 times daily    metroNIDAZOLE (FLAGYL) 500 mg, Oral, 3 times daily    polyethylene glycol (GLYCOLAX) 17 g, Oral, Daily    UNABLE TO FIND 3 Pump, Topical (Top), 5 times daily, Ketoprofen/Cyclobenzaprine/Lidocaine Hcl (lipomax) 10/2/5% <BR>APPLY UP TO 4.8 GRAMS (3 PUMPS) TO PAINFUL AREAS UP TO FIVE TIMES DAILY. RUB IN WELL    vitamin E 400 Units, Oral, Daily    zolpidem (AMBIEN) 5 MG Tab Take 1/2 tablet nightly prn      Assessment & Plan     Chronic kidney disease   Latest Reference Range & Units Most Recent   eGFR > OR = 60 mL/min/1.73m2 47 (L)  5/26/23 11:05   (L): Data is abnormally low  Avoid NSAIDS  Avoid dehydration  Dr. Isaac referral    Essential hypertension  120/69 mmHg in office today  BP much better since taking BP meds 12 hrs apart  Amlodipine 2.5 mg BID and Losartan 50 mg BID  Low  sodium diet  Sleep apnea- untreated    Pacemaker  Continue routine pacemaker checks  Last device check in April 2023    Stage 3b chronic kidney disease  Referral to DR. Isaac for CKD   Latest Reference Range & Units Most Recent   Creatinine 0.60 - 0.95 mg/dL 1.13 (H)  5/26/23 11:05   BUN/CREAT RATIO 6 - 22 (calc) 19  5/26/23 11:05   eGFR >60 mL/min/1.73 m^2 48.0 !  5/16/23 22:25   eGFR > OR = 60 mL/min/1.73m2 47 (L)  5/26/23 11:05   eGFR if non African American >60 mL/min/1.73 m^2 40.4 !  7/7/22 09:16   eGFR if African American >60 mL/min/1.73 m^2 46.6 !  7/7/22 09:16   (H): Data is abnormally high  !: Data is abnormal  (L): Data is abnormally low    BMI 25.0-25.9,adult  stable    Gait abnormality  Fall precautions

## 2023-06-13 ENCOUNTER — OFFICE VISIT (OUTPATIENT)
Dept: CARDIOLOGY | Facility: CLINIC | Age: 88
End: 2023-06-13
Payer: MEDICARE

## 2023-06-13 VITALS
SYSTOLIC BLOOD PRESSURE: 120 MMHG | OXYGEN SATURATION: 96 % | BODY MASS INDEX: 25.48 KG/M2 | HEART RATE: 80 BPM | DIASTOLIC BLOOD PRESSURE: 69 MMHG | WEIGHT: 139.31 LBS

## 2023-06-13 DIAGNOSIS — Z95.0 PACEMAKER: ICD-10-CM

## 2023-06-13 DIAGNOSIS — N18.31 STAGE 3A CHRONIC KIDNEY DISEASE: ICD-10-CM

## 2023-06-13 DIAGNOSIS — I10 ESSENTIAL HYPERTENSION: ICD-10-CM

## 2023-06-13 DIAGNOSIS — R26.9 GAIT ABNORMALITY: ICD-10-CM

## 2023-06-13 DIAGNOSIS — N18.32 STAGE 3B CHRONIC KIDNEY DISEASE: ICD-10-CM

## 2023-06-13 PROBLEM — N18.9 CHRONIC KIDNEY DISEASE: Status: ACTIVE | Noted: 2023-06-13

## 2023-06-13 PROCEDURE — 99214 OFFICE O/P EST MOD 30 MIN: CPT | Mod: PBBFAC,PN | Performed by: NURSE PRACTITIONER

## 2023-06-13 PROCEDURE — 99214 OFFICE O/P EST MOD 30 MIN: CPT | Mod: S$PBB,,, | Performed by: NURSE PRACTITIONER

## 2023-06-13 PROCEDURE — 99999 PR PBB SHADOW E&M-EST. PATIENT-LVL IV: ICD-10-PCS | Mod: PBBFAC,,, | Performed by: NURSE PRACTITIONER

## 2023-06-13 PROCEDURE — 99999 PR PBB SHADOW E&M-EST. PATIENT-LVL IV: CPT | Mod: PBBFAC,,, | Performed by: NURSE PRACTITIONER

## 2023-06-13 PROCEDURE — 99214 PR OFFICE/OUTPT VISIT, EST, LEVL IV, 30-39 MIN: ICD-10-PCS | Mod: S$PBB,,, | Performed by: NURSE PRACTITIONER

## 2023-06-13 RX ORDER — HYDROCORTISONE 25 MG/G
CREAM TOPICAL 2 TIMES DAILY
Qty: 3.5 G | Refills: 1 | Status: SHIPPED | OUTPATIENT
Start: 2023-06-13

## 2023-06-13 NOTE — ASSESSMENT & PLAN NOTE
Latest Reference Range & Units Most Recent   eGFR > OR = 60 mL/min/1.73m2 47 (L)  5/26/23 11:05   (L): Data is abnormally low  Avoid NSAIDS  Avoid dehydration  Dr. Marlin nunez

## 2023-06-13 NOTE — ASSESSMENT & PLAN NOTE
120/69 mmHg in office today  BP much better since taking BP meds 12 hrs apart  Amlodipine 2.5 mg BID and Losartan 50 mg BID  Low sodium diet  Sleep apnea- untreated

## 2023-06-18 ENCOUNTER — PATIENT MESSAGE (OUTPATIENT)
Dept: FAMILY MEDICINE | Facility: CLINIC | Age: 88
End: 2023-06-18

## 2023-06-19 ENCOUNTER — PATIENT MESSAGE (OUTPATIENT)
Dept: FAMILY MEDICINE | Facility: CLINIC | Age: 88
End: 2023-06-19

## 2023-06-19 DIAGNOSIS — H93.8X9 SENSATION OF FULLNESS IN EAR, UNSPECIFIED LATERALITY: Primary | ICD-10-CM

## 2023-06-20 ENCOUNTER — TELEPHONE (OUTPATIENT)
Dept: FAMILY MEDICINE | Facility: CLINIC | Age: 88
End: 2023-06-20
Payer: MEDICARE

## 2023-06-22 NOTE — ASSESSMENT & PLAN NOTE
Referral to DR. Isaac for CKD   Latest Reference Range & Units Most Recent   Creatinine 0.60 - 0.95 mg/dL 1.13 (H)  5/26/23 11:05   BUN/CREAT RATIO 6 - 22 (calc) 19  5/26/23 11:05   eGFR >60 mL/min/1.73 m^2 48.0 !  5/16/23 22:25   eGFR > OR = 60 mL/min/1.73m2 47 (L)  5/26/23 11:05   eGFR if non African American >60 mL/min/1.73 m^2 40.4 !  7/7/22 09:16   eGFR if African American >60 mL/min/1.73 m^2 46.6 !  7/7/22 09:16   (H): Data is abnormally high  !: Data is abnormal  (L): Data is abnormally low

## 2023-06-26 ENCOUNTER — TELEPHONE (OUTPATIENT)
Dept: CARDIOLOGY | Facility: CLINIC | Age: 88
End: 2023-06-26
Payer: MEDICARE

## 2023-06-26 DIAGNOSIS — Z95.0 PACEMAKER: Primary | ICD-10-CM

## 2023-06-26 DIAGNOSIS — I10 ESSENTIAL (PRIMARY) HYPERTENSION: ICD-10-CM

## 2023-06-26 RX ORDER — AMLODIPINE BESYLATE 2.5 MG/1
2.5 TABLET ORAL 2 TIMES DAILY
Qty: 60 TABLET | Refills: 3
Start: 2023-06-26 | End: 2023-06-30 | Stop reason: SDUPTHER

## 2023-06-26 NOTE — TELEPHONE ENCOUNTER
Last ov: 5/17/23  Next ov: 11/20/23  Last dispense:  3/2/23 by another provider.  Last written per chart: 5/24/23 set for no print  
See MDM - Zhane Pena MD

## 2023-06-26 NOTE — TELEPHONE ENCOUNTER
----- Message from Kingston More sent at 6/26/2023 12:09 PM CDT -----  Type: Need Medical Advice   Who Called: patient  Best callback number: [phone]  Additional Information: Patient called to schedule her pacemaker check  Please call to further assist, Thanks

## 2023-06-27 DIAGNOSIS — G47.00 INSOMNIA, UNSPECIFIED TYPE: ICD-10-CM

## 2023-06-27 RX ORDER — ZOLPIDEM TARTRATE 5 MG/1
TABLET ORAL
Qty: 45 TABLET | Refills: 1 | Status: SHIPPED | OUTPATIENT
Start: 2023-06-27 | End: 2024-02-20

## 2023-06-30 ENCOUNTER — TELEPHONE (OUTPATIENT)
Dept: CARDIOLOGY | Facility: CLINIC | Age: 88
End: 2023-06-30
Payer: MEDICARE

## 2023-06-30 DIAGNOSIS — I10 ESSENTIAL (PRIMARY) HYPERTENSION: ICD-10-CM

## 2023-06-30 RX ORDER — AMLODIPINE BESYLATE 2.5 MG/1
2.5 TABLET ORAL 2 TIMES DAILY
Qty: 60 TABLET | Refills: 3
Start: 2023-06-30 | End: 2023-07-03 | Stop reason: SDUPTHER

## 2023-06-30 NOTE — TELEPHONE ENCOUNTER
----- Message from Brenda Qian sent at 6/30/2023  9:16 AM CDT -----  Contact: Self  Type:  RX Refill Request    Who Called:  Patient  Refill or New Rx:  New rx  RX Name and Strength:  amLODIPine (NORVASC) 2.5 MG tablet  How is the patient currently taking it? (ex. 1XDay):  As Directed  Is this a 30 day or 90 day RX:  90  Preferred Pharmacy with phone number:    [id number] - [place], [place] - [address] [address]  Phone: [phone] Fax: [phone]  Local or Mail Order:  Local  Ordering Provider:  Mary Rockwell NP  Best Call Back Number:  [phone]  Additional Information:  Pt stated she only has todays medication and will be out by tomorrow, can we please get this refilled today. Thank You

## 2023-07-03 DIAGNOSIS — I10 ESSENTIAL (PRIMARY) HYPERTENSION: ICD-10-CM

## 2023-07-03 RX ORDER — AMLODIPINE BESYLATE 2.5 MG/1
2.5 TABLET ORAL 2 TIMES DAILY
Qty: 60 TABLET | Refills: 3 | Status: CANCELLED
Start: 2023-07-03 | End: 2023-10-31

## 2023-07-03 NOTE — TELEPHONE ENCOUNTER
----- Message from Esequiel Christensen sent at 7/3/2023 10:25 AM CDT -----  Regarding: Refill  Contact: patient  Type:  RX Refill Request    Who Called: patient  Refill or New Rx:refill  RX Name and Strength:amLODIPine (NORVASC) 2.5 MG tablet  How is the patient currently taking it? (ex. 1XDay):  Is this a 30 day or 90 day RX:  Preferred Pharmacy with phone number:  [id number] - [place], [place] - [address] [address]  Phone: [phone] Fax: [phone]  Local or Mail Order:local  Ordering Provider:Mary Rockwell  Would the patient rather a call back or a response via [facility]? call  Best Call Back Number:[phone]  Additional Information: patient called regarding a refill.

## 2023-07-13 ENCOUNTER — OFFICE VISIT (OUTPATIENT)
Dept: OTOLARYNGOLOGY | Facility: CLINIC | Age: 88
End: 2023-07-13
Payer: MEDICARE

## 2023-07-13 ENCOUNTER — CLINICAL SUPPORT (OUTPATIENT)
Dept: AUDIOLOGY | Facility: CLINIC | Age: 88
End: 2023-07-13
Payer: MEDICARE

## 2023-07-13 VITALS
HEART RATE: 77 BPM | HEIGHT: 62 IN | SYSTOLIC BLOOD PRESSURE: 110 MMHG | DIASTOLIC BLOOD PRESSURE: 62 MMHG | BODY MASS INDEX: 24.91 KG/M2 | WEIGHT: 135.38 LBS

## 2023-07-13 DIAGNOSIS — H90.3 SENSORINEURAL HEARING LOSS, BILATERAL: Primary | ICD-10-CM

## 2023-07-13 DIAGNOSIS — R42 DYSEQUILIBRIUM: Primary | ICD-10-CM

## 2023-07-13 DIAGNOSIS — R26.89 PERIPHERAL NEUROPATHIC GAIT: ICD-10-CM

## 2023-07-13 DIAGNOSIS — R42 DYSEQUILIBRIUM: ICD-10-CM

## 2023-07-13 DIAGNOSIS — H93.8X9 SENSATION OF FULLNESS IN EAR, UNSPECIFIED LATERALITY: ICD-10-CM

## 2023-07-13 DIAGNOSIS — L29.9 ITCHING OF EAR: ICD-10-CM

## 2023-07-13 PROCEDURE — 99204 PR OFFICE/OUTPT VISIT, NEW, LEVL IV, 45-59 MIN: ICD-10-PCS | Mod: S$PBB,,, | Performed by: OTOLARYNGOLOGY

## 2023-07-13 PROCEDURE — 99999 PR PBB SHADOW E&M-EST. PATIENT-LVL V: ICD-10-PCS | Mod: PBBFAC,,, | Performed by: OTOLARYNGOLOGY

## 2023-07-13 PROCEDURE — 99999 PR PBB SHADOW E&M-EST. PATIENT-LVL V: CPT | Mod: PBBFAC,,, | Performed by: OTOLARYNGOLOGY

## 2023-07-13 PROCEDURE — 92567 TYMPANOMETRY: CPT | Mod: PBBFAC,PO | Performed by: AUDIOLOGIST

## 2023-07-13 PROCEDURE — 99215 OFFICE O/P EST HI 40 MIN: CPT | Mod: PBBFAC,PO | Performed by: OTOLARYNGOLOGY

## 2023-07-13 PROCEDURE — 99204 OFFICE O/P NEW MOD 45 MIN: CPT | Mod: S$PBB,,, | Performed by: OTOLARYNGOLOGY

## 2023-07-13 PROCEDURE — 92557 COMPREHENSIVE HEARING TEST: CPT | Mod: PBBFAC,PO | Performed by: AUDIOLOGIST

## 2023-07-13 RX ORDER — FLUOCINOLONE ACETONIDE 0.11 MG/ML
OIL AURICULAR (OTIC)
Qty: 20 ML | Refills: 0 | Status: SHIPPED | OUTPATIENT
Start: 2023-07-13

## 2023-07-13 NOTE — PATIENT INSTRUCTIONS
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  Some degree of progressive disequilibrium enough aging as discussed which include some generalized worsening of inner ear function is favored but the more acute and balance/risk of fall issues seem to be related to numbness of the feet and slowing of nerve conduction as discussed.      B12 and folate levels ordered and follow-up with neurology recommended for further evaluation and treatment of neuropathy.  For core strengthening, gait improvement and fall prevention also recommended and ordered.      Routine ear care as outlined with derm otic oil as outlined if needed for more significant itching.  Routine audiologic testing for ear fullness is further recommended.      Return with any worsening of symptoms, failure to improve, or any other concerns for further evaluation and treatment.        Voice recognition software was used in the creation of this note/communication and any sound-alike errors which may have occurred from its use should be taken in context when interpreting.  If such errors prevent a clear understanding of the note/communication, please contact the office for clarification.          DERMOTIC/FLUOCINOLONE OIL    Use prescribed fluocinolone/derm otic oil to both ears smearing around the outer ear scaling areas as well as down in the ear canal twice daily for a week no more than 2. At this point follow a routine ear care as outlined.  If not improved with this dose of steroid a higher concentration steroid may prove necessary.      ROUTINE EAR CARE    Keep the ears dry in general.  Water and soap dry the ears increases scaling by stripping away the natural oils of the ears.    A twisted single ply of facial tissue can be used to wick out moisture on the rare occasion when water gets stuck in the ear; or the water can be displaced with concentrated alcohol like OTC SwimEar or a few drops of  72-95% isopropyl alcohol to fill the ear canal.  Regular use will cause excess drying of the ears.    The ear should be kept moist in general with mineral oil. Three to four drops into the ear canal 2 to 3 times a week or even every night.    If the ears need to be irrigated use either a 50 50 mixture of white vinegar and distilled water or 50 50 mixture of alcohol and white vinegar.    Painful draining ears that do not resolve with conservative care could represent infection and may need microscopic office debridement/clearing of the wax, and topical antibiotic drops with or without steroids may need to be prescribed.

## 2023-07-13 NOTE — PROGRESS NOTES
Vee Cadena was seen 07/13/2023 for an audiological evaluation. Pt was alone during today's visit. Pertinent complaints today include dizziness. Pt denies history of loud noise exposure and denies early onset of genetic family history of hearing loss. Otoscopy revealed no cerumen in both ears. The tympanic membrane was visualized AU prior to proceeding with the hearing testing.     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    Speech Reception Thresholds were  35 dBHL for the right ear and 30 dBHL for the left ear.    Word recognition scores were good for the right ear and good for the left ear.   Tympanograms were Type A for the right ear and Type A for the left ear.    Recommendations: 1) Follow up with ENT     2) Annual hearing evaluation     3) Hearing aid consult when ready    Audiogram results were reviewed in detail with patient and all questions were answered. Results will be reviewed by the referring provider at the completion of this note. All complaints were addressed during this visit to the patient's satisfaction.

## 2023-07-13 NOTE — PROGRESS NOTES
" [facility]    Subjective:      Patient: Vee Cadena Patient PCP: Denisse Beltrán NP         :  10/9/1933     Sex:  female      MRN:  [id number]          Date of Visit: 2023      Chief Complaint: Ear Fullness (States right side is worse than left. States ears itch and feel like have fluid in them. States hearing is muffled at times. Has been going on for "a while". States that she thought it was due to BP medication, but has changed meds and still has the symptoms. No audiogram scheduled today. )      Patient ID: Vee Cadena is a 89 y.o. female lifelong NON-smoker with a past medical history of HTN, CKD 3, CAD, pacemaker but no chronic ear disease.  No known diagnoses of eczema or psoriasis.  referred to me by Eric Coates in consultation for fullness and fluid sensation with some itching right-greater-than-left years.  Feels muffled this has been going on for months.  No audiogram.  No actual pain or drainage.    Review of Systems     Past Medical History  She has a past medical history of Back ache, Back pain, Carotid bruit, Cervical disc disease, Chest pain, Coronary artery disease, Diffuse cystic mastopathy, Elevated blood pressure, Encounter for blood transfusion, Foot pain, left, Generalized osteoarthrosis, Herpes zoster without complications, Hypertension, Neck pain, Neuropathy, Osteoporosis, Palpitations, PONV (postoperative nausea and vomiting), and TIA (transient ischemic attack).    Family / Surgical / Social History  Her family history includes COPD in her daughter; Cancer in her brother; Dementia in her mother; Diabetes in her brother, father, and sister; Down syndrome in her daughter; Heart disease in her brother, father, and sister; Hyperlipidemia in her brother and sister; Hypertension in her brother and sister; Stroke in her father; Thyroid disease in her father.    Past Surgical History:   Procedure Laterality Date    APPENDECTOMY      bladder and colon lift and retached      " CARDIAC CATHETERIZATION      CATARACT EXTRACTION  02/23/2009    CHOLECYSTECTOMY      COLONOSCOPY Left 9/6/2021    Procedure: COLONOSCOPY;  Surgeon: Erlin Lainez MD;  Location: [facility];  Service: Endoscopy;  Laterality: Left;    ESOPHAGOGASTRODUODENOSCOPY Left 9/6/2021    Procedure: EGD (ESOPHAGOGASTRODUODENOSCOPY);  Surgeon: Erlin Lainez MD;  Location: [facility] ENDO;  Service: Endoscopy;  Laterality: Left;    INSERTION OF PACEMAKER  12/2019    pt reported    TOTAL ABDOMINAL HYSTERECTOMY      TUBAL LIGATION         Social History     Tobacco Use    Smoking status: Never    Smokeless tobacco: Never   Substance and Sexual Activity    Alcohol use: Not Currently     Comment: socially if ever    Drug use: Not on file    Sexual activity: Not on file       Medications  She has a current medication list which includes the following prescription(s): acetaminophen, amlodipine, cholecalciferol (vitamin d3), cyanocobalamin, hydralazine, hydrocortisone, losartan, polyethylene glycol, UNABLE TO FIND, vitamin e, and zolpidem.      Allergies  Review of patient's allergies indicates:   Allergen Reactions    Demerol [meperidine]      restless    Ciprofloxacin Nausea Only and Other (See Comments)     Severe dizziness        All medications, allergies, and past history have been reviewed.    Objective:      Vitals:  Vitals - 1 value per visit 6/13/2023 7/13/2023 7/13/2023   SYSTOLIC 120 - 110   DIASTOLIC 69 - 62   Pulse 80 - 77   Temp - - -   Resp - - -   SPO2 96 - -   Weight (lb) 139.33 - 135.36   Weight (kg) 63.2 - 61.4   Height - - 62   BMI (Calculated) 25.5 - 24.8   VISIT REPORT - - -   Pain Score  - 0 -       Body surface area is 1.64 meters squared.    Physical Exam:    GENERAL  APPEARANCE -  alert, appears stated age, and cooperative  BARRIER(S) TO COMMUNICATION -  none VOICE - appropriate for age and gender    INTEGUMENTARY  no suspicious head and neck lesions    HEENT  HEAD: Normocephalic, without obvious  abnormality, atraumatic  FACE: INSPECTION - Symmetric, no signs of trauma, no suspicious lesion(s)  PALPATION -  No masses SALIVARY GLANDS - non-tender with no appreciable mass  STRENGTH - facial symmetry  NECK/THYROID: normal atraumatic, no neck masses, normal thyroid, no jvd    EYES  Normal occular alignment and mobility with no visible nystagmus at rest    EARS/NOSE/MOUTH/THROAT  EARS  PINNAE AND EXTERNAL EARS - no suspicious lesion OTOSCOPIC EXAM (surgical microscopy was used for visualization/instrumentation): EAR EXAM - dryness, slight meatal scaling, some wax curetted clear on right, normal TM/ME's  HEARING - grossly intact to voice/finger rub    NOSE AND SINUSES  EXTERNAL NOSE - Grossly normal for age/sex  SEPTUM - normal/no obstruction on anterior exam without decongestion TURBINATES - within normal limits MUCOSA - within normal limits     MOUTH AND THROAT   ORAL CAVITY, LIPS, TEETH, GUMS & TONGUE - moist, no suspicious lesions  OROPHARYNX /TONSILS/PHARYNGEAL WALLS/HYPOPHARYNX - no erythema or exudates  NASOPHARYNX - limited mirror exam - unable to visualize due to anatomy/gag  LARYNX -  - limited mirror exam - unable to visualize due to anatomy/gag      CHEST AND LUNG   INSPECTION & AUSCULTATION - normal effort, no stridor    CARDIOVASCULAR  AUSCULTATION & PERIPHERAL VASCULAR - regular rate and rhythm.    NEUROLOGIC  MENTAL STATUS - alert, interactive CRANIAL NERVES - normal    HEAD THRUSTS - Catch up saccades absent bilaterally   FUKUDA - unsteady but no turn  RHOMBERG - Tandem (No) - unsteady  GAIT - 2. Mild impairment: Pivot turns safely in > 3 seconds and stops with no loss of balance    LYMPHATIC  HEAD AND NECK - non-palpable      Procedure(s):  None    Labs:  WBC   Date Value Ref Range Status   05/16/2023 5.83 3.90 - 12.70 K/uL Final     Hemoglobin   Date Value Ref Range Status   05/16/2023 11.4 (L) 12.0 - 16.0 g/dL Final     Platelets   Date Value Ref Range Status   05/16/2023 210 150 - 450 K/uL  Final     Creatinine   Date Value Ref Range Status   05/26/2023 1.13 (H) 0.60 - 0.95 mg/dL Final     TSH   Date Value Ref Range Status   07/01/2016 0.993 0.400 - 4.000 uIU/mL Final     Comment:     Warning:  Heterophilic antibodies in serum or plasma of   certain individuals are known to cause interference with   immunoassays. These antibodies may be present in blood samples   from individuals regularly exposed to animal or who have been   treated with animal products.        Glucose   Date Value Ref Range Status   05/26/2023 88 65 - 99 mg/dL Final     Comment:                   Fasting reference interval          Hemoglobin A1C   Date Value Ref Range Status   07/01/2016 5.6 0.0 - 5.6 % Final     Comment:     Reference Interval:  5.0 - 5.6 Normal   5.7 - 6.4 High Risk   > 6.5 Diabetic    Hgb A1c results are standardized based on the (NGSP) National   Glycohemoglobin Standardization Program.    Hemoglobin A1C levels are related to mean serum/plasma glucose   during the preceding 2-3 months.              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  Some degree of progressive disequilibrium enough aging as discussed which include some generalized worsening of inner ear function is favored but the more acute and balance/risk of fall issues seem to be related to numbness of the feet and slowing of nerve conduction as discussed.      B12 and folate levels ordered and follow-up with neurology recommended for further evaluation and treatment of neuropathy.  For core strengthening, gait improvement and fall prevention also recommended and ordered.      Routine ear care as outlined with derm otic oil as  outlined if needed for more significant itching.  Routine audiologic testing for ear fullness is further recommended.      Return with any worsening of symptoms, failure to improve, or any other concerns for further evaluation and treatment.        Voice recognition software was used in the creation of this note/communication and any sound-alike errors which may have occurred from its use should be taken in context when interpreting.  If such errors prevent a clear understanding of the note/communication, please contact the office for clarification.

## 2023-07-13 NOTE — Clinical Note
Audiogram was completed today. 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    Speech Reception Thresholds were  35 dBHL for the right ear and 30 dBHL for the left ear.    Word recognition scores were good for the right ear and good for the left ear.   Tympanograms were Type A for the right ear and Type A for the left ear.  Recommendations: 1) Follow up with ENT    2) Annual hearing evaluation    3) Hearing aid consult when ready Thank you, Misael Owens CCC-A

## 2023-07-25 ENCOUNTER — TELEPHONE (OUTPATIENT)
Dept: OTOLARYNGOLOGY | Facility: CLINIC | Age: 88
End: 2023-07-25
Payer: MEDICARE

## 2023-07-25 NOTE — TELEPHONE ENCOUNTER
Vitamin B12 and Folate labs from [facility] received and scanned into [facility]. Please advise. Thanks, Elena

## 2023-07-27 ENCOUNTER — TELEPHONE (OUTPATIENT)
Dept: OTOLARYNGOLOGY | Facility: CLINIC | Age: 88
End: 2023-07-27
Payer: MEDICARE

## 2023-07-27 NOTE — TELEPHONE ENCOUNTER
Contacted patient and informed her that Dr. Mcintosh is out of office for 2 weeks, results will be reviewed when he get backs. Labs results have been scanned into her chart since it was done at [facility].     Please review labs at your convenience.

## 2023-07-27 NOTE — TELEPHONE ENCOUNTER
----- Message from Lily Jhaveri sent at 7/27/2023 10:25 AM CDT -----  Contact: Patient  Type:  Test Results    Who Called:   Patient  Name of Test (Lab/Mammo/Etc):   Blood work  Date of Test:    Last Saturday  Ordering Provider:    Dr Mcintosh  Where the test was performed:   [facility]    Would the patient rather a call back or a response via [facility]?   Call back  Best Call Back Number:   [phone]    Additional Information:  States she would like to speak with someone in the office to go over her test results - please call - thank you

## 2023-07-28 DIAGNOSIS — Z95.0 PACEMAKER: Primary | ICD-10-CM

## 2023-07-31 ENCOUNTER — PATIENT MESSAGE (OUTPATIENT)
Dept: ADMINISTRATIVE | Facility: OTHER | Age: 88
End: 2023-07-31
Payer: MEDICARE

## 2023-08-17 ENCOUNTER — PATIENT MESSAGE (OUTPATIENT)
Dept: ADMINISTRATIVE | Facility: OTHER | Age: 88
End: 2023-08-17
Payer: MEDICARE

## 2023-09-05 ENCOUNTER — PATIENT MESSAGE (OUTPATIENT)
Dept: ADMINISTRATIVE | Facility: OTHER | Age: 88
End: 2023-09-05
Payer: MEDICARE

## 2023-09-05 ENCOUNTER — TELEPHONE (OUTPATIENT)
Dept: FAMILY MEDICINE | Facility: CLINIC | Age: 88
End: 2023-09-05

## 2023-09-05 NOTE — TELEPHONE ENCOUNTER
----- Message from Arelis Geller sent at 9/5/2023 11:45 AM CDT -----  Patient called and stated that she need to schedule an appointment with Denisse for her wellness check up, advised that she has an appointment coming up on 11/20 and she stated that she was sent a letter to come in for her wellness check. She want to know if the appointment that has scheduled ok please give her a call at [phone]

## 2023-11-20 ENCOUNTER — OFFICE VISIT (OUTPATIENT)
Dept: FAMILY MEDICINE | Facility: CLINIC | Age: 88
End: 2023-11-20
Payer: MEDICARE

## 2023-11-20 VITALS
OXYGEN SATURATION: 96 % | HEART RATE: 84 BPM | DIASTOLIC BLOOD PRESSURE: 68 MMHG | SYSTOLIC BLOOD PRESSURE: 120 MMHG | WEIGHT: 130.38 LBS | HEIGHT: 59 IN | BODY MASS INDEX: 26.28 KG/M2

## 2023-11-20 DIAGNOSIS — Z00.00 ENCOUNTER FOR PREVENTIVE HEALTH EXAMINATION: Primary | ICD-10-CM

## 2023-11-20 DIAGNOSIS — G47.00 INSOMNIA, UNSPECIFIED TYPE: ICD-10-CM

## 2023-11-20 DIAGNOSIS — I25.10 CORONARY ARTERY DISEASE INVOLVING NATIVE CORONARY ARTERY OF NATIVE HEART WITHOUT ANGINA PECTORIS: ICD-10-CM

## 2023-11-20 DIAGNOSIS — N18.31 STAGE 3A CHRONIC KIDNEY DISEASE: ICD-10-CM

## 2023-11-20 DIAGNOSIS — I10 ESSENTIAL HYPERTENSION: ICD-10-CM

## 2023-11-20 DIAGNOSIS — K21.9 GASTROESOPHAGEAL REFLUX DISEASE, UNSPECIFIED WHETHER ESOPHAGITIS PRESENT: ICD-10-CM

## 2023-11-20 DIAGNOSIS — N18.32 STAGE 3B CHRONIC KIDNEY DISEASE: ICD-10-CM

## 2023-11-20 DIAGNOSIS — Z95.0 PACEMAKER: ICD-10-CM

## 2023-11-20 PROCEDURE — G0439 PR MEDICARE ANNUAL WELLNESS SUBSEQUENT VISIT: ICD-10-PCS | Mod: S$GLB,,, | Performed by: NURSE PRACTITIONER

## 2023-11-20 PROCEDURE — G0439 PPPS, SUBSEQ VISIT: HCPCS | Mod: S$GLB,,, | Performed by: NURSE PRACTITIONER

## 2023-11-20 NOTE — PATIENT INSTRUCTIONS
Counseling and Referral of Other Preventative  (Italic type indicates deductible and co-insurance are waived)    Patient Name: Vee Cadena  Today's Date: 11/20/2023    Health Maintenance       Date Due Completion Date    TETANUS VACCINE Never done ---    Aspirin/Antiplatelet Therapy Never done ---    Shingles Vaccine (1 of 2) Never done ---    RSV Vaccine (Age 60+) (1 - 1-dose 60+ series) Never done ---    Pneumococcal Vaccines (Age 65+) (1 - PCV) Never done ---    Influenza Vaccine (1) 09/01/2023 10/15/2020    COVID-19 Vaccine (3 - 2023-24 season) 09/01/2023 4/17/2021    Lipid Panel 02/14/2028 2/14/2023        No orders of the defined types were placed in this encounter.    The following information is provided to all patients.  This information is to help you find resources for any of the problems found today that may be affecting your health:                Living healthy guide: www.[facility].[place].[facility]      Understanding Diabetes: www.diabetes.org      Eating healthy: www.cdc.gov/healthyweight      [facility] home safety checklist: www.cdc.gov/steadi/patient.html      Agency on Aging: www.goea.[place].[facility]      Alcoholics anonymous (AA): www.aa.org      Physical Activity: www.diony.nih.gov/gv4pytp      Tobacco use: www.quitwithusla.org

## 2023-11-20 NOTE — PROGRESS NOTES
"  Vee Cadena presented for a  Medicare AWV and comprehensive Health Risk Assessment today. The following components were reviewed and updated:    Medical history  Family History  Social history  Allergies and Current Medications  Health Risk Assessment  Health Maintenance  Care Team         ** See Completed Assessments for Annual Wellness Visit within the encounter summary.**         The following assessments were completed:  Living Situation  CAGE  Depression Screening  Timed Get Up and Go  Whisper Test  Cognitive Function Screening  Nutrition Screening  ADL Screening  PAQ Screening        Vitals:    11/20/23 1102   BP: 120/68   Pulse: 84   SpO2: 96%   Weight: 59.1 kg (130 lb 6.4 oz)   Height: 4' 11" (1.499 m)     Body mass index is 26.34 kg/m².  Physical Exam  Constitutional:       Appearance: She is well-developed.   HENT:      Head: Normocephalic.      Right Ear: External ear normal.      Left Ear: External ear normal.   Eyes:      Conjunctiva/sclera: Conjunctivae normal.      Pupils: Pupils are equal, round, and reactive to light.   Neck:      Vascular: No JVD.   Cardiovascular:      Rate and Rhythm: Normal rate and regular rhythm.      Heart sounds: No murmur heard.  Pulmonary:      Effort: Pulmonary effort is normal.      Breath sounds: Normal breath sounds.   Abdominal:      General: Bowel sounds are normal.      Palpations: Abdomen is soft.   Musculoskeletal:         General: No deformity. Normal range of motion.      Cervical back: Normal range of motion and neck supple.   Lymphadenopathy:      Cervical: No cervical adenopathy.   Skin:     General: Skin is warm and dry.      Findings: No rash.   Neurological:      Mental Status: She is alert and oriented to person, place, and time.      Gait: Gait normal.   Psychiatric:         Speech: Speech normal.         Behavior: Behavior normal.               Diagnoses and health risks identified today and associated recommendations/orders:    1. Encounter for " preventive health examination  completed    2. Essential hypertension  Bp is well controlled    3. Pacemaker  Followed by cardio    4. Stage 3b chronic kidney disease  Discussed nsaid avoidance    5. Gastroesophageal reflux disease, unspecified whether esophagitis present  Discussed gerd diet    6. Coronary artery disease involving native coronary artery of native heart without angina pectoris  Followed by cardio    7. Insomnia, unspecified type  Ambien prn    8. Stage 3a chronic kidney disease  discussed      Provided Vee with a 5-10 year written screening schedule and personal prevention plan. Recommendations were developed using the USPSTF age appropriate recommendations. Education, counseling, and referrals were provided as needed. After Visit Summary printed and given to patient which includes a list of additional screenings\tests needed.    Follow up in about 3 months (around 2/20/2024), or if symptoms worsen or fail to improve, for medication management.    Denisse Beltrán, FAISAL  I offered to discuss advanced care planning, including how to pick a person who would make decisions for you if you were unable to make them for yourself, called a health care power of , and what kind of decisions you might make such as use of life sustaining treatments such as ventilators and tube feeding when faced with a life limiting illness recorded on a living will that they will need to know. (How you want to be cared for as you near the end of your natural life)     X Patient is interested in learning more about how to make advanced directives.  I provided them paperwork and offered to discuss this with them.

## 2024-01-01 ENCOUNTER — NURSE TRIAGE (OUTPATIENT)
Dept: ADMINISTRATIVE | Facility: CLINIC | Age: 89
End: 2024-01-01
Payer: MEDICARE

## 2024-01-01 NOTE — TELEPHONE ENCOUNTER
Attempted to contact the patient x 2. No response x 2.  Reason for Disposition   Second attempt to contact caller AND no contact made. Phone number verified.    Protocols used: No Contact or Duplicate Contact Call-A-AH

## 2024-01-02 ENCOUNTER — NURSE TRIAGE (OUTPATIENT)
Dept: ADMINISTRATIVE | Facility: CLINIC | Age: 89
End: 2024-01-02
Payer: MEDICARE

## 2024-01-02 NOTE — TELEPHONE ENCOUNTER
"Spoke with pt and informed her of below per Denisse. She voiced understanding.   Pt states "naturally as someone with a head cold she still feels lousy, but feels better today." States she is coughing a lot less.    "

## 2024-01-02 NOTE — TELEPHONE ENCOUNTER
has phenylepherine potentially can raise bp  Stick with plain mucinex  Coricidin hbp    How is she feeling?

## 2024-01-02 NOTE — TELEPHONE ENCOUNTER
Vee states she has a bad head cold and would like to know if [facility] [place] plus is compatible BP meds Amlodipine and Losartan. Pt not wanting symptoms triaged, only has compatibility medication question. Advised pt per triage protocol to call Pharmacist within next 24 hours. V/u.   Reason for Disposition   Caller has medication question about med NOT prescribed by PCP and triager unable to answer question (e.g., compatibility with other med, storage)    Protocols used: Medication Question Call-A-OH

## 2024-02-07 ENCOUNTER — TELEPHONE (OUTPATIENT)
Dept: FAMILY MEDICINE | Facility: CLINIC | Age: 89
End: 2024-02-07
Payer: MEDICARE

## 2024-02-07 DIAGNOSIS — I10 ESSENTIAL HYPERTENSION: ICD-10-CM

## 2024-02-07 DIAGNOSIS — I25.10 CORONARY ARTERY DISEASE, UNSPECIFIED VESSEL OR LESION TYPE, UNSPECIFIED WHETHER ANGINA PRESENT, UNSPECIFIED WHETHER NATIVE OR TRANSPLANTED HEART: ICD-10-CM

## 2024-02-07 DIAGNOSIS — Z79.899 HIGH RISK MEDICATION USE: Primary | ICD-10-CM

## 2024-02-07 DIAGNOSIS — N18.32 STAGE 3B CHRONIC KIDNEY DISEASE: ICD-10-CM

## 2024-02-07 NOTE — TELEPHONE ENCOUNTER
----- Message from Sis Carty sent at 2/7/2024 11:26 AM CST -----  Regarding: appt  Pt was told to make an appt for next thurs call her please  [phone] please and thanks kbb     
Spoke to pt and he has been scheduled 2/20  
No

## 2024-02-10 LAB
ALBUMIN SERPL-MCNC: 3.9 G/DL (ref 3.6–5.1)
ALBUMIN/CREAT UR: 55 MCG/MG CREAT
ALBUMIN/GLOB SERPL: 1.6 (CALC) (ref 1–2.5)
ALP SERPL-CCNC: 60 U/L (ref 37–153)
ALT SERPL-CCNC: 11 U/L (ref 6–29)
APPEARANCE UR: ABNORMAL
AST SERPL-CCNC: 13 U/L (ref 10–35)
BACTERIA #/AREA URNS HPF: ABNORMAL /HPF
BACTERIA UR CULT: ABNORMAL
BACTERIA UR CULT: ABNORMAL
BASOPHILS # BLD AUTO: 28 CELLS/UL (ref 0–200)
BASOPHILS NFR BLD AUTO: 0.5 %
BILIRUB SERPL-MCNC: 0.7 MG/DL (ref 0.2–1.2)
BILIRUB UR QL STRIP: NEGATIVE
BUN SERPL-MCNC: 19 MG/DL (ref 7–25)
BUN/CREAT SERPL: 16 (CALC) (ref 6–22)
CALCIUM SERPL-MCNC: 8.6 MG/DL (ref 8.6–10.4)
CHLORIDE SERPL-SCNC: 98 MMOL/L (ref 98–110)
CHOLEST SERPL-MCNC: 137 MG/DL
CHOLEST/HDLC SERPL: 2.1 (CALC)
CO2 SERPL-SCNC: 26 MMOL/L (ref 20–32)
COLOR UR: YELLOW
CREAT SERPL-MCNC: 1.18 MG/DL (ref 0.6–0.95)
CREAT UR-MCNC: 38 MG/DL (ref 20–275)
EGFR: 44 ML/MIN/1.73M2
EOSINOPHIL # BLD AUTO: 88 CELLS/UL (ref 15–500)
EOSINOPHIL NFR BLD AUTO: 1.6 %
ERYTHROCYTE [DISTWIDTH] IN BLOOD BY AUTOMATED COUNT: 12.1 % (ref 11–15)
GLOBULIN SER CALC-MCNC: 2.4 G/DL (CALC) (ref 1.9–3.7)
GLUCOSE SERPL-MCNC: 105 MG/DL (ref 65–139)
GLUCOSE UR QL STRIP: NEGATIVE
HCT VFR BLD AUTO: 36.8 % (ref 35–45)
HDLC SERPL-MCNC: 65 MG/DL
HGB BLD-MCNC: 12.2 G/DL (ref 11.7–15.5)
HGB UR QL STRIP: ABNORMAL
HYALINE CASTS #/AREA URNS LPF: ABNORMAL /LPF
KETONES UR QL STRIP: NEGATIVE
LDLC SERPL CALC-MCNC: 58 MG/DL (CALC)
LEUKOCYTE ESTERASE UR QL STRIP: ABNORMAL
LYMPHOCYTES # BLD AUTO: 1238 CELLS/UL (ref 850–3900)
LYMPHOCYTES NFR BLD AUTO: 22.5 %
MCH RBC QN AUTO: 29.9 PG (ref 27–33)
MCHC RBC AUTO-ENTMCNC: 33.2 G/DL (ref 32–36)
MCV RBC AUTO: 90.2 FL (ref 80–100)
MICROALBUMIN UR-MCNC: 2.1 MG/DL
MONOCYTES # BLD AUTO: 501 CELLS/UL (ref 200–950)
MONOCYTES NFR BLD AUTO: 9.1 %
NEUTROPHILS # BLD AUTO: 3647 CELLS/UL (ref 1500–7800)
NEUTROPHILS NFR BLD AUTO: 66.3 %
NITRITE UR QL STRIP: NEGATIVE
NONHDLC SERPL-MCNC: 72 MG/DL (CALC)
PH UR STRIP: 7 [PH] (ref 5–8)
PLATELET # BLD AUTO: 223 THOUSAND/UL (ref 140–400)
PMV BLD REES-ECKER: 9.9 FL (ref 7.5–12.5)
POTASSIUM SERPL-SCNC: 4 MMOL/L (ref 3.5–5.3)
PROT SERPL-MCNC: 6.3 G/DL (ref 6.1–8.1)
PROT UR QL STRIP: NEGATIVE
RBC # BLD AUTO: 4.08 MILLION/UL (ref 3.8–5.1)
RBC #/AREA URNS HPF: ABNORMAL /HPF
SERVICE CMNT-IMP: ABNORMAL
SODIUM SERPL-SCNC: 133 MMOL/L (ref 135–146)
SP GR UR STRIP: 1.01 (ref 1–1.03)
SQUAMOUS #/AREA URNS HPF: ABNORMAL /HPF
TRIGL SERPL-MCNC: 65 MG/DL
TSH SERPL-ACNC: 1.74 MIU/L (ref 0.4–4.5)
WBC # BLD AUTO: 5.5 THOUSAND/UL (ref 3.8–10.8)
WBC #/AREA URNS HPF: ABNORMAL /HPF

## 2024-02-12 ENCOUNTER — TELEPHONE (OUTPATIENT)
Dept: FAMILY MEDICINE | Facility: CLINIC | Age: 89
End: 2024-02-12
Payer: MEDICARE

## 2024-02-12 RX ORDER — NITROFURANTOIN 25; 75 MG/1; MG/1
100 CAPSULE ORAL 2 TIMES DAILY
Qty: 10 CAPSULE | Refills: 0 | Status: SHIPPED | OUTPATIENT
Start: 2024-02-12 | End: 2024-02-17

## 2024-02-20 ENCOUNTER — OFFICE VISIT (OUTPATIENT)
Dept: FAMILY MEDICINE | Facility: CLINIC | Age: 89
End: 2024-02-20
Payer: MEDICARE

## 2024-02-20 VITALS
HEART RATE: 74 BPM | DIASTOLIC BLOOD PRESSURE: 60 MMHG | BODY MASS INDEX: 26.81 KG/M2 | OXYGEN SATURATION: 97 % | HEIGHT: 59 IN | SYSTOLIC BLOOD PRESSURE: 120 MMHG | WEIGHT: 133 LBS

## 2024-02-20 DIAGNOSIS — N18.31 STAGE 3A CHRONIC KIDNEY DISEASE: ICD-10-CM

## 2024-02-20 DIAGNOSIS — M54.41 LOW BACK PAIN WITH BILATERAL SCIATICA, UNSPECIFIED BACK PAIN LATERALITY, UNSPECIFIED CHRONICITY: Primary | ICD-10-CM

## 2024-02-20 DIAGNOSIS — I44.2 THIRD DEGREE AV BLOCK: ICD-10-CM

## 2024-02-20 DIAGNOSIS — M54.42 LOW BACK PAIN WITH BILATERAL SCIATICA, UNSPECIFIED BACK PAIN LATERALITY, UNSPECIFIED CHRONICITY: Primary | ICD-10-CM

## 2024-02-20 DIAGNOSIS — B35.1 TOENAIL FUNGUS: ICD-10-CM

## 2024-02-20 DIAGNOSIS — G47.00 INSOMNIA, UNSPECIFIED TYPE: ICD-10-CM

## 2024-02-20 DIAGNOSIS — I27.20 HYPERTENSIVE PULMONARY VASCULAR DISEASE: ICD-10-CM

## 2024-02-20 DIAGNOSIS — I10 ESSENTIAL HYPERTENSION: ICD-10-CM

## 2024-02-20 DIAGNOSIS — Z95.0 PACEMAKER: ICD-10-CM

## 2024-02-20 DIAGNOSIS — G62.9 NEUROPATHY: ICD-10-CM

## 2024-02-20 PROCEDURE — 99214 OFFICE O/P EST MOD 30 MIN: CPT | Mod: S$GLB,,, | Performed by: NURSE PRACTITIONER

## 2024-02-20 RX ORDER — MELATONIN 5 MG
5 CAPSULE ORAL NIGHTLY
Qty: 30 CAPSULE | Refills: 0 | Status: SHIPPED | OUTPATIENT
Start: 2024-02-20

## 2024-02-20 RX ORDER — BUTYROSPERMUM PARKII(SHEA BUTTER), SIMMONDSIA CHINENSIS (JOJOBA) SEED OIL, ALOE BARBADENSIS LEAF EXTRACT .01; 1; 3.5 G/100G; G/100G; G/100G
250 LIQUID TOPICAL 2 TIMES DAILY
Qty: 60 CAPSULE | Refills: 2 | Status: SHIPPED | OUTPATIENT
Start: 2024-02-20

## 2024-02-20 RX ORDER — CICLOPIROX 80 MG/ML
SOLUTION TOPICAL NIGHTLY
Qty: 6.6 ML | Refills: 2 | Status: SHIPPED | OUTPATIENT
Start: 2024-02-20

## 2024-02-20 RX ORDER — GABAPENTIN 100 MG/1
100 CAPSULE ORAL EVERY 6 HOURS PRN
COMMUNITY

## 2024-02-20 RX ORDER — TRAZODONE HYDROCHLORIDE 50 MG/1
50 TABLET ORAL NIGHTLY
Qty: 30 TABLET | Refills: 11 | Status: SHIPPED | OUTPATIENT
Start: 2024-02-20 | End: 2025-02-19

## 2024-02-20 NOTE — PROGRESS NOTES
SUBJECTIVE:    Patient ID: Vee Cadena is a 89 y.o. female.    Chief Complaint: Follow-up (No bottles//pt here for follow up//discuss gabapentin, bulge on left lower abdomen, sleep aide, toe fungus, and balance of nature vitamin//declines flu vacc//JL)    89 year old female presents for check up.   Living alone. Treated for htn, pacemaker, cad, and insomnia.  followedby dr. Jett eldridge. Bp in office is well controlled. Checks occasionally at home. Gets similar readings. No chest pain. No sob. Concerned about toenails. Reports that seem to be discolored. Noticed bulge in abdomen. No pain. No nvd. Would like to discuss. Has been having intermittent back pain. Denies injury or trauma. Worse after being on feet . No pain with urination. Last labs 2/7        Telephone on 02/07/2024   Component Date Value Ref Range Status    Color, UA 02/07/2024 YELLOW  YELLOW Final    Appearance, UA 02/07/2024 CLOUDY (A)  CLEAR Final    Specific Gravity, UA 02/07/2024 1.007  1.001 - 1.035 Final    pH, UA 02/07/2024 7.0  5.0 - 8.0 Final    Glucose, UA 02/07/2024 NEGATIVE  NEGATIVE Final    Bilirubin, UA 02/07/2024 NEGATIVE  NEGATIVE Final    Ketones, UA 02/07/2024 NEGATIVE  NEGATIVE Final    Occult Blood UA 02/07/2024 TRACE (A)  NEGATIVE Final    Protein, UA 02/07/2024 NEGATIVE  NEGATIVE Final    Nitrite, UA 02/07/2024 NEGATIVE  NEGATIVE Final    Leukocytes, UA 02/07/2024 3+ (A)  NEGATIVE Final    WBC Casts, UA 02/07/2024 > OR = 60 (A)  < OR = 5 /HPF Final    RBC Casts, UA 02/07/2024 NONE SEEN  < OR = 2 /HPF Final    Squam Epithel, UA 02/07/2024 NONE SEEN  < OR = 5 /HPF Final    Bacteria, UA 02/07/2024 MODERATE (A)  NONE SEEN /HPF Final    Hyaline Casts, UA 02/07/2024 0-5 (A)  NONE SEEN /LPF Final    Service Cmt: 02/07/2024    Final    Reflexive Urine Culture 02/07/2024    Final    Urine Culture, Routine 02/07/2024  (A)   Final    TSH w/reflex to FT4 02/07/2024 1.74  0.40 - 4.50 mIU/L Final    Creatinine, Urine 02/07/2024 38  20 -  275 mg/dL Final    Microalb, Ur 02/07/2024 2.1  See Note: mg/dL Final    Microalb/Creat Ratio 02/07/2024 55 (H)  <30 mcg/mg creat Final    Cholesterol 02/07/2024 137  <200 mg/dL Final    HDL 02/07/2024 65  > OR = 50 mg/dL Final    Triglycerides 02/07/2024 65  <150 mg/dL Final    LDL Cholesterol 02/07/2024 58  mg/dL (calc) Final    HDL/Cholesterol Ratio 02/07/2024 2.1  <5.0 (calc) Final    Non HDL Chol. (LDL+VLDL) 02/07/2024 72  <130 mg/dL (calc) Final    Glucose 02/07/2024 105  65 - 139 mg/dL Final    BUN 02/07/2024 19  7 - 25 mg/dL Final    Creatinine 02/07/2024 1.18 (H)  0.60 - 0.95 mg/dL Final    eGFR 02/07/2024 44 (L)  > OR = 60 mL/min/1.73m2 Final    BUN/Creatinine Ratio 02/07/2024 16  6 - 22 (calc) Final    Sodium 02/07/2024 133 (L)  135 - 146 mmol/L Final    Potassium 02/07/2024 4.0  3.5 - 5.3 mmol/L Final    Chloride 02/07/2024 98  98 - 110 mmol/L Final    CO2 02/07/2024 26  20 - 32 mmol/L Final    Calcium 02/07/2024 8.6  8.6 - 10.4 mg/dL Final    Total Protein 02/07/2024 6.3  6.1 - 8.1 g/dL Final    Albumin 02/07/2024 3.9  3.6 - 5.1 g/dL Final    Globulin, Total 02/07/2024 2.4  1.9 - 3.7 g/dL (calc) Final    Albumin/Globulin Ratio 02/07/2024 1.6  1.0 - 2.5 (calc) Final    Total Bilirubin 02/07/2024 0.7  0.2 - 1.2 mg/dL Final    Alkaline Phosphatase 02/07/2024 60  37 - 153 U/L Final    AST 02/07/2024 13  10 - 35 U/L Final    ALT 02/07/2024 11  6 - 29 U/L Final    WBC 02/07/2024 5.5  3.8 - 10.8 Thousand/uL Final    RBC 02/07/2024 4.08  3.80 - 5.10 Million/uL Final    Hemoglobin 02/07/2024 12.2  11.7 - 15.5 g/dL Final    Hematocrit 02/07/2024 36.8  35.0 - 45.0 % Final    MCV 02/07/2024 90.2  80.0 - 100.0 fL Final    MCH 02/07/2024 29.9  27.0 - 33.0 pg Final    MCHC 02/07/2024 33.2  32.0 - 36.0 g/dL Final    RDW 02/07/2024 12.1  11.0 - 15.0 % Final    Platelets 02/07/2024 223  140 - 400 Thousand/uL Final    MPV 02/07/2024 9.9  7.5 - 12.5 fL Final    Neutrophils, Abs 02/07/2024 3,647  1,500 - 7,800 cells/uL  Final    Lymph # 02/07/2024 1,238  850 - 3,900 cells/uL Final    Mono # 02/07/2024 501  200 - 950 cells/uL Final    Eos # 02/07/2024 88  15 - 500 cells/uL Final    Baso # 02/07/2024 28  0 - 200 cells/uL Final    Neutrophils Relative 02/07/2024 66.3  % Final    Lymph % 02/07/2024 22.5  % Final    Mono % 02/07/2024 9.1  % Final    Eosinophil % 02/07/2024 1.6  % Final    Basophil % 02/07/2024 0.5  % Final       Past Medical History:   Diagnosis Date    Back ache     Back pain     Carotid bruit     Cervical disc disease     Chest pain     Coronary artery disease     Diffuse cystic mastopathy     Elevated blood pressure     Encounter for blood transfusion     Foot pain, left     Generalized osteoarthrosis     Herpes zoster without complications     Hypertension     Neck pain     Neuropathy     Osteoporosis     Palpitations     PONV (postoperative nausea and vomiting)     TIA (transient ischemic attack)      Social History     Socioeconomic History    Marital status:     Number of children: 6   Tobacco Use    Smoking status: Never    Smokeless tobacco: Never   Substance and Sexual Activity    Alcohol use: Not Currently     Comment: socially if ever   Social History Narrative    Sleeps ok. Takes melatonin 5mg . About 7hrs    2 days per week     Walks 3-4 days per week     Past Surgical History:   Procedure Laterality Date    APPENDECTOMY      bladder and colon lift and retached      CARDIAC CATHETERIZATION      CATARACT EXTRACTION  02/23/2009    CHOLECYSTECTOMY      COLONOSCOPY Left 9/6/2021    Procedure: COLONOSCOPY;  Surgeon: Erlin Lainez MD;  Location: [facility];  Service: Endoscopy;  Laterality: Left;    ESOPHAGOGASTRODUODENOSCOPY Left 9/6/2021    Procedure: EGD (ESOPHAGOGASTRODUODENOSCOPY);  Surgeon: Erlin Lainez MD;  Location: [facility];  Service: Endoscopy;  Laterality: Left;    INSERTION OF PACEMAKER  12/2019    pt reported    TOTAL ABDOMINAL HYSTERECTOMY      TUBAL LIGATION       Family  History   Problem Relation Age of Onset    Heart disease Father     Thyroid disease Father     Diabetes Father     Stroke Father     Diabetes Brother     Hypertension Brother     Hyperlipidemia Brother     Heart disease Brother     Cancer Brother         lung    Hypertension Sister     Diabetes Sister     Heart disease Sister     Hyperlipidemia Sister     Dementia Mother     Down syndrome Daughter     COPD Daughter        All of your core healthy metrics are met.      Review of patient's allergies indicates:   Allergen Reactions    Demerol [meperidine]      restless    Ciprofloxacin Nausea Only and Other (See Comments)     Severe dizziness        Current Outpatient Medications:     acetaminophen (TYLENOL) 500 MG tablet, Take 500 mg by mouth as needed for Pain. PRN, Disp: , Rfl:     amLODIPine (NORVASC) 2.5 MG tablet, Take 1 tablet (2.5 mg total) by mouth 2 (two) times daily., Disp: 180 tablet, Rfl: 3    cholecalciferol, vitamin D3, (VITAMIN D3) 25 mcg (1,000 unit) capsule, Take 1,000 Units by mouth once daily., Disp: , Rfl:     cyanocobalamin 500 MCG tablet, Take 500 mcg by mouth once daily., Disp: , Rfl:     fluocinolone acetonide oiL 0.01 % Drop, 3-4 drops to the affected ear(s) twice daily no more than a week at a time as needed for itching and scaling, Disp: 20 mL, Rfl: 0    FOLIC ACID ORAL, Take by mouth., Disp: , Rfl:     gabapentin (NEURONTIN) 100 MG capsule, Take 100 mg by mouth every 6 (six) hours as needed., Disp: , Rfl:     hydrALAZINE (APRESOLINE) 10 MG tablet, Take 1 tablet (10 mg total) by mouth daily as needed (TAKE 1 TAB PO PRN EVERY 2 HOURS FOR SBP>180 OR DBP>100)., Disp: 30 tablet, Rfl: 11    hydrocortisone 2.5 % cream, Apply topically 2 (two) times daily., Disp: 3.5 g, Rfl: 1    losartan (COZAAR) 50 MG tablet, Take 1 tablet (50 mg total) by mouth 2 (two) times a day., Disp: 180 tablet, Rfl: 3    UNABLE TO FIND, Apply 3 Pump topically 5 (five) times daily. Ketoprofen/Cyclobenzaprine/Lidocaine Hcl  "(lipomax) 10/2/5%  APPLY UP TO 4.8 GRAMS (3 PUMPS) TO PAINFUL AREAS UP TO FIVE TIMES DAILY. RUB IN WELL, Disp: , Rfl:     ciclopirox (PENLAC) 8 % Soln, Apply topically nightly., Disp: 6.6 mL, Rfl: 2    melatonin 5 mg Cap, Take 1 capsule (5 mg total) by mouth every evening., Disp: 30 capsule, Rfl: 0    polyethylene glycol (GLYCOLAX) 17 gram/dose powder, Take 17 g by mouth once daily., Disp: , Rfl:     Saccharomyces boulardii (FLORASTOR) 250 mg capsule, Take 1 capsule (250 mg total) by mouth 2 (two) times daily., Disp: 60 capsule, Rfl: 2    traZODone (DESYREL) 50 MG tablet, Take 1 tablet (50 mg total) by mouth every evening., Disp: 30 tablet, Rfl: 11    vitamin E 400 UNIT capsule, Take 400 Units by mouth once daily., Disp: , Rfl:     Review of Systems   Constitutional:  Negative for chills, fever and unexpected weight change.   HENT:  Negative for ear pain, rhinorrhea and sore throat.    Eyes:  Negative for pain and visual disturbance.   Respiratory:  Negative for cough and shortness of breath.    Cardiovascular:  Negative for chest pain, palpitations and leg swelling.   Gastrointestinal:  Negative for abdominal pain, diarrhea, nausea and vomiting.   Genitourinary:  Negative for difficulty urinating, hematuria and vaginal bleeding.   Musculoskeletal:  Positive for back pain. Negative for arthralgias.   Skin:  Negative for rash.        Discolored toenails   Neurological:  Negative for dizziness, weakness and headaches.   Psychiatric/Behavioral:  Negative for agitation and sleep disturbance. The patient is not nervous/anxious.           Objective:      Vitals:    02/20/24 1205   BP: 120/60   Pulse: 74   SpO2: 97%   Weight: 60.3 kg (133 lb)   Height: 4' 11" (1.499 m)     Physical Exam  Vitals and nursing note reviewed.   Constitutional:       General: She is not in acute distress.     Appearance: Normal appearance. She is well-developed.   HENT:      Head: Normocephalic.      Right Ear: External ear normal.      Left " Ear: External ear normal.   Eyes:      Conjunctiva/sclera: Conjunctivae normal.      Pupils: Pupils are equal, round, and reactive to light.   Neck:      Vascular: No JVD.   Cardiovascular:      Rate and Rhythm: Normal rate and regular rhythm.      Heart sounds: No murmur heard.  Pulmonary:      Effort: Pulmonary effort is normal.      Breath sounds: Normal breath sounds.   Abdominal:      General: Bowel sounds are normal.      Palpations: Abdomen is soft.   Musculoskeletal:         General: No deformity.      Cervical back: Normal range of motion and neck supple.      Lumbar back: Decreased range of motion. Negative right straight leg raise test and negative left straight leg raise test.   Feet:      Right foot:      Toenail Condition: Fungal disease present.     Left foot:      Toenail Condition: Fungal disease present.  Lymphadenopathy:      Cervical: No cervical adenopathy.   Skin:     General: Skin is warm and dry.      Findings: No rash.   Neurological:      Mental Status: She is alert and oriented to person, place, and time.      Gait: Gait normal.   Psychiatric:         Speech: Speech normal.         Behavior: Behavior normal.           Assessment:       1. Low back pain with bilateral sciatica, unspecified back pain laterality, unspecified chronicity    2. Hypertensive pulmonary vascular disease    3. Third degree AV block    4. Stage 3a chronic kidney disease    5. Essential hypertension    6. Pacemaker    7. Insomnia, unspecified type    8. Neuropathy    9. Toenail fungus         Plan:       Low back pain with bilateral sciatica, unspecified back pain laterality, unspecified chronicity  Comments:  refer to [facility]  Orders:  -     Ambulatory referral/consult to [facility]; Future; Expected date: 02/27/2024    Hypertensive pulmonary vascular disease  Comments:  bp controlled    Third degree AV block  Comments:  followed by cardio    Stage 3a chronic kidney  disease  Comments:  stable    Essential hypertension  Comments:  bp is well controlled    Pacemaker  Comments:  cardio    Insomnia, unspecified type  Comments:  try trazodone    Neuropathy  Comments:  try gabapentin    Toenail fungus  Comments:  penlac    Other orders  -     Saccharomyces boulardii (FLORASTOR) 250 mg capsule; Take 1 capsule (250 mg total) by mouth 2 (two) times daily.  Dispense: 60 capsule; Refill: 2  -     traZODone (DESYREL) 50 MG tablet; Take 1 tablet (50 mg total) by mouth every evening.  Dispense: 30 tablet; Refill: 11  -     melatonin 5 mg Cap; Take 1 capsule (5 mg total) by mouth every evening.  Dispense: 30 capsule; Refill: 0  -     ciclopirox (PENLAC) 8 % Soln; Apply topically nightly.  Dispense: 6.6 mL; Refill: 2      Follow up in about 3 months (around 5/20/2024), or if symptoms worsen or fail to improve, for medication management.        3/11/2024 Denisse Beltrán

## 2024-03-11 ENCOUNTER — TELEPHONE (OUTPATIENT)
Dept: FAMILY MEDICINE | Facility: CLINIC | Age: 89
End: 2024-03-11
Payer: MEDICARE

## 2024-03-11 PROBLEM — N18.32 STAGE 3B CHRONIC KIDNEY DISEASE: Status: RESOLVED | Noted: 2023-02-08 | Resolved: 2024-03-11

## 2024-03-11 PROBLEM — I44.2 THIRD DEGREE AV BLOCK: Status: ACTIVE | Noted: 2024-03-11

## 2024-03-11 PROBLEM — I27.20 HYPERTENSIVE PULMONARY VASCULAR DISEASE: Status: ACTIVE | Noted: 2024-03-11

## 2024-03-11 NOTE — TELEPHONE ENCOUNTER
----- Message from Imani Phelps sent at 3/11/2024  2:05 PM CDT -----  Pt has not heard from [facility]   [phone]

## 2024-04-02 ENCOUNTER — TELEPHONE (OUTPATIENT)
Dept: FAMILY MEDICINE | Facility: CLINIC | Age: 89
End: 2024-04-02
Payer: MEDICARE

## 2024-04-02 NOTE — TELEPHONE ENCOUNTER
----- Message from Robyn Acuña sent at 4/2/2024  9:21 AM CDT -----  Pt would to either have an appointment schedule or a prescription for something. She has a upset stomach and sever gas pains.    [phone]

## 2024-04-02 NOTE — TELEPHONE ENCOUNTER
Spoke to pt and she stated she is having a lot of gas in her stomach . And some pain. Last bowel movement this morning normal stool. No nausea or vomiting. Pt has tried probiotic. Pt has tried Gas X when the gas started Saturday but did not help a lot it helped some but it came back. Pt stated she tried Pepto and it helped a lot but it does not get rid of it.

## 2024-04-03 ENCOUNTER — OFFICE VISIT (OUTPATIENT)
Dept: FAMILY MEDICINE | Facility: CLINIC | Age: 89
End: 2024-04-03
Payer: MEDICARE

## 2024-04-03 VITALS
DIASTOLIC BLOOD PRESSURE: 62 MMHG | OXYGEN SATURATION: 98 % | SYSTOLIC BLOOD PRESSURE: 132 MMHG | HEART RATE: 83 BPM | WEIGHT: 129 LBS | HEIGHT: 59 IN | BODY MASS INDEX: 26 KG/M2

## 2024-04-03 DIAGNOSIS — R14.0 FLATULENCE/GAS PAIN/BELCHING: ICD-10-CM

## 2024-04-03 DIAGNOSIS — Z95.0 PACEMAKER: ICD-10-CM

## 2024-04-03 DIAGNOSIS — K21.00 GASTROESOPHAGEAL REFLUX DISEASE WITH ESOPHAGITIS WITHOUT HEMORRHAGE: Primary | ICD-10-CM

## 2024-04-03 DIAGNOSIS — K57.92 DIVERTICULITIS: ICD-10-CM

## 2024-04-03 DIAGNOSIS — I10 ESSENTIAL HYPERTENSION: ICD-10-CM

## 2024-04-03 PROCEDURE — 99214 OFFICE O/P EST MOD 30 MIN: CPT | Mod: S$GLB,,, | Performed by: NURSE PRACTITIONER

## 2024-04-03 RX ORDER — METRONIDAZOLE 500 MG/1
500 TABLET ORAL 3 TIMES DAILY
Qty: 21 TABLET | Refills: 0 | Status: SHIPPED | OUTPATIENT
Start: 2024-04-03

## 2024-04-03 RX ORDER — AMOXICILLIN AND CLAVULANATE POTASSIUM 875; 125 MG/1; MG/1
1 TABLET, FILM COATED ORAL 2 TIMES DAILY
Qty: 14 TABLET | Refills: 0 | Status: SHIPPED | OUTPATIENT
Start: 2024-04-03

## 2024-04-03 RX ORDER — SIMETHICONE 125 MG
125 TABLET,CHEWABLE ORAL EVERY 6 HOURS PRN
Qty: 30 TABLET | Refills: 0 | Status: SHIPPED | OUTPATIENT
Start: 2024-04-03 | End: 2024-04-15 | Stop reason: SDUPTHER

## 2024-04-03 NOTE — PROGRESS NOTES
SUBJECTIVE:    Patient ID: Vee Cadena is a 89 y.o. female.    Chief Complaint: Abdominal Pain (No bottle//Pt c/o gas pains since Saturday. Got worse Sunday morning. States she's had them before but not this bad. Has tried gas-x and pepto. Ate little yesterday and pains went away a little however she ate a piece of toast this AM and they started to worsen//JL)    89 year old female presents for urgent visit.  Living alone. Treated for htn, pacemaker, cad, and insomnia.  followedby dr. Jett eldridge. Bp in office is well controlled.today is complaining about ongoing abdominal /gas pain. Reports that symptoms started last Thursday. No fever. Some decrease in appetite. Has noticed increase in gas and bloating. Did have diarrhea yesterday am. No vomiting.has not taken anything.     Abdominal Pain  Associated symptoms include nausea. Pertinent negatives include no fever or hematuria.       Telephone on 02/07/2024   Component Date Value Ref Range Status    Color, UA 02/07/2024 YELLOW  YELLOW Final    Appearance, UA 02/07/2024 CLOUDY (A)  CLEAR Final    Specific Gravity, UA 02/07/2024 1.007  1.001 - 1.035 Final    pH, UA 02/07/2024 7.0  5.0 - 8.0 Final    Glucose, UA 02/07/2024 NEGATIVE  NEGATIVE Final    Bilirubin, UA 02/07/2024 NEGATIVE  NEGATIVE Final    Ketones, UA 02/07/2024 NEGATIVE  NEGATIVE Final    Occult Blood UA 02/07/2024 TRACE (A)  NEGATIVE Final    Protein, UA 02/07/2024 NEGATIVE  NEGATIVE Final    Nitrite, UA 02/07/2024 NEGATIVE  NEGATIVE Final    Leukocytes, UA 02/07/2024 3+ (A)  NEGATIVE Final    WBC Casts, UA 02/07/2024 > OR = 60 (A)  < OR = 5 /HPF Final    RBC Casts, UA 02/07/2024 NONE SEEN  < OR = 2 /HPF Final    Squam Epithel, UA 02/07/2024 NONE SEEN  < OR = 5 /HPF Final    Bacteria, UA 02/07/2024 MODERATE (A)  NONE SEEN /HPF Final    Hyaline Casts, UA 02/07/2024 0-5 (A)  NONE SEEN /LPF Final    Service Cmt: 02/07/2024    Final    Reflexive Urine Culture 02/07/2024    Final    Urine Culture,  Routine 02/07/2024  (A)   Final    TSH w/reflex to FT4 02/07/2024 1.74  0.40 - 4.50 mIU/L Final    Creatinine, Urine 02/07/2024 38  20 - 275 mg/dL Final    Microalb, Ur 02/07/2024 2.1  See Note: mg/dL Final    Microalb/Creat Ratio 02/07/2024 55 (H)  <30 mcg/mg creat Final    Cholesterol 02/07/2024 137  <200 mg/dL Final    HDL 02/07/2024 65  > OR = 50 mg/dL Final    Triglycerides 02/07/2024 65  <150 mg/dL Final    LDL Cholesterol 02/07/2024 58  mg/dL (calc) Final    HDL/Cholesterol Ratio 02/07/2024 2.1  <5.0 (calc) Final    Non HDL Chol. (LDL+VLDL) 02/07/2024 72  <130 mg/dL (calc) Final    Glucose 02/07/2024 105  65 - 139 mg/dL Final    BUN 02/07/2024 19  7 - 25 mg/dL Final    Creatinine 02/07/2024 1.18 (H)  0.60 - 0.95 mg/dL Final    eGFR 02/07/2024 44 (L)  > OR = 60 mL/min/1.73m2 Final    BUN/Creatinine Ratio 02/07/2024 16  6 - 22 (calc) Final    Sodium 02/07/2024 133 (L)  135 - 146 mmol/L Final    Potassium 02/07/2024 4.0  3.5 - 5.3 mmol/L Final    Chloride 02/07/2024 98  98 - 110 mmol/L Final    CO2 02/07/2024 26  20 - 32 mmol/L Final    Calcium 02/07/2024 8.6  8.6 - 10.4 mg/dL Final    Total Protein 02/07/2024 6.3  6.1 - 8.1 g/dL Final    Albumin 02/07/2024 3.9  3.6 - 5.1 g/dL Final    Globulin, Total 02/07/2024 2.4  1.9 - 3.7 g/dL (calc) Final    Albumin/Globulin Ratio 02/07/2024 1.6  1.0 - 2.5 (calc) Final    Total Bilirubin 02/07/2024 0.7  0.2 - 1.2 mg/dL Final    Alkaline Phosphatase 02/07/2024 60  37 - 153 U/L Final    AST 02/07/2024 13  10 - 35 U/L Final    ALT 02/07/2024 11  6 - 29 U/L Final    WBC 02/07/2024 5.5  3.8 - 10.8 Thousand/uL Final    RBC 02/07/2024 4.08  3.80 - 5.10 Million/uL Final    Hemoglobin 02/07/2024 12.2  11.7 - 15.5 g/dL Final    Hematocrit 02/07/2024 36.8  35.0 - 45.0 % Final    MCV 02/07/2024 90.2  80.0 - 100.0 fL Final    MCH 02/07/2024 29.9  27.0 - 33.0 pg Final    MCHC 02/07/2024 33.2  32.0 - 36.0 g/dL Final    RDW 02/07/2024 12.1  11.0 - 15.0 % Final    Platelets 02/07/2024 223   140 - 400 Thousand/uL Final    MPV 02/07/2024 9.9  7.5 - 12.5 fL Final    Neutrophils, Abs 02/07/2024 3,647  1,500 - 7,800 cells/uL Final    Lymph # 02/07/2024 1,238  850 - 3,900 cells/uL Final    Mono # 02/07/2024 501  200 - 950 cells/uL Final    Eos # 02/07/2024 88  15 - 500 cells/uL Final    Baso # 02/07/2024 28  0 - 200 cells/uL Final    Neutrophils Relative 02/07/2024 66.3  % Final    Lymph % 02/07/2024 22.5  % Final    Mono % 02/07/2024 9.1  % Final    Eosinophil % 02/07/2024 1.6  % Final    Basophil % 02/07/2024 0.5  % Final       Past Medical History:   Diagnosis Date    Back ache     Back pain     Carotid bruit     Cervical disc disease     Chest pain     Coronary artery disease     Diffuse cystic mastopathy     Elevated blood pressure     Encounter for blood transfusion     Foot pain, left     Generalized osteoarthrosis     Herpes zoster without complications     Hypertension     Neck pain     Neuropathy     Osteoporosis     Palpitations     PONV (postoperative nausea and vomiting)     TIA (transient ischemic attack)      Social History     Socioeconomic History    Marital status:     Number of children: 6   Tobacco Use    Smoking status: Never    Smokeless tobacco: Never   Substance and Sexual Activity    Alcohol use: Not Currently     Comment: socially if ever   Social History Narrative    Sleeps ok. Takes melatonin 5mg . About 7hrs    2 days per week     Walks 3-4 days per week     Past Surgical History:   Procedure Laterality Date    APPENDECTOMY      bladder and colon lift and retached      CARDIAC CATHETERIZATION      CATARACT EXTRACTION  02/23/2009    CHOLECYSTECTOMY      COLONOSCOPY Left 9/6/2021    Procedure: COLONOSCOPY;  Surgeon: Erlin Lainez MD;  Location: [facility];  Service: Endoscopy;  Laterality: Left;    ESOPHAGOGASTRODUODENOSCOPY Left 9/6/2021    Procedure: EGD (ESOPHAGOGASTRODUODENOSCOPY);  Surgeon: Erlin Lainez MD;  Location: [facility];  Service: Endoscopy;   Laterality: Left;    INSERTION OF PACEMAKER  12/2019    pt reported    TOTAL ABDOMINAL HYSTERECTOMY      TUBAL LIGATION       Family History   Problem Relation Age of Onset    Heart disease Father     Thyroid disease Father     Diabetes Father     Stroke Father     Diabetes Brother     Hypertension Brother     Hyperlipidemia Brother     Heart disease Brother     Cancer Brother         lung    Hypertension Sister     Diabetes Sister     Heart disease Sister     Hyperlipidemia Sister     Dementia Mother     Down syndrome Daughter     COPD Daughter        All of your core healthy metrics are met.      Review of patient's allergies indicates:   Allergen Reactions    Demerol [meperidine]      restless    Ciprofloxacin Nausea Only and Other (See Comments)     Severe dizziness        Current Outpatient Medications:     acetaminophen (TYLENOL) 500 MG tablet, Take 500 mg by mouth as needed for Pain. PRN, Disp: , Rfl:     amLODIPine (NORVASC) 2.5 MG tablet, Take 1 tablet (2.5 mg total) by mouth 2 (two) times daily., Disp: 180 tablet, Rfl: 3    amoxicillin-clavulanate 875-125mg (AUGMENTIN) 875-125 mg per tablet, Take 1 tablet by mouth 2 (two) times daily., Disp: 14 tablet, Rfl: 0    cholecalciferol, vitamin D3, (VITAMIN D3) 25 mcg (1,000 unit) capsule, Take 1,000 Units by mouth once daily., Disp: , Rfl:     ciclopirox (PENLAC) 8 % Soln, Apply topically nightly., Disp: 6.6 mL, Rfl: 2    cyanocobalamin 500 MCG tablet, Take 500 mcg by mouth once daily., Disp: , Rfl:     fluocinolone acetonide oiL 0.01 % Drop, 3-4 drops to the affected ear(s) twice daily no more than a week at a time as needed for itching and scaling, Disp: 20 mL, Rfl: 0    FOLIC ACID ORAL, Take by mouth., Disp: , Rfl:     gabapentin (NEURONTIN) 100 MG capsule, Take 100 mg by mouth every 6 (six) hours as needed., Disp: , Rfl:     hydrALAZINE (APRESOLINE) 10 MG tablet, Take 1 tablet (10 mg total) by mouth daily as needed (TAKE 1 TAB PO PRN EVERY 2 HOURS FOR SBP>180  "OR DBP>100)., Disp: 30 tablet, Rfl: 11    hydrocortisone 2.5 % cream, Apply topically 2 (two) times daily., Disp: 3.5 g, Rfl: 1    losartan (COZAAR) 50 MG tablet, Take 1 tablet (50 mg total) by mouth 2 (two) times a day., Disp: 180 tablet, Rfl: 3    melatonin 5 mg Cap, Take 1 capsule (5 mg total) by mouth every evening., Disp: 30 capsule, Rfl: 0    metroNIDAZOLE (FLAGYL) 500 MG tablet, Take 1 tablet (500 mg total) by mouth 3 (three) times daily., Disp: 21 tablet, Rfl: 0    polyethylene glycol (GLYCOLAX) 17 gram/dose powder, Take 17 g by mouth once daily., Disp: , Rfl:     Saccharomyces boulardii (FLORASTOR) 250 mg capsule, Take 1 capsule (250 mg total) by mouth 2 (two) times daily., Disp: 60 capsule, Rfl: 2    simethicone (MYLICON) 125 MG chewable tablet, Take 1 tablet (125 mg total) by mouth every 6 (six) hours as needed for Flatulence., Disp: 30 tablet, Rfl: 0    traZODone (DESYREL) 50 MG tablet, Take 1 tablet (50 mg total) by mouth every evening., Disp: 30 tablet, Rfl: 11    UNABLE TO FIND, Apply 3 Pump topically 5 (five) times daily. Ketoprofen/Cyclobenzaprine/Lidocaine Hcl (lipomax) 10/2/5%  APPLY UP TO 4.8 GRAMS (3 PUMPS) TO PAINFUL AREAS UP TO FIVE TIMES DAILY. RUB IN WELL, Disp: , Rfl:     vitamin E 400 UNIT capsule, Take 400 Units by mouth once daily., Disp: , Rfl:     Review of Systems   Constitutional:  Negative for chills and fever.   Respiratory:  Negative for cough and shortness of breath.    Cardiovascular:  Negative for chest pain and leg swelling.   Gastrointestinal:  Positive for abdominal pain and nausea.   Genitourinary:  Negative for flank pain, hematuria and urgency.   Musculoskeletal:  Negative for back pain.          Objective:      Vitals:    04/03/24 0823   BP: 132/62   Pulse: 83   SpO2: 98%   Weight: 58.5 kg (129 lb)   Height: 4' 11" (1.499 m)     Physical Exam  Vitals and nursing note reviewed.   Constitutional:       General: She is not in acute distress.     Appearance: Normal " appearance. She is well-developed.   HENT:      Head: Normocephalic.      Right Ear: External ear normal.      Left Ear: External ear normal.   Eyes:      Conjunctiva/sclera: Conjunctivae normal.      Pupils: Pupils are equal, round, and reactive to light.   Neck:      Vascular: No JVD.   Cardiovascular:      Rate and Rhythm: Normal rate and regular rhythm.      Heart sounds: Normal heart sounds. No murmur heard.  Pulmonary:      Effort: Pulmonary effort is normal.      Breath sounds: Normal breath sounds.   Abdominal:      General: Bowel sounds are normal.      Palpations: Abdomen is soft.      Tenderness: There is abdominal tenderness in the periumbilical area and left lower quadrant.   Musculoskeletal:         General: No deformity. Normal range of motion.      Cervical back: Normal range of motion and neck supple.   Lymphadenopathy:      Cervical: No cervical adenopathy.   Skin:     General: Skin is warm and dry.      Findings: No rash.   Neurological:      Mental Status: She is alert and oriented to person, place, and time.      Gait: Gait normal.   Psychiatric:         Speech: Speech normal.         Behavior: Behavior normal.           Assessment:       1. Gastroesophageal reflux disease with esophagitis without hemorrhage    2. Essential hypertension    3. Pacemaker    4. Diverticulitis    5. Flatulence/gas pain/belching         Plan:       Gastroesophageal reflux disease with esophagitis without hemorrhage  Comments:  gerd diet    Essential hypertension  Comments:  bp controlled    Pacemaker  Comments:  followed by cardio    Diverticulitis  Comments:  augmentin and flagyl. call if symptoms persist    Flatulence/gas pain/belching  Comments:  simethicone    Other orders  -     amoxicillin-clavulanate 875-125mg (AUGMENTIN) 875-125 mg per tablet; Take 1 tablet by mouth 2 (two) times daily.  Dispense: 14 tablet; Refill: 0  -     metroNIDAZOLE (FLAGYL) 500 MG tablet; Take 1 tablet (500 mg total) by mouth 3 (three)  times daily.  Dispense: 21 tablet; Refill: 0  -     simethicone (MYLICON) 125 MG chewable tablet; Take 1 tablet (125 mg total) by mouth every 6 (six) hours as needed for Flatulence.  Dispense: 30 tablet; Refill: 0      Follow up in about 3 months (around 7/3/2024), or if symptoms worsen or fail to improve.        4/3/2024 Denisse Beltrán

## 2024-04-10 ENCOUNTER — TELEPHONE (OUTPATIENT)
Dept: FAMILY MEDICINE | Facility: CLINIC | Age: 89
End: 2024-04-10

## 2024-04-10 NOTE — TELEPHONE ENCOUNTER
----- Message from Monae Perkins sent at 4/10/2024  8:05 AM CDT -----  Pt yodit like to reschedule appointment for today due to weather. However, needs a sooner appointment than may.   [phone]

## 2024-04-15 ENCOUNTER — OFFICE VISIT (OUTPATIENT)
Dept: FAMILY MEDICINE | Facility: CLINIC | Age: 89
End: 2024-04-15
Payer: MEDICARE

## 2024-04-15 VITALS
BODY MASS INDEX: 26.81 KG/M2 | HEIGHT: 59 IN | OXYGEN SATURATION: 96 % | SYSTOLIC BLOOD PRESSURE: 136 MMHG | DIASTOLIC BLOOD PRESSURE: 70 MMHG | WEIGHT: 133 LBS | HEART RATE: 76 BPM

## 2024-04-15 DIAGNOSIS — G47.00 INSOMNIA, UNSPECIFIED TYPE: ICD-10-CM

## 2024-04-15 DIAGNOSIS — K57.92 DIVERTICULITIS: ICD-10-CM

## 2024-04-15 DIAGNOSIS — R14.0 BLOATING: ICD-10-CM

## 2024-04-15 DIAGNOSIS — I10 ESSENTIAL HYPERTENSION: Primary | ICD-10-CM

## 2024-04-15 PROCEDURE — 99214 OFFICE O/P EST MOD 30 MIN: CPT | Mod: S$GLB,,, | Performed by: NURSE PRACTITIONER

## 2024-04-15 RX ORDER — SIMETHICONE 125 MG
125 TABLET,CHEWABLE ORAL EVERY 6 HOURS PRN
Qty: 30 TABLET | Refills: 0 | Status: SHIPPED | OUTPATIENT
Start: 2024-04-15

## 2024-04-16 NOTE — PROGRESS NOTES
SUBJECTIVE:    Patient ID: Vee Cadena is a 89 y.o. female.    Chief Complaint: Follow-up (No bottles//Pt here for 1 week follow up//Discuss pace maker. Pt slipped and hit her left chest, it wondering if that would cause pace maker to malfunction//discuss simethicone//JL)    89 year old female presents for urgent visit follow up.  Living alone. Treated for htn, pacemaker, cad, and insomnia.  followedby dr. Jett eldridge. Bp in office is well controlled.was seen in our office about 10 days ago. Diagnosed with diverticulitis. Took antibiotics as prescribed. Pain has almost completely resolved. Still has some gas. Appetite is normal.bm are normal.     Follow-up  Associated symptoms include abdominal pain and nausea. Pertinent negatives include no chest pain, chills, coughing or fever.   Abdominal Pain  Associated symptoms include nausea. Pertinent negatives include no fever or hematuria.       Telephone on 02/07/2024   Component Date Value Ref Range Status    Color, UA 02/07/2024 YELLOW  YELLOW Final    Appearance, UA 02/07/2024 CLOUDY (A)  CLEAR Final    Specific Gravity, UA 02/07/2024 1.007  1.001 - 1.035 Final    pH, UA 02/07/2024 7.0  5.0 - 8.0 Final    Glucose, UA 02/07/2024 NEGATIVE  NEGATIVE Final    Bilirubin, UA 02/07/2024 NEGATIVE  NEGATIVE Final    Ketones, UA 02/07/2024 NEGATIVE  NEGATIVE Final    Occult Blood UA 02/07/2024 TRACE (A)  NEGATIVE Final    Protein, UA 02/07/2024 NEGATIVE  NEGATIVE Final    Nitrite, UA 02/07/2024 NEGATIVE  NEGATIVE Final    Leukocytes, UA 02/07/2024 3+ (A)  NEGATIVE Final    WBC Casts, UA 02/07/2024 > OR = 60 (A)  < OR = 5 /HPF Final    RBC Casts, UA 02/07/2024 NONE SEEN  < OR = 2 /HPF Final    Squam Epithel, UA 02/07/2024 NONE SEEN  < OR = 5 /HPF Final    Bacteria, UA 02/07/2024 MODERATE (A)  NONE SEEN /HPF Final    Hyaline Casts, UA 02/07/2024 0-5 (A)  NONE SEEN /LPF Final    Service Cmt: 02/07/2024    Final    Reflexive Urine Culture 02/07/2024    Final    Urine Culture,  Routine 02/07/2024  (A)   Final    TSH w/reflex to FT4 02/07/2024 1.74  0.40 - 4.50 mIU/L Final    Creatinine, Urine 02/07/2024 38  20 - 275 mg/dL Final    Microalb, Ur 02/07/2024 2.1  See Note: mg/dL Final    Microalb/Creat Ratio 02/07/2024 55 (H)  <30 mcg/mg creat Final    Cholesterol 02/07/2024 137  <200 mg/dL Final    HDL 02/07/2024 65  > OR = 50 mg/dL Final    Triglycerides 02/07/2024 65  <150 mg/dL Final    LDL Cholesterol 02/07/2024 58  mg/dL (calc) Final    HDL/Cholesterol Ratio 02/07/2024 2.1  <5.0 (calc) Final    Non HDL Chol. (LDL+VLDL) 02/07/2024 72  <130 mg/dL (calc) Final    Glucose 02/07/2024 105  65 - 139 mg/dL Final    BUN 02/07/2024 19  7 - 25 mg/dL Final    Creatinine 02/07/2024 1.18 (H)  0.60 - 0.95 mg/dL Final    eGFR 02/07/2024 44 (L)  > OR = 60 mL/min/1.73m2 Final    BUN/Creatinine Ratio 02/07/2024 16  6 - 22 (calc) Final    Sodium 02/07/2024 133 (L)  135 - 146 mmol/L Final    Potassium 02/07/2024 4.0  3.5 - 5.3 mmol/L Final    Chloride 02/07/2024 98  98 - 110 mmol/L Final    CO2 02/07/2024 26  20 - 32 mmol/L Final    Calcium 02/07/2024 8.6  8.6 - 10.4 mg/dL Final    Total Protein 02/07/2024 6.3  6.1 - 8.1 g/dL Final    Albumin 02/07/2024 3.9  3.6 - 5.1 g/dL Final    Globulin, Total 02/07/2024 2.4  1.9 - 3.7 g/dL (calc) Final    Albumin/Globulin Ratio 02/07/2024 1.6  1.0 - 2.5 (calc) Final    Total Bilirubin 02/07/2024 0.7  0.2 - 1.2 mg/dL Final    Alkaline Phosphatase 02/07/2024 60  37 - 153 U/L Final    AST 02/07/2024 13  10 - 35 U/L Final    ALT 02/07/2024 11  6 - 29 U/L Final    WBC 02/07/2024 5.5  3.8 - 10.8 Thousand/uL Final    RBC 02/07/2024 4.08  3.80 - 5.10 Million/uL Final    Hemoglobin 02/07/2024 12.2  11.7 - 15.5 g/dL Final    Hematocrit 02/07/2024 36.8  35.0 - 45.0 % Final    MCV 02/07/2024 90.2  80.0 - 100.0 fL Final    MCH 02/07/2024 29.9  27.0 - 33.0 pg Final    MCHC 02/07/2024 33.2  32.0 - 36.0 g/dL Final    RDW 02/07/2024 12.1  11.0 - 15.0 % Final    Platelets 02/07/2024 223   140 - 400 Thousand/uL Final    MPV 02/07/2024 9.9  7.5 - 12.5 fL Final    Neutrophils, Abs 02/07/2024 3,647  1,500 - 7,800 cells/uL Final    Lymph # 02/07/2024 1,238  850 - 3,900 cells/uL Final    Mono # 02/07/2024 501  200 - 950 cells/uL Final    Eos # 02/07/2024 88  15 - 500 cells/uL Final    Baso # 02/07/2024 28  0 - 200 cells/uL Final    Neutrophils Relative 02/07/2024 66.3  % Final    Lymph % 02/07/2024 22.5  % Final    Mono % 02/07/2024 9.1  % Final    Eosinophil % 02/07/2024 1.6  % Final    Basophil % 02/07/2024 0.5  % Final       Past Medical History:   Diagnosis Date    Back ache     Back pain     Carotid bruit     Cervical disc disease     Chest pain     Coronary artery disease     Diffuse cystic mastopathy     Elevated blood pressure     Encounter for blood transfusion     Foot pain, left     Generalized osteoarthrosis     Herpes zoster without complications     Hypertension     Neck pain     Neuropathy     Osteoporosis     Palpitations     PONV (postoperative nausea and vomiting)     TIA (transient ischemic attack)      Social History     Socioeconomic History    Marital status:     Number of children: 6   Tobacco Use    Smoking status: Never    Smokeless tobacco: Never   Substance and Sexual Activity    Alcohol use: Not Currently     Comment: socially if ever   Social History Narrative    Sleeps ok. Takes melatonin 5mg . About 7hrs    2 days per week     Walks 3-4 days per week     Past Surgical History:   Procedure Laterality Date    APPENDECTOMY      bladder and colon lift and retached      CARDIAC CATHETERIZATION      CATARACT EXTRACTION  02/23/2009    CHOLECYSTECTOMY      COLONOSCOPY Left 9/6/2021    Procedure: COLONOSCOPY;  Surgeon: Erlin Lainez MD;  Location: [facility];  Service: Endoscopy;  Laterality: Left;    ESOPHAGOGASTRODUODENOSCOPY Left 9/6/2021    Procedure: EGD (ESOPHAGOGASTRODUODENOSCOPY);  Surgeon: Erlin Lainez MD;  Location: [facility];  Service: Endoscopy;   Laterality: Left;    INSERTION OF PACEMAKER  12/2019    pt reported    TOTAL ABDOMINAL HYSTERECTOMY      TUBAL LIGATION       Family History   Problem Relation Name Age of Onset    Heart disease Father      Thyroid disease Father      Diabetes Father      Stroke Father      Diabetes Brother      Hypertension Brother      Hyperlipidemia Brother      Heart disease Brother      Cancer Brother          lung    Hypertension Sister      Diabetes Sister      Heart disease Sister      Hyperlipidemia Sister      Dementia Mother      Down syndrome Daughter      COPD Daughter         All of your core healthy metrics are met.      Review of patient's allergies indicates:   Allergen Reactions    Demerol [meperidine]      restless    Ciprofloxacin Nausea Only and Other (See Comments)     Severe dizziness        Current Outpatient Medications:     acetaminophen (TYLENOL) 500 MG tablet, Take 500 mg by mouth as needed for Pain. PRN, Disp: , Rfl:     amLODIPine (NORVASC) 2.5 MG tablet, Take 1 tablet (2.5 mg total) by mouth 2 (two) times daily., Disp: 180 tablet, Rfl: 3    amoxicillin-clavulanate 875-125mg (AUGMENTIN) 875-125 mg per tablet, Take 1 tablet by mouth 2 (two) times daily., Disp: 14 tablet, Rfl: 0    cholecalciferol, vitamin D3, (VITAMIN D3) 25 mcg (1,000 unit) capsule, Take 1,000 Units by mouth once daily., Disp: , Rfl:     ciclopirox (PENLAC) 8 % Soln, Apply topically nightly., Disp: 6.6 mL, Rfl: 2    cyanocobalamin 500 MCG tablet, Take 500 mcg by mouth once daily., Disp: , Rfl:     fluocinolone acetonide oiL 0.01 % Drop, 3-4 drops to the affected ear(s) twice daily no more than a week at a time as needed for itching and scaling, Disp: 20 mL, Rfl: 0    FOLIC ACID ORAL, Take by mouth., Disp: , Rfl:     gabapentin (NEURONTIN) 100 MG capsule, Take 100 mg by mouth every 6 (six) hours as needed., Disp: , Rfl:     hydrALAZINE (APRESOLINE) 10 MG tablet, Take 1 tablet (10 mg total) by mouth daily as needed (TAKE 1 TAB PO PRN  "EVERY 2 HOURS FOR SBP>180 OR DBP>100)., Disp: 30 tablet, Rfl: 11    hydrocortisone 2.5 % cream, Apply topically 2 (two) times daily., Disp: 3.5 g, Rfl: 1    losartan (COZAAR) 50 MG tablet, Take 1 tablet (50 mg total) by mouth 2 (two) times a day., Disp: 180 tablet, Rfl: 3    melatonin 5 mg Cap, Take 1 capsule (5 mg total) by mouth every evening., Disp: 30 capsule, Rfl: 0    metroNIDAZOLE (FLAGYL) 500 MG tablet, Take 1 tablet (500 mg total) by mouth 3 (three) times daily., Disp: 21 tablet, Rfl: 0    polyethylene glycol (GLYCOLAX) 17 gram/dose powder, Take 17 g by mouth once daily., Disp: , Rfl:     Saccharomyces boulardii (FLORASTOR) 250 mg capsule, Take 1 capsule (250 mg total) by mouth 2 (two) times daily., Disp: 60 capsule, Rfl: 2    simethicone (MYLICON) 125 MG chewable tablet, Take 1 tablet (125 mg total) by mouth every 6 (six) hours as needed for Flatulence., Disp: 30 tablet, Rfl: 0    traZODone (DESYREL) 50 MG tablet, Take 1 tablet (50 mg total) by mouth every evening., Disp: 30 tablet, Rfl: 11    UNABLE TO FIND, Apply 3 Pump topically 5 (five) times daily. Ketoprofen/Cyclobenzaprine/Lidocaine Hcl (lipomax) 10/2/5%  APPLY UP TO 4.8 GRAMS (3 PUMPS) TO PAINFUL AREAS UP TO FIVE TIMES DAILY. RUB IN WELL, Disp: , Rfl:     vitamin E 400 UNIT capsule, Take 400 Units by mouth once daily., Disp: , Rfl:     Review of Systems   Constitutional:  Negative for chills and fever.   Respiratory:  Negative for cough and shortness of breath.    Cardiovascular:  Negative for chest pain and leg swelling.   Gastrointestinal:  Positive for abdominal pain and nausea.   Genitourinary:  Negative for flank pain, hematuria and urgency.   Musculoskeletal:  Negative for back pain.          Objective:      Vitals:    04/15/24 1251   BP: 136/70   Pulse: 76   SpO2: 96%   Weight: 60.3 kg (133 lb)   Height: 4' 11" (1.499 m)     Physical Exam  Vitals and nursing note reviewed.   Constitutional:       General: She is not in acute distress.     " Appearance: Normal appearance. She is well-developed. She is not ill-appearing.   Eyes:      Conjunctiva/sclera: Conjunctivae normal.      Pupils: Pupils are equal, round, and reactive to light.   Neck:      Vascular: No JVD.   Cardiovascular:      Rate and Rhythm: Normal rate and regular rhythm.      Heart sounds: Normal heart sounds. No murmur heard.  Pulmonary:      Effort: Pulmonary effort is normal.      Breath sounds: Normal breath sounds.   Abdominal:      General: Bowel sounds are normal.      Palpations: Abdomen is soft.      Tenderness: There is abdominal tenderness in the periumbilical area and left lower quadrant.      Comments: Minimal llq. Significantly less tender since last visit   Musculoskeletal:         General: No deformity. Normal range of motion.      Cervical back: Normal range of motion and neck supple.   Lymphadenopathy:      Cervical: No cervical adenopathy.   Skin:     General: Skin is warm and dry.      Findings: No rash.   Neurological:      Mental Status: She is alert and oriented to person, place, and time.      Gait: Gait normal.   Psychiatric:         Speech: Speech normal.         Behavior: Behavior normal.           Assessment:       1. Essential hypertension    2. Diverticulitis    3. Bloating    4. Insomnia, unspecified type         Plan:       Essential hypertension  Comments:  bp is controlled    Diverticulitis  Comments:  continue bland diet. 85% improved    Bloating  Comments:  mylicon    Insomnia, unspecified type  Comments:  trazodone    Other orders  -     simethicone (MYLICON) 125 MG chewable tablet; Take 1 tablet (125 mg total) by mouth every 6 (six) hours as needed for Flatulence.  Dispense: 30 tablet; Refill: 0      Follow up in about 3 months (around 7/15/2024), or if symptoms worsen or fail to improve, for medication management.        4/16/2024 Denisse Beltrán

## 2024-04-19 ENCOUNTER — CLINICAL SUPPORT (OUTPATIENT)
Dept: REHABILITATION | Facility: HOSPITAL | Age: 89
End: 2024-04-19
Payer: MEDICARE

## 2024-04-19 ENCOUNTER — TELEPHONE (OUTPATIENT)
Dept: CARDIOLOGY | Facility: CLINIC | Age: 89
End: 2024-04-19
Payer: MEDICARE

## 2024-04-19 DIAGNOSIS — R29.898 DECREASED STRENGTH OF LOWER EXTREMITY: Primary | ICD-10-CM

## 2024-04-19 DIAGNOSIS — M53.86 DECREASED RANGE OF MOTION OF LUMBAR SPINE: ICD-10-CM

## 2024-04-19 DIAGNOSIS — R29.3 POOR POSTURE: ICD-10-CM

## 2024-04-19 DIAGNOSIS — Z74.09 IMPAIRED FUNCTIONAL MOBILITY, BALANCE, GAIT, AND ENDURANCE: ICD-10-CM

## 2024-04-19 PROCEDURE — 97530 THERAPEUTIC ACTIVITIES: CPT | Mod: PN

## 2024-04-19 PROCEDURE — 97161 PT EVAL LOW COMPLEX 20 MIN: CPT | Mod: PN

## 2024-04-19 NOTE — TELEPHONE ENCOUNTER
----- Message from Danica Verduzco sent at 4/19/2024  9:45 AM CDT -----  Regarding: Pt called to speak to the nurse regarding her missed appt on yesterday and pt would like a call back today  Patient Advice:    Pt called to speak to the nurse regarding her missed appt on yesterday and pt would like a call back today    Pt can be reached at [phone]

## 2024-04-19 NOTE — PLAN OF CARE
[facility]   Physical Therapy Initial Evaluation      Name: Vee Cadena  Clinic Number: [id number]    Therapy Diagnosis:   Encounter Diagnoses   Name Primary?    Decreased strength of lower extremity Yes    Poor posture     Decreased range of motion of lumbar spine     Impaired functional mobility, balance, gait, and endurance         Physician: Denisse Beltrán, NP    Physician Orders: PT Eval and Treat  Medical Diagnosis from Referral: M54.42,M54.41 (ICD-10-CM) - Low back pain with bilateral sciatica, unspecified back pain laterality, unspecified chronicity   Evaluation Date: 4/19/2024  Authorization Period Expiration: 12/31/2024  Plan of Care Expiration: 8/19/2024  Progress Note Due: 5/19/2024  Date of Surgery: none   Visit # / Visits authorized: 1/ 20   FOTO: 1/ 3    Precautions: Standard and pacemaker, CAD, Heart block, HTN      Time In: 1103  Time Out: 1200   Total Billable Time: 57 minutes    Subjective     Date of onset: moved from [place] 3 years ago and one of the drawers started to fall out and reports she lunged forward to catch it and felt the pain since then on and off     History of current condition - Vee reports: she reports bilateral low back pain. When she is sitting, she is okay. When she is in the kitchen cooking is when she notices the pain mostly. No pain getting up from the chair. Most of her pain occurs with standing. No numbness and tingling in the legs. Has neuropathy in her feet mostly the R. Ambulates with SPC started with this about 1.5 years ago consistently. She has poor balance. Patent drives. Tuesday and Thursday she does her own exercises routine. There is an exercise she lifts her head and feet at the same time but that bothers her back. Pain increases with prolong sitting as well. Significant improvement when she pays attention to her posture.     Falls: 0, last fall 2 years ago    Imaging: none recently does have history of multiple compression  fractures in thoracic region     2020:    FINDINGS:  There are multiple compression fractures in the mid and lower thoracic spine.  The exact levels is undetermined.  These are most likely chronic..  Cannot rule out acute disease with certainty.  Several or severe compressions approximately 60-70%.     Prior Therapy: yes another location   Social History: live in apartment, lives alone  Occupation: retired  Hobbies: reading, taking walks   Prior Level of Function: independent   Current Level of Function: independent, difficulty with standing for prolong periods - cooking, cleaning/household chores - has to take breaks     Pain:  Current 0/10, worst 5/10, best 0/10   Location: midline back  Description: Aching  Aggravating Factors: standing and if she doesn't straighten up the pain is worse and has to sit to ease the pain   Easing Factors:  heating pad helps     Patients goals: improve her posture      Medical History:   Past Medical History:   Diagnosis Date    Back ache     Back pain     Carotid bruit     Cervical disc disease     Chest pain     Coronary artery disease     Diffuse cystic mastopathy     Elevated blood pressure     Encounter for blood transfusion     Foot pain, left     Generalized osteoarthrosis     Herpes zoster without complications     Hypertension     Neck pain     Neuropathy     Osteoporosis     Palpitations     PONV (postoperative nausea and vomiting)     TIA (transient ischemic attack)        Surgical History:   Vee Cadena  has a past surgical history that includes Cardiac catheterization; Total abdominal hysterectomy; Tubal ligation; Cholecystectomy; bladder and colon lift and retached; Appendectomy; Cataract extraction (02/23/2009); Insertion of pacemaker (12/2019); Esophagogastroduodenoscopy (Left, 9/6/2021); and Colonoscopy (Left, 9/6/2021).    Medications:   Vee has a current medication list which includes the following prescription(s): acetaminophen, amlodipine,  amoxicillin-clavulanate 875-125mg, cholecalciferol (vitamin d3), ciclopirox, cyanocobalamin, fluocinolone acetonide oil, folic acid, gabapentin, hydralazine, hydrocortisone, losartan, melatonin, metronidazole, polyethylene glycol, saccharomyces boulardii, simethicone, trazodone, UNABLE TO FIND, and vitamin e.    Allergies:   Review of patient's allergies indicates:   Allergen Reactions    Demerol [meperidine]      restless    Ciprofloxacin Nausea Only and Other (See Comments)     Severe dizziness         Objective      Structural/Postural Inspection: severe thoracic kyphosis, convexity on the L side of spine, history of multiple compression fractures of thoracic spine, forward head, rounded shoulders, decreased lordosis    B LE pitting edema below knees at ankles      STANDING: posterior lean reliance of Y ligaments    Ambulation: SPC with Pranav with decreased heel strike, step length, posterior trunk lean, decreased speed     Active Range of Motion (AROM)/Resisted Movements: no pain with active lumbar range of motion     Lumbar/Thoracic AROM (Degrees)   Flexion Moderate limitations    Extension Moderate limitations    Right side glide  No limitation    Left side glide  No limitation    Right Rotation Moderate limitation    Left Rotation Moderate limitation      Repeated Movements:     Repeated Movements/Direction Specific Description  (Before, during, and after)   Postural Correction  Better    Standing Extension (EIS) No pain, feels fine    Standing Flexion (FIS) NT, history of compressions fractures    Supine Flexion (ADAM) NT   Prone Extension (EIL) NT     Static positions  Description  (Before, during, and after)   Prone lie  No pain    Prone on elbows  No pain    Sitting with lumbar roll Better    Sitting slouched  Worse      LE MMT Testing:     RLE Strength Grade LLE Strength Grade   Hip Flexion (L2) 4/5 Hip Flexion (L2) 4/5   Hip Extension  3-/5 Hip Extension 3-/5   Hip Abduction 3-/5 Hip Abduction 3-/5   Hip  Adduction 3+/5 Hip Adduction 3+/5   Hip Internal Rotation 3+/5 Hip Internal Rotation 3+/5   Hip External Rotation 3+/5 Hip External Rotation 3+/5   Knee Flexion  NT Knee Flexion NT   Knee Extension (L3) 5/5 Knee Extension (L3) 5/5   Ankle Dorsiflexion (L4) 4+/5 Ankle Dorsiflexion (L4) 4+/5     Muscle Length Tension Testing:     Lumbar/Hip/Knee Right  Left  Comments   Hamstring Length (90/90) normal normal    Ely's Test NT NT Noted to have tightness of hip flexors reporting stretching with standing extension of lumbar spine      Hip Screen:      ROM: WFL, limited hip extension likely due to tightness of hip flexors     Sensation: NT neg for radicular symptoms     Neural Tension Testing Right  Left Comments   Seated Slump NT NT    SLR (Sciatic) Negative. Negative.      Outcome Measures:     5x STS: use of Ues 18 second     TU second with SPC     Intake Outcome Measure for FOTO 2024 Survey    Therapist reviewed FOTO scores for Vee Cadena on 2024.   FOTO report - see Media section or FOTO account episode details.    Intake Score: 60%       Treatment     Total Treatment time (time-based codes) separate from Evaluation: 10 minutes     Vee received the treatments listed below:      therapeutic activities to improve functional performance for 10   minutes, including:    Educated on posture, education on compression fractures, education repeated movements, education on likely culprit of pain throughout the day with sitting and prolong standing     Patient Education and Home Exercises     Education provided:   - HEP  - therapy goals and POC     Written Home Exercises Provided: yes. Exercises were reviewed and Vee was able to demonstrate them prior to the end of the session.  Vee demonstrated good  understanding of the education provided. See EMR under Patient Instructions for exercises provided during therapy sessions.    Assessment     Vee is a 89 y.o. female referred to outpatient Physical Therapy  with a medical diagnosis of M54.42,M54.41 (ICD-10-CM) - Low back pain with bilateral sciatica, unspecified back pain laterality, unspecified chronicity. Patient presents with c/o B chronic low back pain that started a few years ago with on and off symptoms. Most of her pain occurs with prolong standing in which she contributes to poor posture. She reports improvement when she corrects her posture. She has history of thoracic compressions fractures with significant kyphosis contributing to poor standing and seated posture. She also demonstrates decreased lumbar AROM. She tolerated prone lie and prone on elbows as well as repeated standing extensions well without complaints of pain. She also demonstrates impaired balanc and strength impacting her functional mobility. She will benefit from postural education and repeated extensions to improve standing and seated posture. She will benefit from skilled Physical Therapist services to address above deficits which will lead to improved functional mobility and quality of life.      Patient prognosis is Good.   Patient will benefit from skilled outpatient Physical Therapy to address the deficits stated above and in the chart below, provide patient /family education, and to maximize patientt's level of independence.     Plan of care discussed with patient: Yes  Patient's spiritual, cultural and educational needs considered and patient is agreeable to the plan of care and goals as stated below:     Anticipated Barriers for therapy: age, history of compression fractures     Medical Necessity is demonstrated by the following  History  Co-morbidities and personal factors that may impact the plan of care [] LOW: no personal factors / co-morbidities  [] MODERATE: 1-2 personal factors / co-morbidities  [x] HIGH: 3+ personal factors / co-morbidities    Moderate / High Support Documentation:   Co-morbidities affecting plan of care: chronic kidney disease, pacemaker, age, compression  fractures, HTN, heart block     Personal Factors:   age     Examination  Body Structures and Functions, activity limitations and participation restrictions that may impact the plan of care [] LOW: addressing 1-2 elements  [] MODERATE: 3+ elements  [x] HIGH: 4+ elements (please support below)    Moderate / High Support Documentation: posture, gait, range of motion, strength     Clinical Presentation [x] LOW: stable  [] MODERATE: Evolving  [] HIGH: Unstable     Decision Making/ Complexity Score: low       Goals:    STG  Weeks/Visits Date Established  Goal Status    1.Patient will report >/= 30 % improvement in pain and function to improve standing tolerance  3 weeks/  6 visits  4/19/2024      2. Patient will report improved standing tolerance to improve activity tolerance  3 weeks/  6 visits  4/19/2024      3.Patient will report </= 3/10 pain for at worst pain over the past few days to improved functional mobility  3 weeks/  6 visits  4/19/2024      4.Patient will report compliance with lumbar roll to improve sitting posture  3 weeks/  6 visits  4/19/2024      5. Pt will demonstrate and verbalize compliance and independence with HEP to improve therapy outcomes  3 weeks/  6 visits  4/19/2024        LTG Weeks/Visits Date Established  Goal Status    1.Patient will report >/= 50 % improvement in pain and function to improve standing tolerance  6 weeks/ 12 visits  4/19/2024      2. Patient will report </= 2/10 pain for at worst pain over the past few days to improved functional mobility  6 weeks/ 12 visits  4/19/2024        3.Patient will perform 5xSTS without UE support in </= 16 second to improve transfers  6 weeks/ 12 visits  4/19/2024      4.Patient will perform TUG in </= 14 second to decrease risk for falls  6 weeks/ 12 visits  4/19/2024      5.Patient will report >/= 64% on FOTO to improve quality of life  6 weeks/ 12 visits  4/19/2024          Plan     Plan of care Certification: 4/19/2024 to  8/19/2024.    Outpatient Physical Therapy 2 times weekly for 6 weeks to include the following interventions: Gait Training, Manual Therapy, Moist Heat/ Ice, Neuromuscular Re-ed, Patient Education, Therapeutic Activities, and Therapeutic Exercise.     Марина yRan PT        Physician's Signature: _________________________________________ Date: ________________

## 2024-04-24 ENCOUNTER — CLINICAL SUPPORT (OUTPATIENT)
Dept: REHABILITATION | Facility: HOSPITAL | Age: 89
End: 2024-04-24
Payer: MEDICARE

## 2024-04-24 DIAGNOSIS — R29.898 DECREASED STRENGTH OF LOWER EXTREMITY: Primary | ICD-10-CM

## 2024-04-24 DIAGNOSIS — M53.86 DECREASED RANGE OF MOTION OF LUMBAR SPINE: ICD-10-CM

## 2024-04-24 DIAGNOSIS — Z74.09 IMPAIRED FUNCTIONAL MOBILITY, BALANCE, GAIT, AND ENDURANCE: ICD-10-CM

## 2024-04-24 DIAGNOSIS — R29.3 POOR POSTURE: ICD-10-CM

## 2024-04-24 PROCEDURE — 97112 NEUROMUSCULAR REEDUCATION: CPT | Mod: PN,CQ

## 2024-04-24 PROCEDURE — 97530 THERAPEUTIC ACTIVITIES: CPT | Mod: PN,CQ

## 2024-04-24 NOTE — PROGRESS NOTES
[facility]   Physical Therapy Treatment Note      Name: Vee Cadena  Clinic Number: [id number]    Therapy Diagnosis:   Encounter Diagnoses   Name Primary?    Decreased strength of lower extremity Yes    Poor posture     Decreased range of motion of lumbar spine     Impaired functional mobility, balance, gait, and endurance      Physician: Denisse Beltrán NP    Visit Date: 4/24/2024    Physician Orders: PT Eval and Treat  Medical Diagnosis from Referral: M54.42,M54.41 (ICD-10-CM) - Low back pain with bilateral sciatica, unspecified back pain laterality, unspecified chronicity   Evaluation Date: 4/19/2024  Authorization Period Expiration: 12/31/2024  Plan of Care Expiration: 8/19/2024  Progress Note Due: 5/19/2024  Date of Surgery: none   Visit # / Visits authorized: 2/ 20   FOTO: 1/ 3     Precautions: Standard and pacemaker, CAD, Heart block, HTN       Time In: 1100  Time Out: 1155  Total Billable Time: 55 minutes      PTA Visit #: 1/5       Subjective     Patient reports: she is doing good this morning and not having any pain.    She was compliant with home exercise program.  Response to previous treatment: no complaints  Functional change: first visit after eval    Pain: 0/10  Location:     Objective      Objective Measures updated at progress report unless specified.     Treatment     Vee received the treatments listed below:      therapeutic exercises to develop strength, endurance, ROM, flexibility, posture, and core stabilization for  minutes including:      neuromuscular re-education activities to improve: Balance, Coordination, Proprioception, and Posture for 45 minutes. The following activities were included:    Prone on elbows (not performed)  Prone press ups (not performed)    Seated Open books x 10 reps each    Seated thoracic extension 2 x 10  Seated Horizontal Abduction 2 x 10 reps red theraband   Seated Diagonals x 10 reps red theraband   Seated External rotation red  theraband x 10 reps     therapeutic activities to improve functional performance for 10 minutes, including:    Standing lumbar extension against mat   Standing hip extension 2 x 10 reps (leaning over wedge)  Standing hip Abduction 2 x 10 reps       Patient Education and Home Exercises       Education provided:   -apply ice as needed for delayed muscle soreness  -Continue with Home exercise program daily    Written Home Exercises Provided: Patient instructed to cont prior HEP. Additional exercises given to patient for postural exercises and standing strength and stability.  Exercises were reviewed and Vee was able to demonstrate them prior to the end of the session.  Vee demonstrated good  understanding of the education provided. See Electronic Medical Record under Patient Instructions for exercises provided during therapy sessions    Assessment     Vee presents to PT with no pain in her back at present time. She was given an updated exercises program to adduction to her Home exercise program.  Exercises given to help with posture and lower extremity strength and stability.  Pt was also given red theraband for the exercises.      Vee Is progressing well towards her goals.   Patient prognosis is Good.     Patient will continue to benefit from skilled outpatient physical therapy to address the deficits listed in the problem list box on initial evaluation, provide pt/family education and to maximize pt's level of independence in the home and community environment.     Patient's spiritual, cultural and educational needs considered and pt agreeable to plan of care and goals.     Anticipated barriers to physical therapy: age, history of compression fractures     Goals:  STG  Weeks/Visits Date Established  Goal Status    1.Patient will report >/= 30 % improvement in pain and function to improve standing tolerance  3 weeks/  6 visits  4/19/2024        2. Patient will report improved standing tolerance to improve  activity tolerance  3 weeks/  6 visits  4/19/2024        3.Patient will report </= 3/10 pain for at worst pain over the past few days to improved functional mobility  3 weeks/  6 visits  4/19/2024        4.Patient will report compliance with lumbar roll to improve sitting posture  3 weeks/  6 visits  4/19/2024        5. Pt will demonstrate and verbalize compliance and independence with HEP to improve therapy outcomes  3 weeks/  6 visits  4/19/2024           LTG Weeks/Visits Date Established  Goal Status    1.Patient will report >/= 50 % improvement in pain and function to improve standing tolerance  6 weeks/ 12 visits  4/19/2024        2. Patient will report </= 2/10 pain for at worst pain over the past few days to improved functional mobility  6 weeks/ 12 visits  4/19/2024           3.Patient will perform 5xSTS without UE support in </= 16 second to improve transfers  6 weeks/ 12 visits  4/19/2024        4.Patient will perform TUG in </= 14 second to decrease risk for falls  6 weeks/ 12 visits  4/19/2024        5.Patient will report >/= 64% on FOTO to improve quality of life  6 weeks/ 12 visits  4/19/2024              Plan      Plan of care Certification: 4/19/2024 to 8/19/2024.     Outpatient Physical Therapy 2 times weekly for 6 weeks to include the following interventions: Gait Training, Manual Therapy, Moist Heat/ Ice, Neuromuscular Re-ed, Patient Education, Therapeutic Activities, and Therapeutic Exercise.             Nikia Johnson, PTA

## 2024-04-26 ENCOUNTER — CLINICAL SUPPORT (OUTPATIENT)
Dept: REHABILITATION | Facility: HOSPITAL | Age: 89
End: 2024-04-26
Payer: MEDICARE

## 2024-04-26 DIAGNOSIS — M53.86 DECREASED RANGE OF MOTION OF LUMBAR SPINE: ICD-10-CM

## 2024-04-26 DIAGNOSIS — Z74.09 IMPAIRED FUNCTIONAL MOBILITY, BALANCE, GAIT, AND ENDURANCE: ICD-10-CM

## 2024-04-26 DIAGNOSIS — R29.3 POOR POSTURE: ICD-10-CM

## 2024-04-26 DIAGNOSIS — R29.898 DECREASED STRENGTH OF LOWER EXTREMITY: Primary | ICD-10-CM

## 2024-04-26 PROCEDURE — 97112 NEUROMUSCULAR REEDUCATION: CPT | Mod: PN,CQ

## 2024-04-26 PROCEDURE — 97530 THERAPEUTIC ACTIVITIES: CPT | Mod: PN,CQ

## 2024-04-26 NOTE — PROGRESS NOTES
[facility]   Physical Therapy Treatment Note      Name: Vee Cadena  Clinic Number: [id number]    Therapy Diagnosis:   Encounter Diagnoses   Name Primary?    Decreased strength of lower extremity Yes    Poor posture     Decreased range of motion of lumbar spine     Impaired functional mobility, balance, gait, and endurance      Physician: Denisse Beltrán NP    Visit Date: 4/26/2024    Physician Orders: PT Eval and Treat  Medical Diagnosis from Referral: M54.42,M54.41 (ICD-10-CM) - Low back pain with bilateral sciatica, unspecified back pain laterality, unspecified chronicity   Evaluation Date: 4/19/2024  Authorization Period Expiration: 12/31/2024  Plan of Care Expiration: 8/19/2024  Progress Note Due: 5/19/2024  Date of Surgery: none   Visit # / Visits authorized: 2/ 20   FOTO: 1/ 3     Precautions: Standard and pacemaker, CAD, Heart block, HTN       Time In: 1100  Time Out: 1150  Total Billable Time: 50 minutes      PTA Visit #: 1/5       Subjective     Patient reports: she is doing good this morning and not having any pain.  Pt asking about exercises to address her core.  She was compliant with home exercise program.  Response to previous treatment: no complaints  Functional change: first visit after eval    Pain: 0/10  Location:     Objective      Objective Measures updated at progress report unless specified.     Treatment     Vee received the treatments listed below:      therapeutic exercises to develop strength, endurance, ROM, flexibility, posture, and core stabilization for  minutes including:    Bike x 5 minutes, level 2    neuromuscular re-education activities to improve: Balance, Coordination, Proprioception, and Posture for 25 minutes. The following activities were included:    Prone on elbows (not performed)  Prone press ups (not performed)    Seated Open books x 10 reps each    Seated thoracic extension 2 x 10 reps   Seated Horizontal Abduction 2 x 10 reps red theraband    Seated Diagonals 2 x 10 reps red theraband   Seated External rotation red theraband 2 x 10 reps     therapeutic activities to improve functional performance for 15 minutes, including:    Standing lumbar extension against mat x 10 reps   Transverse abdominal sets x 10 reps forward and lateral (orange physioball)  Standing shoulder flexion holding small ball 2 x 10 reps (to activate core muscles)    Standing hip extension 2 x 10 reps (leaning over wedge)  Standing hip Abduction 2 x 10 reps       Patient Education and Home Exercises       Education provided:   -apply ice as needed for delayed muscle soreness  -Continue with Home exercise program daily    Written Home Exercises Provided: Patient instructed to cont prior HEP. Additional exercises given to patient for postural exercises and standing strength and stability.  Exercises were reviewed and Vee was able to demonstrate them prior to the end of the session.  Vee demonstrated good  understanding of the education provided. See Electronic Medical Record under Patient Instructions for exercises provided during therapy sessions    Assessment     Vee presents to PT with no pain in her back at present time. Added 2 exercises to her exercises program to address her core strength.  She did well.  Copy of exercises given to patient.      Vee Is progressing well towards her goals.   Patient prognosis is Good.     Patient will continue to benefit from skilled outpatient physical therapy to address the deficits listed in the problem list box on initial evaluation, provide pt/family education and to maximize pt's level of independence in the home and community environment.     Patient's spiritual, cultural and educational needs considered and pt agreeable to plan of care and goals.     Anticipated barriers to physical therapy: age, history of compression fractures     Goals:  STG  Weeks/Visits Date Established  Goal Status    1.Patient will report >/= 30 %  improvement in pain and function to improve standing tolerance  3 weeks/  6 visits  4/19/2024        2. Patient will report improved standing tolerance to improve activity tolerance  3 weeks/  6 visits  4/19/2024        3.Patient will report </= 3/10 pain for at worst pain over the past few days to improved functional mobility  3 weeks/  6 visits  4/19/2024        4.Patient will report compliance with lumbar roll to improve sitting posture  3 weeks/  6 visits  4/19/2024        5. Pt will demonstrate and verbalize compliance and independence with HEP to improve therapy outcomes  3 weeks/  6 visits  4/19/2024           LTG Weeks/Visits Date Established  Goal Status    1.Patient will report >/= 50 % improvement in pain and function to improve standing tolerance  6 weeks/ 12 visits  4/19/2024        2. Patient will report </= 2/10 pain for at worst pain over the past few days to improved functional mobility  6 weeks/ 12 visits  4/19/2024           3.Patient will perform 5xSTS without UE support in </= 16 second to improve transfers  6 weeks/ 12 visits  4/19/2024        4.Patient will perform TUG in </= 14 second to decrease risk for falls  6 weeks/ 12 visits  4/19/2024        5.Patient will report >/= 64% on FOTO to improve quality of life  6 weeks/ 12 visits  4/19/2024              Plan      Plan of care Certification: 4/19/2024 to 8/19/2024.     Outpatient Physical Therapy 2 times weekly for 6 weeks to include the following interventions: Gait Training, Manual Therapy, Moist Heat/ Ice, Neuromuscular Re-ed, Patient Education, Therapeutic Activities, and Therapeutic Exercise.       Nikia Johnson, PTA

## 2024-05-01 ENCOUNTER — CLINICAL SUPPORT (OUTPATIENT)
Dept: REHABILITATION | Facility: HOSPITAL | Age: 89
End: 2024-05-01
Payer: MEDICARE

## 2024-05-01 DIAGNOSIS — Z74.09 IMPAIRED FUNCTIONAL MOBILITY, BALANCE, GAIT, AND ENDURANCE: ICD-10-CM

## 2024-05-01 DIAGNOSIS — R29.898 DECREASED STRENGTH OF LOWER EXTREMITY: Primary | ICD-10-CM

## 2024-05-01 DIAGNOSIS — R29.3 POOR POSTURE: ICD-10-CM

## 2024-05-01 DIAGNOSIS — M53.86 DECREASED RANGE OF MOTION OF LUMBAR SPINE: ICD-10-CM

## 2024-05-01 PROCEDURE — 97112 NEUROMUSCULAR REEDUCATION: CPT | Mod: PN,CQ

## 2024-05-01 PROCEDURE — 97530 THERAPEUTIC ACTIVITIES: CPT | Mod: PN,CQ

## 2024-05-01 NOTE — PROGRESS NOTES
"[facility]   Physical Therapy Treatment Note      Name: Vee Cadena  Clinic Number: [id number]    Therapy Diagnosis:   Encounter Diagnoses   Name Primary?    Decreased strength of lower extremity Yes    Poor posture     Decreased range of motion of lumbar spine     Impaired functional mobility, balance, gait, and endurance        Physician: Denisse Beltrán NP    Visit Date: 5/1/2024    Physician Orders: PT Eval and Treat  Medical Diagnosis from Referral: M54.42,M54.41 (ICD-10-CM) - Low back pain with bilateral sciatica, unspecified back pain laterality, unspecified chronicity   Evaluation Date: 4/19/2024  Authorization Period Expiration: 12/31/2024  Plan of Care Expiration: 8/19/2024  Progress Note Due: 5/19/2024  Date of Surgery: none   Visit # / Visits authorized: 3/ 20   FOTO: 1/ 3     Precautions: Standard and pacemaker, CAD, Heart block, HTN       Time In: 1001  Time Out: 1055  Total Billable Time: 54 minutes      PTA Visit #: 1/5       Subjective     Patient reports: she is not having pain this morning.  She states her pain occurs after she stands for a period of time.    She was compliant with home exercise program.  Response to previous treatment: no complaints  Functional change: first visit after eval    Pain: 0/10  Location:     Objective      Objective Measures updated at progress report unless specified.     Treatment     Vee received the treatments listed below:      therapeutic exercises to develop strength, endurance, ROM, flexibility, posture, and core stabilization for 0 minutes including:      neuromuscular re-education activities to improve: Balance, Coordination, Proprioception, and Posture for 29 minutes. The following activities were included:    Prone on elbows 2' (feels good/no pain/feels "crunching"  Prone press ups 2 x 10 reps     Sidelying Open books x 10 reps each    Seated thoracic extension 2 x 10 reps   Seated Horizontal Abduction 2 x 10 reps red " theraband   Seated Diagonals 2 x 10 reps red theraband   Seated External rotation red theraband 2 x 10 reps     therapeutic activities to improve functional performance for 25 minutes, including:    Bike x 5 minutes, level 2    Standing lumbar extension against mat x 10 reps   Transverse abdominal sets x 10 reps forward and lateral (orange physioball)  Standing shoulder flexion holding small ball 2 x 10 reps (to activate core muscles)    Standing hip extension 2 x 10 reps (leaning over wedge)  Standing hip Abduction 2 x 10 reps       Patient Education and Home Exercises       Education provided:   -apply ice as needed for delayed muscle soreness  -Continue with Home exercise program daily    Written Home Exercises Provided: Patient instructed to cont prior HEP.  Exercises were reviewed and Vee was able to demonstrate them prior to the end of the session.  Vee demonstrated good  understanding of the education provided. See Electronic Medical Record under Patient Instructions for exercises provided during therapy sessions    Assessment     Vee does not report any pain today.  She has been compliant with her Home exercise program. She is doing really well with her exercises.  Occasional rest breaks to catch her breath.    Vee Is progressing well towards her goals.   Patient prognosis is Good.     Patient will continue to benefit from skilled outpatient physical therapy to address the deficits listed in the problem list box on initial evaluation, provide pt/family education and to maximize pt's level of independence in the home and community environment.     Patient's spiritual, cultural and educational needs considered and pt agreeable to plan of care and goals.     Anticipated barriers to physical therapy: age, history of compression fractures     Goals:  STG  Weeks/Visits Date Established  Goal Status    1.Patient will report >/= 30 % improvement in pain and function to improve standing tolerance  3 weeks/  6  visits  4/19/2024        2. Patient will report improved standing tolerance to improve activity tolerance  3 weeks/  6 visits  4/19/2024        3.Patient will report </= 3/10 pain for at worst pain over the past few days to improved functional mobility  3 weeks/  6 visits  4/19/2024        4.Patient will report compliance with lumbar roll to improve sitting posture  3 weeks/  6 visits  4/19/2024        5. Pt will demonstrate and verbalize compliance and independence with HEP to improve therapy outcomes  3 weeks/  6 visits  4/19/2024           LTG Weeks/Visits Date Established  Goal Status    1.Patient will report >/= 50 % improvement in pain and function to improve standing tolerance  6 weeks/ 12 visits  4/19/2024        2. Patient will report </= 2/10 pain for at worst pain over the past few days to improved functional mobility  6 weeks/ 12 visits  4/19/2024           3.Patient will perform 5xSTS without UE support in </= 16 second to improve transfers  6 weeks/ 12 visits  4/19/2024        4.Patient will perform TUG in </= 14 second to decrease risk for falls  6 weeks/ 12 visits  4/19/2024        5.Patient will report >/= 64% on FOTO to improve quality of life  6 weeks/ 12 visits  4/19/2024              Plan      Plan of care Certification: 4/19/2024 to 8/19/2024.     Outpatient Physical Therapy 2 times weekly for 6 weeks to include the following interventions: Gait Training, Manual Therapy, Moist Heat/ Ice, Neuromuscular Re-ed, Patient Education, Therapeutic Activities, and Therapeutic Exercise.       Nikia Johnson, PTA

## 2024-05-08 ENCOUNTER — CLINICAL SUPPORT (OUTPATIENT)
Dept: REHABILITATION | Facility: HOSPITAL | Age: 89
End: 2024-05-08
Payer: MEDICARE

## 2024-05-08 DIAGNOSIS — Z74.09 IMPAIRED FUNCTIONAL MOBILITY, BALANCE, GAIT, AND ENDURANCE: ICD-10-CM

## 2024-05-08 DIAGNOSIS — M53.86 DECREASED RANGE OF MOTION OF LUMBAR SPINE: ICD-10-CM

## 2024-05-08 DIAGNOSIS — R29.3 POOR POSTURE: ICD-10-CM

## 2024-05-08 DIAGNOSIS — R29.898 DECREASED STRENGTH OF LOWER EXTREMITY: Primary | ICD-10-CM

## 2024-05-08 PROCEDURE — 97110 THERAPEUTIC EXERCISES: CPT | Mod: PN,CQ

## 2024-05-08 PROCEDURE — 97112 NEUROMUSCULAR REEDUCATION: CPT | Mod: PN,CQ

## 2024-05-08 NOTE — PROGRESS NOTES
"[facility]   Physical Therapy Treatment Note      Name: Vee Cadena  Clinic Number: [id number]    Therapy Diagnosis:   Encounter Diagnoses   Name Primary?    Decreased strength of lower extremity Yes    Poor posture     Decreased range of motion of lumbar spine     Impaired functional mobility, balance, gait, and endurance        Physician: Denisse Beltrán NP    Visit Date: 5/8/2024    Physician Orders: PT Eval and Treat  Medical Diagnosis from Referral: M54.42,M54.41 (ICD-10-CM) - Low back pain with bilateral sciatica, unspecified back pain laterality, unspecified chronicity   Evaluation Date: 4/19/2024  Authorization Period Expiration: 12/31/2024  Plan of Care Expiration: 8/19/2024  Progress Note Due: 5/19/2024  Date of Surgery: none   Visit # / Visits authorized: 3/ 20   FOTO: 1/ 3     Precautions: Standard and pacemaker, CAD, Heart block, HTN       Time In: 1003  Time Out: 1057  Total Billable Time: 30 minutes (unbillable 24 minutes)      PTA Visit #: 1/5       Subjective     Patient reports: no pain today.  Pt stated she has been doing her exercises at home and she feels she is ready for discharge.  She was compliant with home exercise program.  Response to previous treatment: no complaints  Functional change: first visit after eval    Pain: 0/10  Location:     Objective      Objective Measures updated at progress report unless specified.     Treatment     Vee received the treatments listed below:      therapeutic exercises to develop strength, endurance, ROM, flexibility, posture, and core stabilization for 0 minutes including:      neuromuscular re-education activities to improve: Balance, Coordination, Proprioception, and Posture for 30 minutes. The following activities were included:    Prone on elbows 2' (feels good/no pain/feels "crunching")  Prone press ups 2 x 10 reps   Prone hip extension     Sidelying Open books 2 x 10 reps each    Seated thoracic extension 2 x 10 reps "   Seated Horizontal Abduction 2 x 10 reps red theraband   Seated Diagonals 2 x 10 reps red theraband   Seated External rotation red theraband 2 x 10 reps     therapeutic activities to improve functional performance for 24 minutes, including:    Bike x 5 minutes, level 2    Standing lumbar extension against mat 2 x 10 reps   Transverse abdominal sets x 10 reps forward and lateral (orange physioball)  Standing shoulder flexion holding small ball 2 x 10 reps (to activate core muscles)  Standing hip extension 2 x 10 reps (leaning over wedge)  Standing hip Abduction 2 x 10 reps       Patient Education and Home Exercises       Education provided:   -apply ice as needed for delayed muscle soreness  -Continue with Home exercise program daily    Written Home Exercises Provided: Patient instructed to cont prior HEP.  Exercises were reviewed and Vee was able to demonstrate them prior to the end of the session.  Vee demonstrated good  understanding of the education provided. See Electronic Medical Record under Patient Instructions for exercises provided during therapy sessions    Assessment     Vee reports to PT with no pain.  She is feeling much better and has been compliant with her exercises at home.  She feels she is ready for discharge.      Vee Is progressing well towards her goals.   Patient prognosis is Good.     Patient will continue to benefit from skilled outpatient physical therapy to address the deficits listed in the problem list box on initial evaluation, provide pt/family education and to maximize pt's level of independence in the home and community environment.     Patient's spiritual, cultural and educational needs considered and pt agreeable to plan of care and goals.     Anticipated barriers to physical therapy: age, history of compression fractures     Goals:  STG  Weeks/Visits Date Established  Goal Status    1.Patient will report >/= 30 % improvement in pain and function to improve standing  tolerance  3 weeks/  6 visits  4/19/2024        2. Patient will report improved standing tolerance to improve activity tolerance  3 weeks/  6 visits  4/19/2024        3.Patient will report </= 3/10 pain for at worst pain over the past few days to improved functional mobility  3 weeks/  6 visits  4/19/2024        4.Patient will report compliance with lumbar roll to improve sitting posture  3 weeks/  6 visits  4/19/2024        5. Pt will demonstrate and verbalize compliance and independence with HEP to improve therapy outcomes  3 weeks/  6 visits  4/19/2024           LTG Weeks/Visits Date Established  Goal Status    1.Patient will report >/= 50 % improvement in pain and function to improve standing tolerance  6 weeks/ 12 visits  4/19/2024        2. Patient will report </= 2/10 pain for at worst pain over the past few days to improved functional mobility  6 weeks/ 12 visits  4/19/2024           3.Patient will perform 5xSTS without UE support in </= 16 second to improve transfers  6 weeks/ 12 visits  4/19/2024        4.Patient will perform TUG in </= 14 second to decrease risk for falls  6 weeks/ 12 visits  4/19/2024        5.Patient will report >/= 64% on FOTO to improve quality of life  6 weeks/ 12 visits  4/19/2024              Plan      Plan of care Certification: 4/19/2024 to 8/19/2024.     Outpatient Physical Therapy 2 times weekly for 6 weeks to include the following interventions: Gait Training, Manual Therapy, Moist Heat/ Ice, Neuromuscular Re-ed, Patient Education, Therapeutic Activities, and Therapeutic Exercise.       Nikia Johnson, PTA

## 2024-05-10 ENCOUNTER — CLINICAL SUPPORT (OUTPATIENT)
Dept: REHABILITATION | Facility: HOSPITAL | Age: 89
End: 2024-05-10
Payer: MEDICARE

## 2024-05-10 DIAGNOSIS — R29.3 POOR POSTURE: ICD-10-CM

## 2024-05-10 DIAGNOSIS — M53.86 DECREASED RANGE OF MOTION OF LUMBAR SPINE: ICD-10-CM

## 2024-05-10 DIAGNOSIS — Z74.09 IMPAIRED FUNCTIONAL MOBILITY, BALANCE, GAIT, AND ENDURANCE: ICD-10-CM

## 2024-05-10 DIAGNOSIS — R29.898 DECREASED STRENGTH OF LOWER EXTREMITY: Primary | ICD-10-CM

## 2024-05-10 PROCEDURE — 97112 NEUROMUSCULAR REEDUCATION: CPT | Mod: PN

## 2024-05-10 PROCEDURE — 97110 THERAPEUTIC EXERCISES: CPT | Mod: PN

## 2024-05-10 PROCEDURE — 97530 THERAPEUTIC ACTIVITIES: CPT | Mod: PN

## 2024-05-10 NOTE — PROGRESS NOTES
[facility]   Physical Therapy Treatment Note      Name: Vee Cadena  Clinic Number: [id number]    Therapy Diagnosis:   Encounter Diagnoses   Name Primary?    Decreased strength of lower extremity Yes    Poor posture     Decreased range of motion of lumbar spine     Impaired functional mobility, balance, gait, and endurance        Physician: Denisse Beltrán NP    Visit Date: 5/10/2024    Physician Orders: PT Eval and Treat  Medical Diagnosis from Referral: M54.42,M54.41 (ICD-10-CM) - Low back pain with bilateral sciatica, unspecified back pain laterality, unspecified chronicity   Evaluation Date: 2024  Authorization Period Expiration: 2024  Plan of Care Expiration: 2024  Progress Note Due: 2024  Date of Surgery: none   Visit # / Visits authorized:    FOTO: 3 / 3     Precautions: Standard and pacemaker, CAD, Heart block, HTN       Time In: 1100 AM  Time Out: 1208 AM  Total Billable Time: 68 minutes    PTA Visit #: 0/5     Subjective     Patient reports: she is ready for discharge. She finds the exercise where she is laying on her bell most beneficial. She is able to stand for about 15-20 minutes before she needs to sit. Some days are better than others. Some days she can stand for longer some days not as long.     She was compliant with home exercise program.  Response to previous treatment: no complaints  Functional change improved posture, standing and walking tolerance     Pain: 0/10  Location: bilateral low back     Objective      Objective Measures updated at progress report unless specified.     5xSTS: 11 second compared to 18 seconds     TU second with use of SPC     Treatment     Vee received the treatments listed below:      therapeutic exercises to develop strength, endurance, ROM, flexibility, posture, and core stabilization for 18 minutes including:    Seated LAQ 20 repetitions each side   Standing hip abduction 20 repetitions each LE   Standing  hip extension flexed over mat 20 repetitions each LE    neuromuscular re-education activities to improve: Balance, Coordination, Proprioception, and Posture for 32 minutes. The following activities were included:    Sidelying Open books 2 x 10 reps each  Seated thoracic extension 2 x 10 reps   Sidelying ER clams 20 repetitions   Standing Diagonals 15 reps red theraband   Seated External rotation red theraband 2 x 10 reps     therapeutic activities to improve functional performance for 18 minutes, including:    Testing above   FOTO assessment  Prone on elbows x 2 minutes   Prone press ups 10 repetitions   Nustep x 5 minutes, level 2  Standing lumbar extension against mat 2 x 10 reps   Bridging 10 repetitions, add hip abduction red theraband 10 repetitions     Patient Education and Home Exercises       Education provided:   -apply ice as needed for delayed muscle soreness  -Continue with Home exercise program daily    Written Home Exercises Provided: Patient instructed to cont prior HEP.  Exercises were reviewed and Vee was able to demonstrate them prior to the end of the session.  Vee demonstrated good  understanding of the education provided. See Electronic Medical Record under Patient Instructions for exercises provided during therapy sessions    Assessment     Tx session focused on review of HEP. See discharge note.     Plan      See discharge note.     Марина Ryan, PT

## 2024-05-10 NOTE — PLAN OF CARE
[facility]  5/10/2024 Discharge Note    Name: Vee Cadena  Clinic Number: [id number]    Therapy Diagnosis:   Encounter Diagnoses   Name Primary?    Decreased strength of lower extremity Yes    Poor posture     Decreased range of motion of lumbar spine     Impaired functional mobility, balance, gait, and endurance      Physician: Denisse Beltrán, FAISAL      Physician Orders: PT Eval and Treat  Medical Diagnosis from Referral: M54.42,M54.41 (ICD-10-CM) - Low back pain with bilateral sciatica, unspecified back pain laterality, unspecified chronicity   Evaluation Date: 4/19/2024    Date of Last visit: 5/10/2024  Total Visits Received: 6    ASSESSMENT      Patient is being discharged from skilled Physical Therapist services at this time. She reports she is ready for discharge and is independent and compliant with HEP. She reports 80% improvement since evaluation with score of 76/100 on FOTO which is a 16 point improvement. She demonstrates improvement in posture, back pain, lumbar range of motion, functional LE strength ( 5xSTS score) and decreased risk for falls based on TUG score. She continues to report decreased standing tolerance with limitations at about 20 minutes before she needs to sit. She reports if she pays attention to her posture her tolerance is better. She is appropriate for discharge at this time with HEP.     Discharge reason: Patient has met all of his/her goals, Patient has reached the maximum rehab potential for the present time, and Patient requested discharge    Discharge FOTO Score: 76/100     Goals:  STG  Weeks/Visits Date Established  Goal Status    1.Patient will report >/= 30 % improvement in pain and function to improve standing tolerance  3 weeks/  6 visits  4/19/2024     MET 5/10/2024   2. Patient will report improved standing tolerance to improve activity tolerance  3 weeks/  6 visits  4/19/2024      MET 5/10/2024   3.Patient will report </= 3/10 pain for at worst  pain over the past few days to improved functional mobility  3 weeks/  6 visits  4/19/2024    Not met 5/10/2024   4.Patient will report compliance with lumbar roll to improve sitting posture  3 weeks/  6 visits  4/19/2024     MET 5/10/2024   5. Pt will demonstrate and verbalize compliance and independence with HEP to improve therapy outcomes  3 weeks/  6 visits  4/19/2024     MET 5/10/2024      LTG Weeks/Visits Date Established  Goal Status    1.Patient will report >/= 50 % improvement in pain and function to improve standing tolerance  6 weeks/ 12 visits  4/19/2024     MET 5/10/2024   2. Patient will report </= 2/10 pain for at worst pain over the past few days to improved functional mobility  6 weeks/ 12 visits  4/19/2024       Not met 5/10/2024   3.Patient will perform 5xSTS without UE support in </= 16 second to improve transfers  6 weeks/ 12 visits  4/19/2024    MET 5/10/2024    4.Patient will perform TUG in </= 14 second to decrease risk for falls  6 weeks/ 12 visits  4/19/2024     MET 5/10/2024   5.Patient will report >/= 64% on FOTO to improve quality of life  6 weeks/ 12 visits  4/19/2024     MET 5/10/2024       PLAN   This patient is discharged from Physical Therapy      Марина Ryan, PT

## 2024-08-01 DIAGNOSIS — Z95.0 PACEMAKER: Primary | ICD-10-CM

## 2024-08-06 ENCOUNTER — HOSPITAL ENCOUNTER (OUTPATIENT)
Dept: CARDIOLOGY | Facility: CLINIC | Age: 89
Discharge: HOME | End: 2024-08-06
Attending: INTERNAL MEDICINE
Payer: MEDICARE

## 2024-08-06 DIAGNOSIS — Z95.0 PACEMAKER: ICD-10-CM

## 2024-08-06 LAB
BATTERY VOLTAGE (V): 2.99 V
IMPEDANCE RA LEAD (NATIVE): 475 OHMS
IMPEDANCE RA LEAD: 437 OHMS
THRESHOLD MS RA LEAD (NATIVE): 0.4 MS
THRESHOLD MS RA LEAD: 0.4 MS
THRESHOLD V RA LEAD (NATIVE): 0.6 V
THRESHOLD V RA LEAD: 0.6 V

## 2024-08-06 PROCEDURE — 93288 INTERROG EVL PM/LDLS PM IP: CPT | Mod: 26,S$PBB,, | Performed by: INTERNAL MEDICINE

## 2024-10-29 ENCOUNTER — TELEPHONE (OUTPATIENT)
Dept: FAMILY MEDICINE | Facility: CLINIC | Age: 89
End: 2024-10-29
Payer: MEDICARE

## 2024-10-29 DIAGNOSIS — I10 ESSENTIAL (PRIMARY) HYPERTENSION: ICD-10-CM

## 2024-10-29 RX ORDER — AMLODIPINE BESYLATE 2.5 MG/1
2.5 TABLET ORAL DAILY
Qty: 90 TABLET | Refills: 0 | Status: SHIPPED | OUTPATIENT
Start: 2024-10-29 | End: 2025-10-29

## 2024-10-31 ENCOUNTER — TELEPHONE (OUTPATIENT)
Dept: FAMILY MEDICINE | Facility: CLINIC | Age: 89
End: 2024-10-31
Payer: MEDICARE

## 2024-10-31 ENCOUNTER — PATIENT MESSAGE (OUTPATIENT)
Dept: ADMINISTRATIVE | Facility: OTHER | Age: 89
End: 2024-10-31
Payer: MEDICARE

## 2024-11-21 ENCOUNTER — TELEPHONE (OUTPATIENT)
Dept: FAMILY MEDICINE | Facility: CLINIC | Age: 89
End: 2024-11-21
Payer: MEDICARE

## 2024-11-21 DIAGNOSIS — I10 ESSENTIAL HYPERTENSION: ICD-10-CM

## 2024-11-21 RX ORDER — LOSARTAN POTASSIUM 50 MG/1
50 TABLET ORAL 2 TIMES DAILY
Qty: 180 TABLET | Refills: 0 | Status: SHIPPED | OUTPATIENT
Start: 2024-11-21 | End: 2025-11-21

## 2024-11-21 NOTE — TELEPHONE ENCOUNTER
----- Message from Noreen sent at 11/21/2024 10:42 AM CST -----  Type:  RX Refill Request    Who Called: Pt   Refill or New Rx: Refill   RX Name and Strength: losartan (COZAAR) 50 MG tablet  Preferred Pharmacy with phone number:  [id number] [place] [address] [place] [id number]  Phone: [phone] Fax: [phone]  Local or Mail Order: Local   Ordering Provider: Jerel   Would the patient rather a call back or a response via [facility]? Call back   Best Call Back Number: [phone]  Additional Information: Please be advised, pt states that she would like a call back once refill has been put in

## 2024-11-21 NOTE — TELEPHONE ENCOUNTER
----- Message from Noreen sent at 11/21/2024 10:42 AM CST -----  Type:  RX Refill Request    Who Called: Pt   Refill or New Rx: Refill   RX Name and Strength: losartan (COZAAR) 50 MG tablet  Preferred Pharmacy with phone number:  [id number] - [place] - [address] [place] [address]  Phone: [phone] Fax: [phone]  Local or Mail Order: Local   Ordering Provider: Jerel   Would the patient rather a call back or a response via [facility]? Call back   Best Call Back Number: [phone]  Additional Information: Please be advised, pt states that she would like a call back once refill has been put in

## 2024-12-07 ENCOUNTER — HOSPITAL ENCOUNTER (EMERGENCY)
Facility: HOSPITAL | Age: 89
Discharge: HOME | End: 2024-12-07
Attending: EMERGENCY MEDICINE
Payer: MEDICARE

## 2024-12-07 VITALS
BODY MASS INDEX: 23.56 KG/M2 | RESPIRATION RATE: 18 BRPM | SYSTOLIC BLOOD PRESSURE: 177 MMHG | WEIGHT: 120 LBS | TEMPERATURE: 98 F | OXYGEN SATURATION: 93 % | DIASTOLIC BLOOD PRESSURE: 70 MMHG | HEIGHT: 60 IN | HEART RATE: 70 BPM

## 2024-12-07 DIAGNOSIS — M54.9 BACK PAIN: ICD-10-CM

## 2024-12-07 DIAGNOSIS — S20.221A CONTUSION OF RIGHT SIDE OF BACK, INITIAL ENCOUNTER: Primary | ICD-10-CM

## 2024-12-07 DIAGNOSIS — M25.559 HIP PAIN: ICD-10-CM

## 2024-12-07 LAB
HCV AB SERPL QL IA: NEGATIVE
HIV 1+2 AB+HIV1 P24 AG SERPL QL IA: NEGATIVE
OHS QRS DURATION: 146 MS
OHS QTC CALCULATION: 482 MS

## 2024-12-07 PROCEDURE — 25000003 PHARM REV CODE 250: Performed by: EMERGENCY MEDICINE

## 2024-12-07 PROCEDURE — 99285 EMERGENCY DEPT VISIT HI MDM: CPT | Mod: 25

## 2024-12-07 PROCEDURE — 86803 HEPATITIS C AB TEST: CPT | Performed by: EMERGENCY MEDICINE

## 2024-12-07 PROCEDURE — 87389 HIV-1 AG W/HIV-1&-2 AB AG IA: CPT | Performed by: EMERGENCY MEDICINE

## 2024-12-07 RX ORDER — LOSARTAN POTASSIUM 25 MG/1
50 TABLET ORAL DAILY
Status: DISCONTINUED | OUTPATIENT
Start: 2024-12-07 | End: 2024-12-07

## 2024-12-07 RX ORDER — OXYCODONE AND ACETAMINOPHEN 5; 325 MG/1; MG/1
1 TABLET ORAL
Status: COMPLETED | OUTPATIENT
Start: 2024-12-07 | End: 2024-12-07

## 2024-12-07 RX ORDER — AMLODIPINE BESYLATE 2.5 MG/1
2.5 TABLET ORAL
Status: COMPLETED | OUTPATIENT
Start: 2024-12-07 | End: 2024-12-07

## 2024-12-07 RX ORDER — LOSARTAN POTASSIUM 25 MG/1
50 TABLET ORAL ONCE
Status: COMPLETED | OUTPATIENT
Start: 2024-12-07 | End: 2024-12-07

## 2024-12-07 RX ORDER — OXYCODONE AND ACETAMINOPHEN 5; 325 MG/1; MG/1
1 TABLET ORAL EVERY 6 HOURS PRN
Qty: 12 TABLET | Refills: 0 | Status: SHIPPED | OUTPATIENT
Start: 2024-12-07

## 2024-12-07 RX ADMIN — OXYCODONE HYDROCHLORIDE AND ACETAMINOPHEN 1 TABLET: 5; 325 TABLET ORAL at 09:12

## 2024-12-07 RX ADMIN — AMLODIPINE BESYLATE 2.5 MG: 2.5 TABLET ORAL at 07:12

## 2024-12-07 RX ADMIN — LOSARTAN POTASSIUM 50 MG: 25 TABLET, FILM COATED ORAL at 07:12

## 2024-12-07 NOTE — ED PROVIDER NOTES
Encounter Date: 12/7/2024       History     Chief Complaint   Patient presents with    Fall     Pt fell on Tuesday. Landed on right side. Currently complaining of right hip, flank, and back pain. Received 50mg of Fentanyl by EMS     Patient was attempting to sit on the toilet.  She slipped on a rug.  The top of the toilet lid hit her in the back.  She has been dealing with the pain.  Woke up this morning with more pain.  Called EMS.  She received fentanyl 50 mcg EN route.  There was no head injury.  No loss of consciousness or syncope.  No seizure-like activity.  Patient denies any leg weakness.      Review of patient's allergies indicates:   Allergen Reactions    Demerol [meperidine]      restless    Ciprofloxacin Nausea Only and Other (See Comments)     Severe dizziness      Past Medical History:   Diagnosis Date    Back ache     Back pain     Carotid bruit     Cervical disc disease     Chest pain     Coronary artery disease     Diffuse cystic mastopathy     Elevated blood pressure     Encounter for blood transfusion     Foot pain, left     Generalized osteoarthrosis     Herpes zoster without complications     Hypertension     Neck pain     Neuropathy     Osteoporosis     Palpitations     PONV (postoperative nausea and vomiting)     TIA (transient ischemic attack)      Past Surgical History:   Procedure Laterality Date    APPENDECTOMY      bladder and colon lift and retached      CARDIAC CATHETERIZATION      CATARACT EXTRACTION  02/23/2009    CHOLECYSTECTOMY      COLONOSCOPY Left 9/6/2021    Procedure: COLONOSCOPY;  Surgeon: Erlin Lainez MD;  Location: [facility];  Service: Endoscopy;  Laterality: Left;    ESOPHAGOGASTRODUODENOSCOPY Left 9/6/2021    Procedure: EGD (ESOPHAGOGASTRODUODENOSCOPY);  Surgeon: Erlin Lainez MD;  Location: [facility];  Service: Endoscopy;  Laterality: Left;    INSERTION OF PACEMAKER  12/2019    pt reported    TOTAL ABDOMINAL HYSTERECTOMY      TUBAL LIGATION       Family  History   Problem Relation Name Age of Onset    Heart disease Father      Thyroid disease Father      Diabetes Father      Stroke Father      Diabetes Brother      Hypertension Brother      Hyperlipidemia Brother      Heart disease Brother      Cancer Brother          lung    Hypertension Sister      Diabetes Sister      Heart disease Sister      Hyperlipidemia Sister      Dementia Mother      Down syndrome Daughter      COPD Daughter       Social History     Tobacco Use    Smoking status: Never    Smokeless tobacco: Never   Substance Use Topics    Alcohol use: Not Currently     Comment: socially if ever     Review of Systems   Constitutional:  Negative for chills and fever.   HENT:  Negative for congestion.    Eyes:  Negative for visual disturbance.   Respiratory:  Negative for shortness of breath.    Cardiovascular:  Negative for chest pain and palpitations.   Gastrointestinal:  Negative for abdominal pain and vomiting.   Genitourinary:  Negative for dysuria.   Musculoskeletal:  Negative for joint swelling.   Neurological:  Negative for headaches.   Psychiatric/Behavioral:  Negative for confusion.        Physical Exam     Initial Vitals [12/07/24 0607]   BP Pulse Resp Temp SpO2   (!) 202/92 81 16 97.7 °F (36.5 °C) (!) 91 %      MAP       --         Physical Exam    Nursing note and vitals reviewed.  Constitutional: She is not diaphoretic. No distress.   HENT:   Head: Normocephalic and atraumatic.   Eyes: Conjunctivae and EOM are normal.   Neck:   Normal range of motion.  Cardiovascular:  Normal rate.           Pulmonary/Chest: Breath sounds normal.   Abdominal: Abdomen is soft. There is no abdominal tenderness.   Musculoskeletal:         General: Normal range of motion.      Cervical back: Normal range of motion.      Comments: Good movement both legs.  There is some pain with right hip flexion extension.  All extremities are neurovascular intact.  Capillary refill less than 2 seconds.  Sensation intact.  There is  tenderness over right L5/S1 musculature.  No significant midline tenderness.  No ecchymoses.     Neurological: She is alert.   No gross deficits   Skin: No rash noted.   Psychiatric: She has a normal mood and affect.         ED Course   Procedures  Labs Reviewed   HEPATITIS C ANTIBODY   HIV 1 / 2 ANTIBODY          Imaging Results              CT Pelvis Without Contrast (Final result)  Result time 12/07/24 08:03:31      Final result by Constantine Fortune Jr., MD (12/07/24 08:03:31)                   Impression:      1.  No CT evidence of acute traumatic injury in the pelvis.    2.  Minimal levoscoliosis of the lumbar column with chronic degenerative spondylosis changes particularly targeted towards the L4-5 and L5-S1 intervertebral disc levels.      Electronically signed by: Constantine Fortune MD  Date:    12/07/2024  Time:    08:03               Narrative:    EXAMINATION:  CT PELVIS WITHOUT CONTRAST    CLINICAL HISTORY:  Pelvic trauma;    TECHNIQUE:  Routine cross-section evaluation the pelvis was completed utilizing a multidetector scanner with supplemental coronal and sagittal reconstruction images also included along with standard dataset provided.  Images were reviewed in both soft tissue and bone algorithm.  Documented DLP is 461.7.  CT DI 16.0.    COMPARISON:  No previous comparison studies are made available.    FINDINGS:  Uppermost field-of-view displaced the presence of fairly mild levoconvex alignment of lumbar spine, chronic degenerative osteophytic spurring propagating from the medial edge of the levo contour alignment with narrowing of the L4/L5 and L5/S1 intervertebral disc levels, vacuum gas phenomena encompassing the L5/S1 intervertebral disc.    Bony pelvic ring appears intact without evidence of fracture, diastasis, or significant cortical disruption.    Both femoral heads are well seated within the acetabular compartment with mild chronic degenerative narrowing of the bilateral hip articular  cartilage space along the apex.    Pelvic contents appear within normal limits, although there is marked distention involving urinary bladder, diverticular changes throughout the sigmoid portion the colon, and minimal fluid projecting within the region of the cul-de-sac.  There are several benign spherical calcifications in the pelvic cavity there on the basis of benign phleboliths.  There is a small inguinal hernia projecting through the left femoral in canal.                                       CT Lumbar Spine Without Contrast (Final result)  Result time 12/07/24 07:58:27      Final result by Constantine Fortune Jr., MD (12/07/24 07:58:27)                   Impression:      1.  Old osteoporotic compression fracture of the T12 vertebral body without evidence of retropulsion.    2.  Exaggerated hyperlordosis of the lumbar column with chronic degenerative spondylosis changes and disc osteophytic spurring propagating from several levels most pronounced at the L1/L2 and L2/L3 intervertebral disc levels.    3.  No evidence of acute osseous or soft tissue abnormality.      Electronically signed by: Constantine Fortune MD  Date:    12/07/2024  Time:    07:58               Narrative:    EXAMINATION:  CT LUMBAR SPINE WITHOUT CONTRAST    CLINICAL HISTORY:  Low back pain, trauma;    TECHNIQUE:  Low-dose axial, sagittal and coronal reformations are obtained through the lumbar spine.  Contrast was not administered.    COMPARISON:  Correlation is made with the patient's previous lumbar spine radiographs dated 12/07/2024.    FINDINGS:  Visualized portions of the lung bases demonstrates evidence of geographic areas of increased density there are identified in the basilar segments bilaterally on the basis of atelectasis with fairly prominent diffuse interstitial pulmonary scar formation that is observed within the included images of lower chest.  Dual lead pacemaker device projects over the cardiac shadow within the right internal and  right atrium.  Thoracic aorta appears normal in caliber with evidence of extensive atheromatous calcifications.    Exaggerated hyperlordosis of the lumbar spine with generalized skeletal osteopenia.  Old osteoporotic compression fracture is established at the level of T12 with primary anterior column involvement.  Mild posterior positioning of L2 upon L3 and L3 upon L4 is noted with mild anterior positioning of L4 upon L5.    Mildly prominent disc osteophytic spurring propagating from the dorsal edge of the the L2/L3 and L3/L4 intervertebral disc level with minimal encroaching upon the thecal sac.    Vacuum gas phenomena at the L5-S1 levels noted.                                       X-Ray Hip 2 or 3 views Right with Pelvis when performed (Final result)  Result time 12/07/24 07:04:09      Final result by Constantine Fortune Jr., MD (12/07/24 07:04:09)                   Impression:      Unremarkable hip radiographs.      Electronically signed by: Constantine Fortune MD  Date:    12/07/2024  Time:    07:04               Narrative:    EXAMINATION:  XR HIP WITH PELVIS WHEN PERFORMED 2 OR 3 VIEWS RIGHT    CLINICAL HISTORY:  Pain in unspecified hip    TECHNIQUE:  AP view of the pelvis and frogleg lateral views of both hips were performed.    COMPARISON:  None.    FINDINGS:  There is no fracture or dislocation.    The femoral head contours are preserved.  The joint spaces are maintained.  There is appropriate acetabular over coverage.    The soft tissues are unremarkable.                                       X-Ray Lumbar Spine 5 View (Final result)  Result time 12/07/24 07:03:50   Procedure changed from X-Ray Lumbar Spine Ap And Lateral     Final result by Constantine Fortune Jr., MD (12/07/24 07:03:50)                   Impression:      Dextroscoliosis of the lumbar column with chronic degenerative spondylosis changes.  No evidence of acute radiographic abnormality.  Posterior positioning of L2 upon L3 and L3 upon L4 noted as  well as old osteoporotic compression fracture at T12.      Electronically signed by: Constantine Fortune MD  Date:    12/07/2024  Time:    07:03               Narrative:    EXAMINATION:  XR LUMBAR SPINE COMPLETE 5 VIEW    CLINICAL HISTORY:  Back pain;.    TECHNIQUE:  AP, lateral, bilateral oblique, and cone-down views of lumbosacral junction.    COMPARISON:  11/30/2009.    FINDINGS:  Dual lead pacemaker device projects over the upper heart with the distal leads projected over the right atrium and right ventricle.    Mild dextroscoliosis of the lumbar column with chronic degenerative spondylosis changes of the entirety of the lumbar spine.  Overall bony construct appears osteopenic.  Mild dorsal subluxation of L2 over L3 and L3 upon L4.  Minimal ventral subluxation of L5 upon S1.  Bilateral lower lumbar spine facet arthrosis.  Old osteoporotic compression fracture established at the level of T12.  Extensive atheromatous calcifications of the abdominal aorta.                                       Medications   oxyCODONE-acetaminophen 5-325 mg per tablet 1 tablet (has no administration in time range)   amLODIPine tablet 2.5 mg (2.5 mg Oral Given 12/7/24 0755)   losartan tablet 50 mg (50 mg Oral Given 12/7/24 0754)     Medical Decision Making  Patient presents with isolated back injury.  No other complaints.  X-ray CT of L-spine and pelvis with no acute fractures.  Multiple chronic findings.  Patient states she is completely pain-free when she is laying still.  Intense pain with any movement.  Family at bedside will take her to their house with assistance.  They will watch her closely.  There is no evidence of neurologic/radicular pain.  Strict instructions for opioid management including fall precautions in constipation.    Amount and/or Complexity of Data Reviewed  Radiology: ordered. Decision-making details documented in ED Course.    Risk  Prescription drug management.                                      Clinical  Impression:  Final diagnoses:  [M25.559] Hip pain  [S20.221A] Contusion of right side of back, initial encounter (Primary)          ED Disposition Condition    Discharge Stable          ED Prescriptions       Medication Sig Dispense Start Date End Date Auth. Provider    oxyCODONE-acetaminophen (PERCOCET) 5-325 mg per tablet Take 1 tablet by mouth every 6 (six) hours as needed for Pain. 12 tablet 12/7/2024 -- Ra Pfeiffer MD          Follow-up Information       Follow up With Specialties Details Why Contact Info Additional Information    [facility] - Emergency Dept Emergency Medicine  If symptoms worsen [address] [address]  [phone] 1st floor    Denisse Beltrán NP Family Medicine In 1 week  [address]  [address]  [place] [facility] [place] [address]  [phone]                Ra Pfeiffer MD  12/07/24 0897

## 2024-12-08 LAB
OHS QRS DURATION: 146 MS
OHS QTC CALCULATION: 482 MS